# Patient Record
Sex: FEMALE | Race: WHITE | Employment: FULL TIME | URBAN - METROPOLITAN AREA
[De-identification: names, ages, dates, MRNs, and addresses within clinical notes are randomized per-mention and may not be internally consistent; named-entity substitution may affect disease eponyms.]

---

## 2017-02-27 PROBLEM — E66.9 OBESITY: Status: ACTIVE | Noted: 2017-02-27

## 2019-03-22 DIAGNOSIS — I10 ESSENTIAL HYPERTENSION: ICD-10-CM

## 2019-03-22 RX ORDER — NEBIVOLOL 5 MG/1
5 TABLET ORAL DAILY
Qty: 90 TABLET | Refills: 0 | Status: SHIPPED | OUTPATIENT
Start: 2019-03-22 | End: 2019-06-12 | Stop reason: ALTCHOICE

## 2019-05-15 ENCOUNTER — OFFICE VISIT (OUTPATIENT)
Dept: PRIMARY CARE CLINIC | Age: 44
End: 2019-05-15
Payer: COMMERCIAL

## 2019-05-15 ENCOUNTER — HOSPITAL ENCOUNTER (OUTPATIENT)
Age: 44
Setting detail: SPECIMEN
Discharge: HOME | End: 2019-05-15
Payer: COMMERCIAL

## 2019-05-15 VITALS
BODY MASS INDEX: 33.03 KG/M2 | OXYGEN SATURATION: 98 % | HEART RATE: 78 BPM | DIASTOLIC BLOOD PRESSURE: 92 MMHG | WEIGHT: 192.4 LBS | SYSTOLIC BLOOD PRESSURE: 130 MMHG

## 2019-05-15 DIAGNOSIS — R42 DIZZINESS: ICD-10-CM

## 2019-05-15 DIAGNOSIS — R29.898 ARM HEAVINESS: ICD-10-CM

## 2019-05-15 DIAGNOSIS — R07.89 OTHER CHEST PAIN: ICD-10-CM

## 2019-05-15 DIAGNOSIS — R07.89 OTHER CHEST PAIN: Primary | ICD-10-CM

## 2019-05-15 DIAGNOSIS — G51.4 FACIAL TWITCHING: ICD-10-CM

## 2019-05-15 DIAGNOSIS — G44.52 NEW DAILY PERSISTENT HEADACHE: ICD-10-CM

## 2019-05-15 DIAGNOSIS — E78.5 DYSLIPIDEMIA: ICD-10-CM

## 2019-05-15 LAB
ABSOLUTE EOS #: 0.04 K/UL (ref 0–0.44)
ABSOLUTE IMMATURE GRANULOCYTE: <0.03 K/UL (ref 0–0.3)
ABSOLUTE LYMPH #: 2.01 K/UL (ref 1.1–3.7)
ABSOLUTE MONO #: 0.45 K/UL (ref 0.1–1.2)
ANION GAP SERPL CALCULATED.3IONS-SCNC: 14 MMOL/L (ref 9–17)
BASOPHILS # BLD: 0 % (ref 0–2)
BASOPHILS ABSOLUTE: 0.03 K/UL (ref 0–0.2)
BUN BLDV-MCNC: 13 MG/DL (ref 6–20)
BUN/CREAT BLD: NORMAL (ref 9–20)
CALCIUM SERPL-MCNC: 9 MG/DL (ref 8.6–10.4)
CHLORIDE BLD-SCNC: 103 MMOL/L (ref 98–107)
CHOLESTEROL/HDL RATIO: 4.9
CHOLESTEROL: 225 MG/DL
CO2: 23 MMOL/L (ref 20–31)
CREAT SERPL-MCNC: 0.64 MG/DL (ref 0.5–0.9)
DIFFERENTIAL TYPE: ABNORMAL
EOSINOPHILS RELATIVE PERCENT: 1 % (ref 1–4)
GFR AFRICAN AMERICAN: >60 ML/MIN
GFR NON-AFRICAN AMERICAN: >60 ML/MIN
GFR SERPL CREATININE-BSD FRML MDRD: NORMAL ML/MIN/{1.73_M2}
GFR SERPL CREATININE-BSD FRML MDRD: NORMAL ML/MIN/{1.73_M2}
GLUCOSE FASTING: 91 MG/DL (ref 70–99)
HCT VFR BLD CALC: 46.2 % (ref 36.3–47.1)
HDLC SERPL-MCNC: 46 MG/DL
HEMOGLOBIN: 14.7 G/DL (ref 11.9–15.1)
IMMATURE GRANULOCYTES: 0 %
LDL CHOLESTEROL: 154 MG/DL (ref 0–130)
LYMPHOCYTES # BLD: 27 % (ref 24–43)
MCH RBC QN AUTO: 28.1 PG (ref 25.2–33.5)
MCHC RBC AUTO-ENTMCNC: 31.8 G/DL (ref 28.4–34.8)
MCV RBC AUTO: 88.2 FL (ref 82.6–102.9)
MONOCYTES # BLD: 6 % (ref 3–12)
NRBC AUTOMATED: 0 PER 100 WBC
PDW BLD-RTO: 12.8 % (ref 11.8–14.4)
PLATELET # BLD: 250 K/UL (ref 138–453)
PLATELET ESTIMATE: ABNORMAL
PMV BLD AUTO: 10.4 FL (ref 8.1–13.5)
POTASSIUM SERPL-SCNC: 5.1 MMOL/L (ref 3.7–5.3)
RBC # BLD: 5.24 M/UL (ref 3.95–5.11)
RBC # BLD: ABNORMAL 10*6/UL
SEG NEUTROPHILS: 66 % (ref 36–65)
SEGMENTED NEUTROPHILS ABSOLUTE COUNT: 4.95 K/UL (ref 1.5–8.1)
SODIUM BLD-SCNC: 140 MMOL/L (ref 135–144)
TRIGL SERPL-MCNC: 125 MG/DL
VLDLC SERPL CALC-MCNC: ABNORMAL MG/DL (ref 1–30)
WBC # BLD: 7.5 K/UL (ref 3.5–11.3)
WBC # BLD: ABNORMAL 10*3/UL

## 2019-05-15 PROCEDURE — 93000 ELECTROCARDIOGRAM COMPLETE: CPT | Performed by: PHYSICIAN ASSISTANT

## 2019-05-15 PROCEDURE — 99214 OFFICE O/P EST MOD 30 MIN: CPT | Performed by: PHYSICIAN ASSISTANT

## 2019-05-15 PROCEDURE — 96372 THER/PROPH/DIAG INJ SC/IM: CPT | Performed by: PHYSICIAN ASSISTANT

## 2019-05-15 RX ORDER — KETOROLAC TROMETHAMINE 30 MG/ML
60 INJECTION, SOLUTION INTRAMUSCULAR; INTRAVENOUS ONCE
Status: COMPLETED | OUTPATIENT
Start: 2019-05-15 | End: 2019-05-15

## 2019-05-15 RX ADMIN — KETOROLAC TROMETHAMINE 60 MG: 30 INJECTION, SOLUTION INTRAMUSCULAR; INTRAVENOUS at 11:06

## 2019-05-15 ASSESSMENT — ENCOUNTER SYMPTOMS
NAUSEA: 1
PHOTOPHOBIA: 0
VOMITING: 0
SHORTNESS OF BREATH: 0

## 2019-05-15 NOTE — PROGRESS NOTES
[facility] Primary Care  [address] [facility]  Phone: [phone]  Fax: [phone]    Olivia Urena is a 40 y.o. female who presents today for her medical conditions/complaintsas noted below. Chief Complaint   Patient presents with    Headache     x 2 weeks. constant, still there. pain fluctuates. otc tylenol, ibuprofen, aleve, excedrin, elena. nothing really works. elena eases the pain a little. pain goes from 7-8 to a 2-3. pain is mainly in temples.  Chest Pain     happened on Friday, 1 time. Was having dinner when it happened. Lasted 1 minute. Left side of chest. Did not feel like heartburn. Foot went numb the day before, left foot. Left arm felt extremely heavy on Sunday.  Hypertension     Has been running high. 160s/90s.  Spasms     face was twitching a few weeks ago. 50+ times a day under right eye. not happens in her eyelid. no pain, no vision change. HPI:     HPI  End of March started with face twitches, no pain. Then started 50 times a day with twitches. Now in right lower eye lid only. Has constant HA for 2 weeks now that nothing relieves. Elena ASA helps a little    Stressed with brother in ETOH detox--home now. Son found vaping one time at school and she is really upset about this. All above happened before this stress      Left foot become numb and red when walking once. After a game at dinner she had a pain in left chest lasting one minute with some SOB. That weekend (this past Friday) her left arm became heavy for 2 hours. BPs at home have been 130-50s/80-90s. Current Outpatient Medications   Medication Sig Dispense Refill    nebivolol (BYSTOLIC) 5 MG tablet Take 1 tablet by mouth daily 90 tablet 0    Multiple Vitamin (MULTI-DAY) TABS Take 1 tablet by mouth daily       No current facility-administered medications for this visit.       No Known Allergies    Subjective:      Review of Systems   Constitutional: Negative Specific Question:   Is Patient Fasting?/# of Hours     Answer:   10-12 hours    EKG 12 Lead     Order Specific Question:   Reason for Exam?     Answer:   Chest pain     Orders Placed This Encounter   Medications    ketorolac (TORADOL) injection 60 mg           Electronically signed by Padmini Lopez 5/15/2019 at 12:46 PM

## 2019-06-12 ENCOUNTER — OFFICE VISIT (OUTPATIENT)
Dept: PRIMARY CARE CLINIC | Age: 44
End: 2019-06-12
Payer: COMMERCIAL

## 2019-06-12 VITALS
DIASTOLIC BLOOD PRESSURE: 98 MMHG | OXYGEN SATURATION: 98 % | WEIGHT: 196.8 LBS | SYSTOLIC BLOOD PRESSURE: 144 MMHG | HEART RATE: 71 BPM | BODY MASS INDEX: 33.78 KG/M2

## 2019-06-12 DIAGNOSIS — G44.89 OTHER HEADACHE SYNDROME: ICD-10-CM

## 2019-06-12 DIAGNOSIS — I10 ESSENTIAL HYPERTENSION: Primary | ICD-10-CM

## 2019-06-12 PROCEDURE — 99214 OFFICE O/P EST MOD 30 MIN: CPT | Performed by: PHYSICIAN ASSISTANT

## 2019-06-12 RX ORDER — LISINOPRIL 10 MG/1
10 TABLET ORAL DAILY
Qty: 30 TABLET | Refills: 5 | Status: SHIPPED | OUTPATIENT
Start: 2019-06-12 | End: 2019-12-17 | Stop reason: SDUPTHER

## 2019-06-12 RX ORDER — CLINDAMYCIN HYDROCHLORIDE 300 MG/1
CAPSULE ORAL
Refills: 0 | COMMUNITY
Start: 2019-05-31 | End: 2019-07-09 | Stop reason: ALTCHOICE

## 2019-06-12 ASSESSMENT — ENCOUNTER SYMPTOMS: RESPIRATORY NEGATIVE: 1

## 2019-06-12 NOTE — PROGRESS NOTES
[address] [place] [address] PRIMARY CARE  [address] [place] [address]  Dept: [phone]    Kirstin Arevalo is a 40 y.o. female who presents today for her medical conditions/complaintsas noted below. Chief Complaint   Patient presents with    Results     MRI and BW from 5/15/19.  Forms     health screening form for work. HPI:     HPI  HAs are better. Did have root canal emergently with bad infection. No longer having facial twitching. On ibuprofen for tooth pain round the clock. Did take Amoxil and was switched to Clindamycin. Will see dentist  . Bite has been adjusted several times. Fioricet did not help. Also has a work wellness form.        LDL Cholesterol (mg/dL)   Date Value   05/15/2019 154 (H)     LDL Calculated (mg/dL)   Date Value   2018 123   2017 180 (A)       (goal LDL is <100)   BUN (mg/dL)   Date Value   05/15/2019 13     BP Readings from Last 3 Encounters:   19 (!) 144/98   05/15/19 (!) 130/92   18 124/76          (goal 120/80)    Past Medical History:   Diagnosis Date    Herpes labialis     Uterine leiomyoma       Past Surgical History:   Procedure Laterality Date     SECTION      HYSTERECTOMY  2018    With Removal of Submucous Leiomyomata    LAPAROSCOPY      Excis Endometriotic Tissue Uterosacral Ligaments       Family History   Problem Relation Age of Onset    High Blood Pressure Mother     Depression Mother     Other Mother         Cluster Headaches; Fibromyalgia    Cancer Maternal Grandmother         breast    Heart Disease Maternal Grandfather     Cancer Paternal Grandmother         breast    Stroke Paternal Grandmother     Arthritis Paternal Grandmother     Cancer Paternal Grandfather         lung    Other Father         Alcoholism    Other Sister         Ovarian Neoplasm       Social History     Tobacco Use    Smoking status: Never Smoker    Smokeless tobacco: Never Used Substance Use Topics    Alcohol use: Yes     Alcohol/week: 0.0 oz      Current Outpatient Medications   Medication Sig Dispense Refill    lisinopril (PRINIVIL;ZESTRIL) 10 MG tablet Take 1 tablet by mouth daily 30 tablet 5    clindamycin (CLEOCIN) 300 MG capsule TAKE 1 CAPSULE BY MOUTH EVERY EIGHT HOURS UNTIL GONE  0    Multiple Vitamin (MULTI-DAY) TABS Take 1 tablet by mouth daily       No current facility-administered medications for this visit. No Known Allergies    Health Maintenance   Topic Date Due    HIV screen  01/31/1990    Cervical cancer screen  01/31/1996    Potassium monitoring  05/15/2020    Creatinine monitoring  05/15/2020    Lipid screen  05/15/2024    DTaP/Tdap/Td vaccine (2 - Td) 11/19/2028    Flu vaccine  Completed    Pneumococcal 0-64 years Vaccine  Aged Out       Subjective:      Review of Systems   Constitutional: Positive for fatigue. Negative for diaphoresis. HENT: Positive for dental problem. Respiratory: Negative. Cardiovascular: Negative. Musculoskeletal: Negative for neck pain and neck stiffness. Neurological: Positive for headaches (tightness in temples). Negative for dizziness, syncope, light-headedness and numbness. Objective:     BP (!) 144/98   Pulse 71   Wt 196 lb 12.8 oz (89.3 kg)   SpO2 98%   BMI 33.78 kg/m²   Physical Exam   Constitutional: She appears well-developed and well-nourished. No distress. HENT:   Mouth/Throat: Oropharynx is clear and moist and mucous membranes are normal. Abnormal dentition (molar on right). No dental abscesses. Tonsils are 0 on the right. Tonsils are 0 on the left. Eyes: Pupils are equal, round, and reactive to light. Conjunctivae are normal.   Cardiovascular: Normal rate, regular rhythm and normal heart sounds.    Pulmonary/Chest: Effort normal and breath sounds normal.   Lymphadenopathy:        Head (right side): No submental, no submandibular, no tonsillar, no preauricular, no posterior auricular and no

## 2019-06-13 DIAGNOSIS — R29.898 ARM HEAVINESS: ICD-10-CM

## 2019-06-13 DIAGNOSIS — G44.52 NEW DAILY PERSISTENT HEADACHE: ICD-10-CM

## 2019-06-13 DIAGNOSIS — R42 DIZZINESS: ICD-10-CM

## 2019-06-13 DIAGNOSIS — G51.4 FACIAL TWITCHING: ICD-10-CM

## 2019-07-09 ENCOUNTER — OFFICE VISIT (OUTPATIENT)
Dept: PRIMARY CARE CLINIC | Age: 44
End: 2019-07-09
Payer: COMMERCIAL

## 2019-07-09 VITALS
DIASTOLIC BLOOD PRESSURE: 88 MMHG | BODY MASS INDEX: 33.47 KG/M2 | HEART RATE: 85 BPM | WEIGHT: 195 LBS | SYSTOLIC BLOOD PRESSURE: 118 MMHG | OXYGEN SATURATION: 98 %

## 2019-07-09 DIAGNOSIS — G44.209 TENSION HEADACHE: ICD-10-CM

## 2019-07-09 DIAGNOSIS — G43.009 MIGRAINE WITHOUT AURA AND WITHOUT STATUS MIGRAINOSUS, NOT INTRACTABLE: Primary | ICD-10-CM

## 2019-07-09 DIAGNOSIS — G44.52 NEW DAILY PERSISTENT HEADACHE: ICD-10-CM

## 2019-07-09 DIAGNOSIS — I10 ESSENTIAL HYPERTENSION: ICD-10-CM

## 2019-07-09 PROCEDURE — 99213 OFFICE O/P EST LOW 20 MIN: CPT | Performed by: PHYSICIAN ASSISTANT

## 2019-07-09 RX ORDER — FROVATRIPTAN SUCCINATE 2.5 MG/1
2.5 TABLET, FILM COATED ORAL PRN
Qty: 10 TABLET | Refills: 2 | Status: SHIPPED | OUTPATIENT
Start: 2019-07-09 | End: 2019-07-09 | Stop reason: ALTCHOICE

## 2019-07-09 RX ORDER — TOPIRAMATE 25 MG/1
TABLET ORAL
Qty: 148 TABLET | Refills: 0 | Status: SHIPPED | OUTPATIENT
Start: 2019-07-09 | End: 2020-12-14

## 2019-07-09 RX ORDER — NARATRIPTAN 2.5 MG/1
2.5 TABLET ORAL
Qty: 9 TABLET | Refills: 0 | Status: SHIPPED | OUTPATIENT
Start: 2019-07-09 | End: 2020-12-14 | Stop reason: ALTCHOICE

## 2019-07-09 ASSESSMENT — ENCOUNTER SYMPTOMS
SINUS PRESSURE: 0
RESPIRATORY NEGATIVE: 1
PHOTOPHOBIA: 0
EYE PAIN: 0
RHINORRHEA: 0

## 2019-07-09 NOTE — PROGRESS NOTES
Paternal Grandmother     Arthritis Paternal Grandmother     Cancer Paternal Grandfather         lung    Other Father         Alcoholism    Other Sister         Ovarian Neoplasm       Social History     Tobacco Use    Smoking status: Never Smoker    Smokeless tobacco: Never Used   Substance Use Topics    Alcohol use: Yes     Alcohol/week: 0.0 oz      Current Outpatient Medications   Medication Sig Dispense Refill    topiramate (TOPAMAX) 25 MG tablet Take 1 tablet qd x4 days, then 2 tablets bid x 4days, then 2 tablets qam and 1 tablet qhs x4days, then 2 tablets bid 148 tablet 0    frovatriptan succinate (FROVA) 2.5 MG TABS Take 1 tablet by mouth as needed for Migraine Take one tablet at onset of HA and one 2 hours later if HA is not resolved. 10 tablet 2    lisinopril (PRINIVIL;ZESTRIL) 10 MG tablet Take 1 tablet by mouth daily 30 tablet 5    Multiple Vitamin (MULTI-DAY) TABS Take 1 tablet by mouth daily       No current facility-administered medications for this visit. No Known Allergies    Health Maintenance   Topic Date Due    HIV screen  01/31/1990    Cervical cancer screen  01/31/1996    Flu vaccine (1) 09/01/2019    Potassium monitoring  05/15/2020    Creatinine monitoring  05/15/2020    Lipid screen  05/15/2024    DTaP/Tdap/Td vaccine (2 - Td) 11/19/2028    Pneumococcal 0-64 years Vaccine  Aged Out       Subjective:      Review of Systems   Constitutional: Negative for appetite change, chills, diaphoresis, fever and unexpected weight change. HENT: Negative for congestion, dental problem, ear pain, nosebleeds, postnasal drip, rhinorrhea, sinus pressure and tinnitus. Eyes: Negative for photophobia, pain and visual disturbance. Respiratory: Negative. Cardiovascular: Negative. Endocrine: Negative for polydipsia, polyphagia and polyuria. Musculoskeletal: Negative for neck pain and neck stiffness. Skin: Negative for rash.    Allergic/Immunologic: Negative for environmental

## 2019-08-09 ENCOUNTER — OFFICE VISIT (OUTPATIENT)
Dept: PRIMARY CARE CLINIC | Age: 44
End: 2019-08-09
Payer: COMMERCIAL

## 2019-08-09 VITALS
HEART RATE: 105 BPM | DIASTOLIC BLOOD PRESSURE: 88 MMHG | OXYGEN SATURATION: 98 % | SYSTOLIC BLOOD PRESSURE: 130 MMHG | WEIGHT: 191 LBS | BODY MASS INDEX: 32.79 KG/M2

## 2019-08-09 DIAGNOSIS — M26.622 ARTHRALGIA OF LEFT TEMPOROMANDIBULAR JOINT: ICD-10-CM

## 2019-08-09 DIAGNOSIS — M79.2 NEURALGIA: ICD-10-CM

## 2019-08-09 PROCEDURE — 99214 OFFICE O/P EST MOD 30 MIN: CPT | Performed by: PHYSICIAN ASSISTANT

## 2019-08-09 RX ORDER — IBUPROFEN 600 MG/1
1 TABLET ORAL
COMMUNITY
Start: 2019-07-28 | End: 2021-07-19 | Stop reason: ALTCHOICE

## 2019-08-09 RX ORDER — NORTRIPTYLINE HYDROCHLORIDE 25 MG/1
25 CAPSULE ORAL NIGHTLY
Qty: 30 CAPSULE | Refills: 0 | Status: SHIPPED | OUTPATIENT
Start: 2019-08-09 | End: 2019-08-16 | Stop reason: SDUPTHER

## 2019-08-09 RX ORDER — PREDNISONE 20 MG/1
20 TABLET ORAL 2 TIMES DAILY
Qty: 10 TABLET | Refills: 0 | Status: SHIPPED | OUTPATIENT
Start: 2019-08-09 | End: 2019-08-14

## 2019-08-09 ASSESSMENT — ENCOUNTER SYMPTOMS
ABDOMINAL PAIN: 0
SORE THROAT: 0
PHOTOPHOBIA: 0
CHEST TIGHTNESS: 0
COLOR CHANGE: 1
BACK PAIN: 0
SHORTNESS OF BREATH: 0

## 2019-08-09 NOTE — PROGRESS NOTES
Stroke Paternal Grandmother     Arthritis Paternal Grandmother     Cancer Paternal Grandfather         lung    Other Father         Alcoholism    Other Sister         Ovarian Neoplasm       Social History     Tobacco Use    Smoking status: Never Smoker    Smokeless tobacco: Never Used   Substance Use Topics    Alcohol use: Yes     Alcohol/week: 0.0 standard drinks      Current Outpatient Medications   Medication Sig Dispense Refill    ibuprofen (ADVIL;MOTRIN) 600 MG tablet Take 1 tablet by mouth every 6-8 hours as needed      predniSONE (DELTASONE) 20 MG tablet Take 1 tablet by mouth 2 times daily for 5 days 10 tablet 0    nortriptyline (PAMELOR) 25 MG capsule Take 1 capsule by mouth nightly 30 capsule 0    topiramate (TOPAMAX) 25 MG tablet Take 1 tablet qd x4 days, then 2 tablets bid x 4days, then 2 tablets qam and 1 tablet qhs x4days, then 2 tablets bid (Patient taking differently: Take 50 mg by mouth daily ) 148 tablet 0    lisinopril (PRINIVIL;ZESTRIL) 10 MG tablet Take 1 tablet by mouth daily 30 tablet 5    Multiple Vitamin (MULTI-DAY) TABS Take 1 tablet by mouth daily      naratriptan (AMERGE) 2.5 MG tablet Take 1 tablet by mouth once as needed for Migraine 2.5 mg at onset of headache, may repeat in 4 hours if needed 9 tablet 0     No current facility-administered medications for this visit. No Known Allergies    Health Maintenance   Topic Date Due    HIV screen  01/31/1990    Cervical cancer screen  01/31/1996    Flu vaccine (1) 09/01/2019    Potassium monitoring  05/15/2020    Creatinine monitoring  05/15/2020    Lipid screen  05/15/2024    DTaP/Tdap/Td vaccine (2 - Td) 11/19/2028    Pneumococcal 0-64 years Vaccine  Aged Out       Subjective:      Review of Systems   Constitutional: Negative for appetite change, chills and fever.  Diaphoresis: loss of appetite    HENT: Positive for congestion (left side feels stuffy face feels puffy ) and ear pain (Bilateral ear pain, worse in the left ear). Negative for ear discharge, hearing loss and sore throat. Tinnitus: can hear swooshing inleft ear intermittently. Eyes: Negative for photophobia and visual disturbance. Respiratory: Negative for chest tightness and shortness of breath. Cardiovascular: Negative for chest pain, palpitations and leg swelling. Gastrointestinal: Negative for abdominal pain. Endocrine: Negative for cold intolerance and heat intolerance. Genitourinary: Negative for difficulty urinating. Musculoskeletal: Negative for back pain, neck pain and neck stiffness. Skin: Positive for color change. Neurological: Positive for dizziness (over the weekend ) and headaches. Negative for syncope, light-headedness and numbness. Objective:     /88   Pulse 105   Wt 191 lb (86.6 kg)   SpO2 98%   BMI 32.79 kg/m²   Physical Exam   Constitutional: She is oriented to person, place, and time. She appears well-developed and well-nourished. HENT:   Head: Normocephalic. Right Ear: External ear normal.   Left Ear: External ear normal.   Nose: Nose normal.   Mouth/Throat: Oropharynx is clear and moist.   Neck: Normal range of motion. Neck supple. Cardiovascular: Normal rate and regular rhythm. Pulmonary/Chest: Effort normal and breath sounds normal.   Abdominal: Soft. Bowel sounds are normal.   Musculoskeletal: Normal range of motion. Neurological: She is alert and oriented to person, place, and time. Skin: Skin is warm and dry. Psychiatric:   Patient is very tearful. Assessment:       Diagnosis Orders   1. Neuralgia     2. Arthralgia of left temporomandibular joint          Plan:    Prednisone for TMJ inflammation  Heat and ice  Start Pamelor for neuralgia  F/U with dentist   See neurologist once dental issues are better  See me next Friday      Return in about 1 week (around 8/16/2019). No orders of the defined types were placed in this encounter.     Orders Placed This Encounter   Medications   

## 2019-08-16 ENCOUNTER — OFFICE VISIT (OUTPATIENT)
Dept: PRIMARY CARE CLINIC | Age: 44
End: 2019-08-16
Payer: COMMERCIAL

## 2019-08-16 VITALS
WEIGHT: 192 LBS | SYSTOLIC BLOOD PRESSURE: 126 MMHG | BODY MASS INDEX: 32.96 KG/M2 | HEART RATE: 88 BPM | DIASTOLIC BLOOD PRESSURE: 84 MMHG | OXYGEN SATURATION: 98 %

## 2019-08-16 DIAGNOSIS — M26.622 ARTHRALGIA OF LEFT TEMPOROMANDIBULAR JOINT: ICD-10-CM

## 2019-08-16 DIAGNOSIS — H69.83 EUSTACHIAN TUBE DYSFUNCTION, BILATERAL: ICD-10-CM

## 2019-08-16 DIAGNOSIS — M79.2 NEURALGIA: Primary | ICD-10-CM

## 2019-08-16 PROCEDURE — 99213 OFFICE O/P EST LOW 20 MIN: CPT | Performed by: PHYSICIAN ASSISTANT

## 2019-08-16 RX ORDER — MOMETASONE FUROATE 50 UG/1
2 SPRAY, METERED NASAL DAILY
Qty: 1 INHALER | Refills: 3 | Status: SHIPPED | OUTPATIENT
Start: 2019-08-16 | End: 2020-12-14 | Stop reason: ALTCHOICE

## 2019-08-16 RX ORDER — NORTRIPTYLINE HYDROCHLORIDE 25 MG/1
25 CAPSULE ORAL NIGHTLY
Qty: 30 CAPSULE | Refills: 1 | Status: SHIPPED | OUTPATIENT
Start: 2019-08-16 | End: 2019-10-01 | Stop reason: ALTCHOICE

## 2019-08-16 RX ORDER — NORTRIPTYLINE HYDROCHLORIDE 50 MG/1
50 CAPSULE ORAL NIGHTLY
Qty: 30 CAPSULE | Refills: 2 | Status: CANCELLED | OUTPATIENT
Start: 2019-08-16

## 2019-08-16 ASSESSMENT — ENCOUNTER SYMPTOMS: RESPIRATORY NEGATIVE: 1

## 2019-08-16 NOTE — PROGRESS NOTES
maxillary sinus tenderness and no frontal sinus tenderness. Left sinus exhibits no maxillary sinus tenderness and no frontal sinus tenderness. Mouth/Throat: Uvula is midline, oropharynx is clear and moist and mucous membranes are normal.   Lymphadenopathy:     She has no cervical adenopathy. Skin: No rash noted. Assessment:       Diagnosis Orders   1. Neuralgia     2. Arthralgia of left temporomandibular joint     3. Eustachian tube dysfunction, bilateral          Plan:    Continue the Pamelor  Continue seeing dentist  Start Nasonex       Return if symptoms worsen or fail to improve. No orders of the defined types were placed in this encounter. Orders Placed This Encounter   Medications    mometasone (NASONEX) 50 MCG/ACT nasal spray     Si sprays by Nasal route daily     Dispense:  1 Inhaler     Refill:  3    nortriptyline (PAMELOR) 25 MG capsule     Sig: Take 1 capsule by mouth nightly     Dispense:  30 capsule     Refill:  1       Patient given educationalmaterials - see patient instructions. Discussed use, benefit, and side effectsof prescribed medications. All patient questions answered. Pt voiced understanding. Reviewed health maintenance. Instructed to continue current medications, diet andexercise. Patient agreed with treatment plan. Follow up as directed.      Electronicallysigned by Don Osorio PA-C on 2019 at 8:59 AM

## 2019-09-24 ENCOUNTER — TELEPHONE (OUTPATIENT)
Dept: PRIMARY CARE CLINIC | Age: 44
End: 2019-09-24

## 2019-09-25 NOTE — TELEPHONE ENCOUNTER
September 25, 2019   Tang    to Anitha Wang PA-C            3:50 PM   Jayda Shen,   What a crazy mess this whole issue has been with this tooth and my face. Long story short, I had them pull it 2 weeks ago, despite what the xrays showed because I just do not feel good. After she pulled it, we found that the tooth was in fact abcessed and it perforated a hole in my sinus. The dentist was in disbelief! I also had a terrible sinus infection last week and was told I could not blow my nose due to the hole in my sinus. I have an appt. with you on Tues. but if there are any cancellations, please have them call me.  I was only on an Amoxiclav for 10 days and don't feel like it was long enough or strong enough to not only get rid of the abcess but the sinus infection as my upper teeth hurt and I have facial pain in my sinus

## 2019-10-01 ENCOUNTER — HOSPITAL ENCOUNTER (OUTPATIENT)
Age: 44
Setting detail: SPECIMEN
Discharge: HOME | End: 2019-10-01
Payer: COMMERCIAL

## 2019-10-01 ENCOUNTER — OFFICE VISIT (OUTPATIENT)
Dept: PRIMARY CARE CLINIC | Age: 44
End: 2019-10-01
Payer: COMMERCIAL

## 2019-10-01 VITALS
BODY MASS INDEX: 32.2 KG/M2 | OXYGEN SATURATION: 98 % | DIASTOLIC BLOOD PRESSURE: 80 MMHG | HEART RATE: 84 BPM | SYSTOLIC BLOOD PRESSURE: 126 MMHG | WEIGHT: 187.6 LBS

## 2019-10-01 DIAGNOSIS — R22.0 FACIAL SWELLING: ICD-10-CM

## 2019-10-01 DIAGNOSIS — Z23 NEED FOR VACCINATION: ICD-10-CM

## 2019-10-01 DIAGNOSIS — R51.9 CHRONIC FACIAL PAIN: Primary | ICD-10-CM

## 2019-10-01 DIAGNOSIS — J34.89 SINUS PAIN: ICD-10-CM

## 2019-10-01 DIAGNOSIS — G89.29 CHRONIC FACIAL PAIN: Primary | ICD-10-CM

## 2019-10-01 LAB
ABSOLUTE EOS #: <0.03 K/UL (ref 0–0.44)
ABSOLUTE IMMATURE GRANULOCYTE: <0.03 K/UL (ref 0–0.3)
ABSOLUTE LYMPH #: 2.01 K/UL (ref 1.1–3.7)
ABSOLUTE MONO #: 0.39 K/UL (ref 0.1–1.2)
BASOPHILS # BLD: 1 % (ref 0–2)
BASOPHILS ABSOLUTE: 0.03 K/UL (ref 0–0.2)
DIFFERENTIAL TYPE: ABNORMAL
EOSINOPHILS RELATIVE PERCENT: 0 % (ref 1–4)
HCT VFR BLD CALC: 41.8 % (ref 36.3–47.1)
HEMOGLOBIN: 13.4 G/DL (ref 11.9–15.1)
IMMATURE GRANULOCYTES: 0 %
LYMPHOCYTES # BLD: 31 % (ref 24–43)
MCH RBC QN AUTO: 27.5 PG (ref 25.2–33.5)
MCHC RBC AUTO-ENTMCNC: 32.1 G/DL (ref 28.4–34.8)
MCV RBC AUTO: 85.8 FL (ref 82.6–102.9)
MONOCYTES # BLD: 6 % (ref 3–12)
NRBC AUTOMATED: 0 PER 100 WBC
PDW BLD-RTO: 13.1 % (ref 11.8–14.4)
PLATELET # BLD: 260 K/UL (ref 138–453)
PLATELET ESTIMATE: ABNORMAL
PMV BLD AUTO: 10.6 FL (ref 8.1–13.5)
RBC # BLD: 4.87 M/UL (ref 3.95–5.11)
RBC # BLD: ABNORMAL 10*6/UL
SEG NEUTROPHILS: 62 % (ref 36–65)
SEGMENTED NEUTROPHILS ABSOLUTE COUNT: 3.94 K/UL (ref 1.5–8.1)
WBC # BLD: 6.4 K/UL (ref 3.5–11.3)
WBC # BLD: ABNORMAL 10*3/UL

## 2019-10-01 PROCEDURE — 90686 IIV4 VACC NO PRSV 0.5 ML IM: CPT | Performed by: PHYSICIAN ASSISTANT

## 2019-10-01 PROCEDURE — 90471 IMMUNIZATION ADMIN: CPT | Performed by: PHYSICIAN ASSISTANT

## 2019-10-01 PROCEDURE — 99213 OFFICE O/P EST LOW 20 MIN: CPT | Performed by: PHYSICIAN ASSISTANT

## 2019-10-01 RX ORDER — METHYLPREDNISOLONE 4 MG/1
TABLET ORAL
Qty: 1 KIT | Refills: 0 | Status: SHIPPED | OUTPATIENT
Start: 2019-10-01 | End: 2019-10-07

## 2019-10-01 ASSESSMENT — ENCOUNTER SYMPTOMS
FACIAL SWELLING: 1
SINUS PAIN: 1
RESPIRATORY NEGATIVE: 1
SINUS PRESSURE: 1

## 2019-10-09 ENCOUNTER — TELEPHONE (OUTPATIENT)
Dept: PRIMARY CARE CLINIC | Age: 44
End: 2019-10-09

## 2019-10-14 DIAGNOSIS — J34.89 SINUS PAIN: ICD-10-CM

## 2019-10-14 DIAGNOSIS — R22.0 FACIAL SWELLING: Primary | ICD-10-CM

## 2020-05-28 RX ORDER — HYDROCHLOROTHIAZIDE 25 MG/1
25 TABLET ORAL EVERY MORNING
Qty: 30 TABLET | Refills: 1 | Status: SHIPPED | OUTPATIENT
Start: 2020-05-28 | End: 2020-06-25 | Stop reason: SDUPTHER

## 2020-06-18 ENCOUNTER — HOSPITAL ENCOUNTER (OUTPATIENT)
Age: 45
Setting detail: OBSERVATION
Discharge: HOME | End: 2020-06-19
Attending: EMERGENCY MEDICINE | Admitting: FAMILY MEDICINE
Payer: COMMERCIAL

## 2020-06-18 ENCOUNTER — APPOINTMENT (OUTPATIENT)
Dept: GENERAL RADIOLOGY | Age: 45
End: 2020-06-18
Payer: COMMERCIAL

## 2020-06-18 PROBLEM — R07.9 CHEST PAIN: Status: ACTIVE | Noted: 2020-06-18

## 2020-06-18 LAB
ABSOLUTE EOS #: 0.1 K/UL (ref 0–0.4)
ABSOLUTE IMMATURE GRANULOCYTE: ABNORMAL K/UL (ref 0–0.3)
ABSOLUTE LYMPH #: 1.7 K/UL (ref 1–4.8)
ABSOLUTE MONO #: 0.4 K/UL (ref 0.1–1.3)
ANION GAP SERPL CALCULATED.3IONS-SCNC: 15 MMOL/L (ref 9–17)
BASOPHILS # BLD: 1 % (ref 0–2)
BASOPHILS ABSOLUTE: 0 K/UL (ref 0–0.2)
BNP INTERPRETATION: NORMAL
BUN BLDV-MCNC: 14 MG/DL (ref 6–20)
BUN/CREAT BLD: ABNORMAL (ref 9–20)
CALCIUM SERPL-MCNC: 9.5 MG/DL (ref 8.6–10.4)
CHLORIDE BLD-SCNC: 99 MMOL/L (ref 98–107)
CO2: 24 MMOL/L (ref 20–31)
CREAT SERPL-MCNC: 0.76 MG/DL (ref 0.5–0.9)
D-DIMER QUANTITATIVE: 0.48 MG/L FEU (ref 0–0.59)
DIFFERENTIAL TYPE: ABNORMAL
EOSINOPHILS RELATIVE PERCENT: 1 % (ref 0–4)
ESTIMATED AVERAGE GLUCOSE: 114 MG/DL
GFR AFRICAN AMERICAN: >60 ML/MIN
GFR NON-AFRICAN AMERICAN: >60 ML/MIN
GFR SERPL CREATININE-BSD FRML MDRD: ABNORMAL ML/MIN/{1.73_M2}
GFR SERPL CREATININE-BSD FRML MDRD: ABNORMAL ML/MIN/{1.73_M2}
GLUCOSE BLD-MCNC: 147 MG/DL (ref 70–99)
HBA1C MFR BLD: 5.6 % (ref 4–6)
HCG QUALITATIVE: NEGATIVE
HCT VFR BLD CALC: 42.1 % (ref 36–46)
HEMOGLOBIN: 14.5 G/DL (ref 12–16)
IMMATURE GRANULOCYTES: ABNORMAL %
LYMPHOCYTES # BLD: 24 % (ref 24–44)
MCH RBC QN AUTO: 29.5 PG (ref 26–34)
MCHC RBC AUTO-ENTMCNC: 34.4 G/DL (ref 31–37)
MCV RBC AUTO: 85.9 FL (ref 80–100)
MONOCYTES # BLD: 6 % (ref 1–7)
MYOGLOBIN: <21 NG/ML (ref 25–58)
MYOGLOBIN: <21 NG/ML (ref 25–58)
NRBC AUTOMATED: ABNORMAL PER 100 WBC
PDW BLD-RTO: 13.1 % (ref 11.5–14.9)
PLATELET # BLD: 237 K/UL (ref 150–450)
PLATELET ESTIMATE: ABNORMAL
PMV BLD AUTO: 7.6 FL (ref 6–12)
POTASSIUM SERPL-SCNC: 3.6 MMOL/L (ref 3.7–5.3)
PRO-BNP: 25 PG/ML
RBC # BLD: 4.9 M/UL (ref 4–5.2)
RBC # BLD: ABNORMAL 10*6/UL
SEG NEUTROPHILS: 68 % (ref 36–66)
SEGMENTED NEUTROPHILS ABSOLUTE COUNT: 4.8 K/UL (ref 1.3–9.1)
SODIUM BLD-SCNC: 138 MMOL/L (ref 135–144)
THYROXINE, FREE: 1.24 NG/DL (ref 0.93–1.7)
TROPONIN INTERP: ABNORMAL
TROPONIN INTERP: ABNORMAL
TROPONIN T: ABNORMAL NG/ML
TROPONIN T: ABNORMAL NG/ML
TROPONIN, HIGH SENSITIVITY: <6 NG/L (ref 0–14)
TROPONIN, HIGH SENSITIVITY: <6 NG/L (ref 0–14)
TSH SERPL DL<=0.05 MIU/L-ACNC: 3.14 MIU/L (ref 0.3–5)
WBC # BLD: 7 K/UL (ref 3.5–11)
WBC # BLD: ABNORMAL 10*3/UL

## 2020-06-18 PROCEDURE — 36415 COLL VENOUS BLD VENIPUNCTURE: CPT

## 2020-06-18 PROCEDURE — 83880 ASSAY OF NATRIURETIC PEPTIDE: CPT

## 2020-06-18 PROCEDURE — 84703 CHORIONIC GONADOTROPIN ASSAY: CPT

## 2020-06-18 PROCEDURE — 2580000003 HC RX 258: Performed by: FAMILY MEDICINE

## 2020-06-18 PROCEDURE — 84443 ASSAY THYROID STIM HORMONE: CPT

## 2020-06-18 PROCEDURE — G0378 HOSPITAL OBSERVATION PER HR: HCPCS

## 2020-06-18 PROCEDURE — 83036 HEMOGLOBIN GLYCOSYLATED A1C: CPT

## 2020-06-18 PROCEDURE — 84484 ASSAY OF TROPONIN QUANT: CPT

## 2020-06-18 PROCEDURE — 99285 EMERGENCY DEPT VISIT HI MDM: CPT

## 2020-06-18 PROCEDURE — 80048 BASIC METABOLIC PNL TOTAL CA: CPT

## 2020-06-18 PROCEDURE — 71045 X-RAY EXAM CHEST 1 VIEW: CPT

## 2020-06-18 PROCEDURE — 84439 ASSAY OF FREE THYROXINE: CPT

## 2020-06-18 PROCEDURE — 6370000000 HC RX 637 (ALT 250 FOR IP): Performed by: FAMILY MEDICINE

## 2020-06-18 PROCEDURE — 83874 ASSAY OF MYOGLOBIN: CPT

## 2020-06-18 PROCEDURE — 93005 ELECTROCARDIOGRAM TRACING: CPT | Performed by: EMERGENCY MEDICINE

## 2020-06-18 PROCEDURE — 85025 COMPLETE CBC W/AUTO DIFF WBC: CPT

## 2020-06-18 PROCEDURE — 85379 FIBRIN DEGRADATION QUANT: CPT

## 2020-06-18 PROCEDURE — 6370000000 HC RX 637 (ALT 250 FOR IP): Performed by: EMERGENCY MEDICINE

## 2020-06-18 RX ORDER — LISINOPRIL 10 MG/1
10 TABLET ORAL DAILY
Status: DISCONTINUED | OUTPATIENT
Start: 2020-06-18 | End: 2020-06-19 | Stop reason: HOSPADM

## 2020-06-18 RX ORDER — SODIUM CHLORIDE 0.9 % (FLUSH) 0.9 %
10 SYRINGE (ML) INJECTION EVERY 12 HOURS SCHEDULED
Status: DISCONTINUED | OUTPATIENT
Start: 2020-06-18 | End: 2020-06-19 | Stop reason: HOSPADM

## 2020-06-18 RX ORDER — HYDROCHLOROTHIAZIDE 25 MG/1
25 TABLET ORAL EVERY MORNING
Status: DISCONTINUED | OUTPATIENT
Start: 2020-06-18 | End: 2020-06-19 | Stop reason: HOSPADM

## 2020-06-18 RX ORDER — ACETAMINOPHEN 325 MG/1
650 TABLET ORAL EVERY 4 HOURS PRN
Status: DISCONTINUED | OUTPATIENT
Start: 2020-06-18 | End: 2020-06-19 | Stop reason: HOSPADM

## 2020-06-18 RX ORDER — NITROGLYCERIN 0.4 MG/1
0.4 TABLET SUBLINGUAL EVERY 5 MIN PRN
Status: COMPLETED | OUTPATIENT
Start: 2020-06-18 | End: 2020-06-18

## 2020-06-18 RX ORDER — SODIUM CHLORIDE 0.9 % (FLUSH) 0.9 %
10 SYRINGE (ML) INJECTION PRN
Status: DISCONTINUED | OUTPATIENT
Start: 2020-06-18 | End: 2020-06-19 | Stop reason: HOSPADM

## 2020-06-18 RX ORDER — TOPIRAMATE 25 MG/1
50 TABLET ORAL DAILY
Status: DISCONTINUED | OUTPATIENT
Start: 2020-06-18 | End: 2020-06-19 | Stop reason: HOSPADM

## 2020-06-18 RX ORDER — ACETAMINOPHEN 500 MG
1000 TABLET ORAL ONCE
Status: COMPLETED | OUTPATIENT
Start: 2020-06-18 | End: 2020-06-18

## 2020-06-18 RX ORDER — SUMATRIPTAN 100 MG/1
100 TABLET, FILM COATED ORAL
Status: DISPENSED | OUTPATIENT
Start: 2020-06-18 | End: 2020-06-18

## 2020-06-18 RX ADMIN — Medication 10 ML: at 20:32

## 2020-06-18 RX ADMIN — ACETAMINOPHEN 1000 MG: 500 TABLET, FILM COATED ORAL at 11:26

## 2020-06-18 RX ADMIN — HYDROCHLOROTHIAZIDE 25 MG: 25 TABLET ORAL at 13:46

## 2020-06-18 RX ADMIN — NITROGLYCERIN 0.4 MG: 0.4 TABLET, ORALLY DISINTEGRATING SUBLINGUAL at 10:10

## 2020-06-18 RX ADMIN — LISINOPRIL 10 MG: 10 TABLET ORAL at 13:46

## 2020-06-18 RX ADMIN — NITROGLYCERIN 0.4 MG: 0.4 TABLET, ORALLY DISINTEGRATING SUBLINGUAL at 10:03

## 2020-06-18 RX ADMIN — NITROGLYCERIN 0.4 MG: 0.4 TABLET, ORALLY DISINTEGRATING SUBLINGUAL at 09:53

## 2020-06-18 ASSESSMENT — ENCOUNTER SYMPTOMS
WHEEZING: 0
EYE DISCHARGE: 0
VOMITING: 0
EYE PAIN: 0
CONSTIPATION: 0
SHORTNESS OF BREATH: 0
SORE THROAT: 0
CHEST TIGHTNESS: 0
RHINORRHEA: 0
COUGH: 0
DIARRHEA: 0
ABDOMINAL PAIN: 0
BACK PAIN: 0
TROUBLE SWALLOWING: 0
EYE REDNESS: 0
BLOOD IN STOOL: 0
SINUS PRESSURE: 0
COLOR CHANGE: 0
NAUSEA: 0
FACIAL SWELLING: 0

## 2020-06-18 ASSESSMENT — PAIN DESCRIPTION - PROGRESSION: CLINICAL_PROGRESSION: NOT CHANGED

## 2020-06-18 ASSESSMENT — PAIN DESCRIPTION - DESCRIPTORS: DESCRIPTORS: TIGHTNESS

## 2020-06-18 ASSESSMENT — PAIN DESCRIPTION - ONSET: ONSET: SUDDEN

## 2020-06-18 ASSESSMENT — PAIN DESCRIPTION - ORIENTATION: ORIENTATION: MID

## 2020-06-18 ASSESSMENT — PAIN SCALES - GENERAL
PAINLEVEL_OUTOF10: 3
PAINLEVEL_OUTOF10: 3
PAINLEVEL_OUTOF10: 2
PAINLEVEL_OUTOF10: 1
PAINLEVEL_OUTOF10: 0

## 2020-06-18 ASSESSMENT — PAIN DESCRIPTION - PAIN TYPE: TYPE: ACUTE PAIN

## 2020-06-18 ASSESSMENT — PAIN DESCRIPTION - LOCATION: LOCATION: CHEST

## 2020-06-18 NOTE — CONSULTS
Admission medications    Medication Sig Start Date End Date Taking? Authorizing Provider   hydroCHLOROthiazide (HYDRODIURIL) 25 MG tablet Take 1 tablet by mouth every morning 5/28/20  Yes Mayte Busch PA-C   lisinopril (PRINIVIL;ZESTRIL) 10 MG tablet TAKE 1 TABLET BY MOUTH ONE TIME A DAY  12/17/19  Yes Mayte Busch PA-C   topiramate (TOPAMAX) 25 MG tablet Take 1 tablet qd x4 days, then 2 tablets bid x 4days, then 2 tablets qam and 1 tablet qhs x4days, then 2 tablets bid  Patient taking differently: Take 50 mg by mouth daily  7/9/19  Yes Mayte Busch PA-C   Multiple Vitamin (MULTI-DAY) TABS Take 1 tablet by mouth daily   Yes Mayte Busch PA-C   mometasone (NASONEX) 50 MCG/ACT nasal spray 2 sprays by Nasal route daily 8/16/19   Mayte Busch PA-C   ibuprofen (ADVIL;MOTRIN) 600 MG tablet Take 1 tablet by mouth every 6-8 hours as needed 7/28/19   Historical Provider, MD   naratriptan (AMERGE) 2.5 MG tablet Take 1 tablet by mouth once as needed for Migraine 2.5 mg at onset of headache, may repeat in 4 hours if needed 7/9/19 7/9/19  aMyte Busch PA-C      Current Facility-Administered Medications: sodium chloride flush 0.9 % injection 10 mL, 10 mL, Intravenous, 2 times per day  sodium chloride flush 0.9 % injection 10 mL, 10 mL, Intravenous, PRN  acetaminophen (TYLENOL) tablet 650 mg, 650 mg, Oral, Q4H PRN  enoxaparin (LOVENOX) injection 40 mg, 40 mg, Subcutaneous, Daily  hydroCHLOROthiazide (HYDRODIURIL) tablet 25 mg, 25 mg, Oral, QAM  lisinopril (PRINIVIL;ZESTRIL) tablet 10 mg, 10 mg, Oral, Daily  topiramate (TOPAMAX) tablet 50 mg, 50 mg, Oral, Daily  naratriptan (AMERGE) tablet 2.5 mg, 2.5 mg, Oral, Once PRN    Allergies:  Patient has no known allergies. Social History:   reports that she has never smoked. She has never used smokeless tobacco. She reports current alcohol use. She reports that she does not use drugs.      Family History: family history includes Arthritis

## 2020-06-18 NOTE — ED PROVIDER NOTES
flank pain, frequency, genital sores and hematuria. Musculoskeletal: Negative for arthralgias, back pain, gait problem, joint swelling, myalgias and neck pain. Skin: Negative for color change, pallor, rash and wound. Neurological: Negative for dizziness, tremors, seizures, syncope, speech difficulty, weakness, numbness and headaches. Psychiatric/Behavioral: Negative for confusion, decreased concentration, hallucinations, self-injury, sleep disturbance and suicidal ideas. PAST MEDICAL HISTORY     Past Medical History:   Diagnosis Date    Herpes labialis     Hypertension     Uterine leiomyoma        SURGICAL HISTORY       Past Surgical History:   Procedure Laterality Date     SECTION      HYSTERECTOMY  2018    With Removal of Submucous Leiomyomata    LAPAROSCOPY      Excis Endometriotic Tissue Uterosacral Ligaments       CURRENT MEDICATIONS       Previous Medications    HYDROCHLOROTHIAZIDE (HYDRODIURIL) 25 MG TABLET    Take 1 tablet by mouth every morning    IBUPROFEN (ADVIL;MOTRIN) 600 MG TABLET    Take 1 tablet by mouth every 6-8 hours as needed    LISINOPRIL (PRINIVIL;ZESTRIL) 10 MG TABLET    TAKE 1 TABLET BY MOUTH ONE TIME A DAY     MOMETASONE (NASONEX) 50 MCG/ACT NASAL SPRAY    2 sprays by Nasal route daily    MULTIPLE VITAMIN (MULTI-DAY) TABS    Take 1 tablet by mouth daily    NARATRIPTAN (AMERGE) 2.5 MG TABLET    Take 1 tablet by mouth once as needed for Migraine 2.5 mg at onset of headache, may repeat in 4 hours if needed    TOPIRAMATE (TOPAMAX) 25 MG TABLET    Take 1 tablet qd x4 days, then 2 tablets bid x 4days, then 2 tablets qam and 1 tablet qhs x4days, then 2 tablets bid       ALLERGIES     has No Known Allergies. FAMILY HISTORY     [unfilled]     SOCIAL HISTORY      reports that she has never smoked. She has never used smokeless tobacco. She reports current alcohol use. She reports that she does not use drugs.     PHYSICAL EXAM     INITIAL VITALS: /66   Pulse 82 Temp 98.3 °F (36.8 °C) (Oral)   Resp 18   Ht 5' 4\" (1.626 m)   Wt 198 lb (89.8 kg)   SpO2 96%   BMI 33.99 kg/m²      Physical Exam  Vitals signs and nursing note reviewed. Constitutional:       General: She is not in acute distress. Appearance: She is well-developed. She is not diaphoretic. HENT:      Head: Normocephalic and atraumatic. Eyes:      General: No scleral icterus. Right eye: No discharge. Left eye: No discharge. Conjunctiva/sclera: Conjunctivae normal.      Pupils: Pupils are equal, round, and reactive to light. Cardiovascular:      Rate and Rhythm: Normal rate and regular rhythm. Heart sounds: Normal heart sounds. No murmur. No friction rub. No gallop. Pulmonary:      Effort: Pulmonary effort is normal. No respiratory distress. Breath sounds: Normal breath sounds. No wheezing or rales. Chest:      Chest wall: No tenderness. Abdominal:      General: Bowel sounds are normal. There is no distension. Palpations: Abdomen is soft. There is no mass. Tenderness: There is no abdominal tenderness. There is no guarding or rebound. Musculoskeletal: Normal range of motion. General: No tenderness. Skin:     General: Skin is warm and dry. Coloration: Skin is not pale. Findings: No erythema or rash. Neurological:      Mental Status: She is alert and oriented to person, place, and time. Cranial Nerves: No cranial nerve deficit. Sensory: No sensory deficit. Motor: No abnormal muscle tone. Coordination: Coordination normal.      Deep Tendon Reflexes: Reflexes normal.   Psychiatric:         Behavior: Behavior normal.         Thought Content: Thought content normal.         Judgment: Judgment normal.         MEDICAL DECISION MAKING:     Patient symptoms are concerning for possible cardiac events. Patient has been having increased fatigue on and off for the last week or 2 along with this chest pain.   Will get a full work-up including a d-dimer and thyroid. DIAGNOSTIC RESULTS     EKG: All EKG's are interpreted by the Emergency Department Physician who either signs or Co-signs this chart in the absence of a cardiologist.    EKG Interpretation    Interpreted by emergency department physician    Rhythm: normal sinus   Rate: normal  Axis: normal  Ectopy: none  Conduction: normal  ST Segments: nonspecific changes  T Waves: non specific changes  Q Waves: none    Clinical Impression: EKG: normal sinus rhythm, nonspecific ST and T waves changes. Ángel Guardado        RADIOLOGY:All plain film, CT, MRI, and formal ultrasound images (except ED bedside ultrasound)are read by the radiologist and interpretations are directly viewed by the emergency physician. Xr Chest Portable    Result Date: 6/18/2020  EXAMINATION: ONE XRAY VIEW OF THE CHEST 6/18/2020 10:00 am COMPARISON: None. HISTORY: ORDERING SYSTEM PROVIDED HISTORY: Chest Pain TECHNOLOGIST PROVIDED HISTORY: Chest Pain Reason for Exam: sob Acuity: Unknown Type of Exam: Unknown FINDINGS: AP portable view of the chest time stamped at 959 hours demonstrates overlying cardiac monitoring electrodes. Heart size is borderline. No vascular congestion, focal consolidation, effusion, or pneumothorax is noted. Osseous structures and mediastinal contours are unremarkable. Borderline cardiac size. Lung fields are clear without acute pulmonary findings. LABS: All lab results were reviewed bymyself, and all abnormals are listed below.   Labs Reviewed   BASIC METABOLIC PANEL - Abnormal; Notable for the following components:       Result Value    Glucose 147 (*)     Potassium 3.6 (*)     All other components within normal limits   CBC WITH AUTO DIFFERENTIAL - Abnormal; Notable for the following components:    Seg Neutrophils 68 (*)     All other components within normal limits   TROP/MYOGLOBIN - Abnormal; Notable for the following components:    Myoglobin <21 (*)     All other

## 2020-06-18 NOTE — ED NOTES
Pt ambulates to restroom- gait steady-Pt c/o TITUS- relayed to Dr Jane Florence- awaiting orders     Joy Holley, CRISTA  06/18/20 [id number]

## 2020-06-19 ENCOUNTER — APPOINTMENT (OUTPATIENT)
Dept: NUCLEAR MEDICINE | Age: 45
End: 2020-06-19
Payer: COMMERCIAL

## 2020-06-19 VITALS
HEIGHT: 64 IN | SYSTOLIC BLOOD PRESSURE: 129 MMHG | TEMPERATURE: 98.2 F | DIASTOLIC BLOOD PRESSURE: 60 MMHG | OXYGEN SATURATION: 97 % | HEART RATE: 87 BPM | RESPIRATION RATE: 15 BRPM | WEIGHT: 198 LBS | BODY MASS INDEX: 33.8 KG/M2

## 2020-06-19 LAB
EKG ATRIAL RATE: 93 BPM
EKG P AXIS: 25 DEGREES
EKG P-R INTERVAL: 140 MS
EKG Q-T INTERVAL: 370 MS
EKG QRS DURATION: 74 MS
EKG QTC CALCULATION (BAZETT): 460 MS
EKG R AXIS: -5 DEGREES
EKG T AXIS: -3 DEGREES
EKG VENTRICULAR RATE: 93 BPM
LV EF: 51 %
LVEF MODALITY: NORMAL

## 2020-06-19 PROCEDURE — 93010 ELECTROCARDIOGRAM REPORT: CPT | Performed by: INTERNAL MEDICINE

## 2020-06-19 PROCEDURE — 78452 HT MUSCLE IMAGE SPECT MULT: CPT

## 2020-06-19 PROCEDURE — A9500 TC99M SESTAMIBI: HCPCS | Performed by: FAMILY MEDICINE

## 2020-06-19 PROCEDURE — G0378 HOSPITAL OBSERVATION PER HR: HCPCS

## 2020-06-19 PROCEDURE — 6370000000 HC RX 637 (ALT 250 FOR IP): Performed by: FAMILY MEDICINE

## 2020-06-19 PROCEDURE — 2580000003 HC RX 258: Performed by: FAMILY MEDICINE

## 2020-06-19 PROCEDURE — 3430000000 HC RX DIAGNOSTIC RADIOPHARMACEUTICAL: Performed by: FAMILY MEDICINE

## 2020-06-19 PROCEDURE — 93017 CV STRESS TEST TRACING ONLY: CPT

## 2020-06-19 RX ORDER — METOPROLOL TARTRATE 5 MG/5ML
5 INJECTION INTRAVENOUS EVERY 5 MIN PRN
Status: ACTIVE | OUTPATIENT
Start: 2020-06-19 | End: 2020-06-19

## 2020-06-19 RX ORDER — SODIUM CHLORIDE 9 MG/ML
500 INJECTION, SOLUTION INTRAVENOUS CONTINUOUS PRN
Status: ACTIVE | OUTPATIENT
Start: 2020-06-19 | End: 2020-06-19

## 2020-06-19 RX ORDER — SODIUM CHLORIDE 0.9 % (FLUSH) 0.9 %
10 SYRINGE (ML) INJECTION PRN
Status: DISCONTINUED | OUTPATIENT
Start: 2020-06-19 | End: 2020-06-19 | Stop reason: HOSPADM

## 2020-06-19 RX ORDER — ALBUTEROL SULFATE 90 UG/1
2 AEROSOL, METERED RESPIRATORY (INHALATION) PRN
Status: ACTIVE | OUTPATIENT
Start: 2020-06-19 | End: 2020-06-19

## 2020-06-19 RX ORDER — ATROPINE SULFATE 0.1 MG/ML
0.5 INJECTION INTRAVENOUS EVERY 5 MIN PRN
Status: ACTIVE | OUTPATIENT
Start: 2020-06-19 | End: 2020-06-19

## 2020-06-19 RX ORDER — SODIUM CHLORIDE 0.9 % (FLUSH) 0.9 %
10 SYRINGE (ML) INJECTION PRN
Status: ACTIVE | OUTPATIENT
Start: 2020-06-19 | End: 2020-06-19

## 2020-06-19 RX ORDER — NITROGLYCERIN 0.4 MG/1
0.4 TABLET SUBLINGUAL EVERY 5 MIN PRN
Status: ACTIVE | OUTPATIENT
Start: 2020-06-19 | End: 2020-06-19

## 2020-06-19 RX ADMIN — Medication 10 ML: at 09:35

## 2020-06-19 RX ADMIN — TETRAKIS(2-METHOXYISOBUTYLISOCYANIDE)COPPER(I) TETRAFLUOROBORATE 36.5 MILLICURIE: 1 INJECTION, POWDER, LYOPHILIZED, FOR SOLUTION INTRAVENOUS at 09:40

## 2020-06-19 RX ADMIN — Medication 10 ML: at 11:09

## 2020-06-19 RX ADMIN — LISINOPRIL 10 MG: 10 TABLET ORAL at 11:09

## 2020-06-19 RX ADMIN — Medication 10 ML: at 07:01

## 2020-06-19 RX ADMIN — HYDROCHLOROTHIAZIDE 25 MG: 25 TABLET ORAL at 11:09

## 2020-06-19 RX ADMIN — TETRAKIS(2-METHOXYISOBUTYLISOCYANIDE)COPPER(I) TETRAFLUOROBORATE 11.4 MILLICURIE: 1 INJECTION, POWDER, LYOPHILIZED, FOR SOLUTION INTRAVENOUS at 07:00

## 2020-06-19 RX ADMIN — Medication 10 ML: at 09:40

## 2020-06-19 NOTE — PROGRESS NOTES
STRESS TESTCOMPLETED, /79, , PATIENT DENIES PAIN BUT HAS SHORTNESS OF BREATH, WATER GIVEN AND PATIENT RESTING IN BED

## 2020-06-19 NOTE — PLAN OF CARE
Problem: Cardiac:  Goal: Ability to maintain an adequate cardiac output will improve  Description: Ability to maintain an adequate cardiac output will improve  6/19/2020 0344 by Dewayne Macdonald RN  Outcome: Ongoing  Note: Cardiac monitor remains in place. Patient remains in NSR. Problem: Cardiac:  Goal: Hemodynamic stability will improve  Description: Hemodynamic stability will improve  6/19/2020 0344 by Dewayne Macdonald RN  Outcome: Ongoing  Note: Vital signs stable.      Problem: Fluid Volume:  Goal: Will show no signs or symptoms of fluid imbalance  Description: Will show no signs or symptoms of fluid imbalance  Outcome: Ongoing

## 2020-06-21 NOTE — DISCHARGE SUMMARY
25 MG tablet  Take 1 tablet qd x4 days, then 2 tablets bid x 4days, then 2 tablets qam and 1 tablet qhs x4days, then 2 tablets bid                  Activity: activity as tolerated    Diet: regular diet    Time Spent on discharge is more than 15 minutes in the examination, evaluation, counseling and review of medications and discharge plan. Electronically signed by Tala Fung MD on 6/21/2020 at 1:03 PM     Thank you Dr. Latoya Cuadra PA-C for the opportunity to be involved in this patient's care.

## 2020-06-22 ENCOUNTER — TELEPHONE (OUTPATIENT)
Dept: PRIMARY CARE CLINIC | Age: 45
End: 2020-06-22

## 2020-06-23 ENCOUNTER — TELEPHONE (OUTPATIENT)
Dept: PRIMARY CARE CLINIC | Age: 45
End: 2020-06-23

## 2020-06-23 NOTE — TELEPHONE ENCOUNTER
[facility]olly [facility] Transitions Initial Follow Up Call    Outreach made within 2 business days of discharge: Yes    Patient: Abigail Mejia Patient : 1975   MRN: [id number]  Reason for Admission: There are no discharge diagnoses documented for the most recent discharge. Discharge Date: 20       Spoke with: Heidi Kilpatrick    Discharge department/facility: [address] [place] [address] Interactive Patient Contact:  Was patient able to fill all prescriptions: Yes  Was patient instructed to bring all medications to the follow-up visit: Yes  Is patient taking all medications as directed in the discharge summary?  Yes  Does patient understand their discharge instructions: Yes  Does patient have questions or concerns that need addressed prior to 7-14 day follow up office visit: no    Scheduled appointment with PCP within 7-14 days  APT SCHEDULED     Follow Up  Future Appointments   Date Time Provider Yanick Lozano   2020  3:45 PM DAVIDE Pizarro, [address]

## 2020-06-25 ENCOUNTER — OFFICE VISIT (OUTPATIENT)
Dept: PRIMARY CARE CLINIC | Age: 45
End: 2020-06-25
Payer: COMMERCIAL

## 2020-06-25 VITALS
OXYGEN SATURATION: 97 % | DIASTOLIC BLOOD PRESSURE: 74 MMHG | HEIGHT: 64 IN | BODY MASS INDEX: 34.35 KG/M2 | HEART RATE: 98 BPM | SYSTOLIC BLOOD PRESSURE: 126 MMHG | WEIGHT: 201.2 LBS | TEMPERATURE: 98.2 F

## 2020-06-25 PROCEDURE — 99495 TRANSJ CARE MGMT MOD F2F 14D: CPT | Performed by: PHYSICIAN ASSISTANT

## 2020-06-25 PROCEDURE — 1111F DSCHRG MED/CURRENT MED MERGE: CPT | Performed by: PHYSICIAN ASSISTANT

## 2020-06-25 RX ORDER — HYDROCHLOROTHIAZIDE 25 MG/1
25 TABLET ORAL EVERY MORNING
Qty: 30 TABLET | Refills: 5 | Status: SHIPPED | OUTPATIENT
Start: 2020-06-25 | End: 2020-10-30 | Stop reason: SDUPTHER

## 2020-06-25 RX ORDER — OMEPRAZOLE 20 MG/1
20 CAPSULE, DELAYED RELEASE ORAL
Qty: 30 CAPSULE | Refills: 5 | Status: SHIPPED | OUTPATIENT
Start: 2020-06-25 | End: 2021-02-22 | Stop reason: SDUPTHER

## 2020-06-25 ASSESSMENT — PATIENT HEALTH QUESTIONNAIRE - PHQ9
1. LITTLE INTEREST OR PLEASURE IN DOING THINGS: 0
2. FEELING DOWN, DEPRESSED OR HOPELESS: 0
SUM OF ALL RESPONSES TO PHQ9 QUESTIONS 1 & 2: 0
SUM OF ALL RESPONSES TO PHQ QUESTIONS 1-9: 0
SUM OF ALL RESPONSES TO PHQ QUESTIONS 1-9: 0

## 2020-06-25 NOTE — PROGRESS NOTES
Post-Discharge Transitional Care Management Services or Hospital Follow Up      [facility]   YOB: 1975    Date of Office Visit:  6/25/2020  Date of Hospital Admission: 6/18/20  Date of Hospital Discharge: 6/19/20  Risk of hospital readmission (high >=14%.  Medium >=10%) :No data recorded    Care management risk score Rising risk (score 2-5) and Complex Care (Scores >=6): 4     Non face to face  following discharge, date last encounter closed (first attempt may have been earlier): 6/23/2020  2:04 PM    Call initiated 2 business days of discharge: Yes    Patient Active Problem List   Diagnosis    Acne    Cold sore    Dermatofibroma    Dyslipidemia    Endometriosis    Fatigue    Hirsutism    IUD mechanical complication    Menorrhagia with irregular cycle    Menstrual migraine    Migraine headache    Mood swing    Neoplasm of uncertain behavior of skin of back    Ovarian cyst    Paronychia    Premenstrual dysphoric disorder    Skin tag    Uterine fibroid    Vaginitis    Vulvitis    Obesity    Essential hypertension    Tension headache    Neuralgia    Arthralgia of left temporomandibular joint    Chronic facial pain    Chest pain       No Known Allergies    Medications listed as ordered at the time of discharge from [facility] Medication Instructions MARTHA:    Printed on:06/25/20 0661   Medication Information                      hydroCHLOROthiazide (HYDRODIURIL) 25 MG tablet  Take 1 tablet by mouth every morning             ibuprofen (ADVIL;MOTRIN) 600 MG tablet  Take 1 tablet by mouth every 6-8 hours as needed             lisinopril (PRINIVIL;ZESTRIL) 10 MG tablet  TAKE 1 TABLET BY MOUTH ONE TIME A DAY              mometasone (NASONEX) 50 MCG/ACT nasal spray  2 sprays by Nasal route daily             Multiple Vitamin (MULTI-DAY) TABS  Take 1 tablet by mouth daily             naratriptan (AMERGE) 2.5 MG tablet  Take 1 tablet by mouth once as needed for Migraine 2.5 mg at onset of headache, may repeat in 4 hours if needed             omeprazole (PRILOSEC) 20 MG delayed release capsule  Take 1 capsule by mouth every morning (before breakfast)             topiramate (TOPAMAX) 25 MG tablet  Take 1 tablet qd x4 days, then 2 tablets bid x 4days, then 2 tablets qam and 1 tablet qhs x4days, then 2 tablets bid                   Medications marked \"taking\" at this time  Outpatient Medications Marked as Taking for the 6/25/20 encounter (Office Visit) with Tj Dawson PA-C   Medication Sig Dispense Refill    omeprazole (PRILOSEC) 20 MG delayed release capsule Take 1 capsule by mouth every morning (before breakfast) 30 capsule 5    hydroCHLOROthiazide (HYDRODIURIL) 25 MG tablet Take 1 tablet by mouth every morning 30 tablet 5    lisinopril (PRINIVIL;ZESTRIL) 10 MG tablet TAKE 1 TABLET BY MOUTH ONE TIME A DAY  30 tablet 5    mometasone (NASONEX) 50 MCG/ACT nasal spray 2 sprays by Nasal route daily 1 Inhaler 3    ibuprofen (ADVIL;MOTRIN) 600 MG tablet Take 1 tablet by mouth every 6-8 hours as needed      naratriptan (AMERGE) 2.5 MG tablet Take 1 tablet by mouth once as needed for Migraine 2.5 mg at onset of headache, may repeat in 4 hours if needed 9 tablet 0    Multiple Vitamin (MULTI-DAY) TABS Take 1 tablet by mouth daily          Medications patient taking as of now reconciled against medications ordered at time of hospital discharge: Yes    Chief Complaint   Patient presents with    Follow-Up from Hospital     patient went to [facility] ER due to chest pressure and tightness. Patient said she had a sharp pain on her left side as well. Patient said she had some back pain as well. Patient was DC from [facility] 6/19/2020. Pt had stress test done and said it came back fine. Patient has not had any chest pressure/tightness since being at the hospital but she is still having the sharp pain on her left side.         History of Present illness - Follow up of Hospital diagnosis(es): Work up with some tingling in head and face and arm. Next morning had some substernal chest discomfort. Horrible indigestion lately and some scapular pain. Was observed overnight and had a normal stress test.      Inpatient course: Discharge summary reviewed- see chart. Interval history/Current status: Never broke out in a rash. Had some overwhelming fatigue X2 as well. Belching a lot and lots of gas. A comprehensive review of systems was negative except for what was noted in the HPI. Vitals:    06/25/20 1553   BP: 126/74   Pulse: 98   Temp: 98.2 °F (36.8 °C)   SpO2: 97%   Weight: 201 lb 3.2 oz (91.3 kg)   Height: 5' 4\" (1.626 m)     Body mass index is 34.54 kg/m². Wt Readings from Last 3 Encounters:   06/25/20 201 lb 3.2 oz (91.3 kg)   06/18/20 198 lb (89.8 kg)   10/01/19 187 lb 9.6 oz (85.1 kg)     BP Readings from Last 3 Encounters:   06/25/20 126/74   06/19/20 129/60   10/01/19 126/80        Physical Exam:  General Appearance: alert and oriented to person, place and time, well-developed and well-nourished, in no acute distress  Skin: warm and dry, no rash or erythema and no rash or erythema  Pulmonary/Chest: clear to auscultation bilaterally- no wheezes, rales or rhonchi, normal air movement, no respiratory distress  Cardiovascular: normal rate, normal S1 and S2, no gallops, intact distal pulses and no carotid bruits  Abdomen: soft, non-tender, non-distended, normal bowel sounds, no masses or organomegaly and tenderness present- Left lower quad,  without rebound and guarding  Extremities: no edema    Assessment/Plan:  1. Belching    - US Gallbladder Ruq; Future  - omeprazole 20mg 1 po qd  -watch diet as discussed    2. Indigestion    - US Gallbladder Ruq; Future    3. Acute pain of left shoulder    - US Gallbladder Ruq; Future      Refilled HCTZ which is wroking well for her edema and kidney function checked in hospital and is fine.       Medical Decision Making: moderate complexity

## 2020-12-03 RX ORDER — HYDROCHLOROTHIAZIDE 25 MG/1
TABLET ORAL
Qty: 30 TABLET | Refills: 0 | Status: SHIPPED | OUTPATIENT
Start: 2020-12-03 | End: 2020-12-14 | Stop reason: SDUPTHER

## 2020-12-14 ENCOUNTER — OFFICE VISIT (OUTPATIENT)
Dept: PRIMARY CARE CLINIC | Age: 45
End: 2020-12-14
Payer: COMMERCIAL

## 2020-12-14 VITALS
HEART RATE: 97 BPM | OXYGEN SATURATION: 98 % | HEIGHT: 64 IN | TEMPERATURE: 96.6 F | DIASTOLIC BLOOD PRESSURE: 76 MMHG | BODY MASS INDEX: 35.51 KG/M2 | WEIGHT: 208 LBS | SYSTOLIC BLOOD PRESSURE: 124 MMHG

## 2020-12-14 PROBLEM — R92.2 DENSE BREAST TISSUE ON MAMMOGRAM: Status: ACTIVE | Noted: 2017-12-04

## 2020-12-14 PROBLEM — R92.30 DENSE BREAST TISSUE ON MAMMOGRAM: Status: ACTIVE | Noted: 2017-12-04

## 2020-12-14 PROBLEM — Z90.710 S/P TAH (TOTAL ABDOMINAL HYSTERECTOMY): Status: ACTIVE | Noted: 2018-03-06

## 2020-12-14 PROCEDURE — 90686 IIV4 VACC NO PRSV 0.5 ML IM: CPT | Performed by: PHYSICIAN ASSISTANT

## 2020-12-14 PROCEDURE — 90471 IMMUNIZATION ADMIN: CPT | Performed by: PHYSICIAN ASSISTANT

## 2020-12-14 PROCEDURE — 99396 PREV VISIT EST AGE 40-64: CPT | Performed by: PHYSICIAN ASSISTANT

## 2020-12-14 RX ORDER — LISINOPRIL 10 MG/1
10 TABLET ORAL DAILY
Qty: 30 TABLET | Refills: 1 | Status: SHIPPED | OUTPATIENT
Start: 2020-12-14 | End: 2021-02-22 | Stop reason: SDUPTHER

## 2020-12-14 RX ORDER — NARATRIPTAN 2.5 MG/1
2.5 TABLET ORAL
Qty: 9 TABLET | Refills: 2 | Status: SHIPPED | OUTPATIENT
Start: 2020-12-14 | End: 2021-03-23 | Stop reason: SDUPTHER

## 2020-12-14 RX ORDER — HYDROCHLOROTHIAZIDE 25 MG/1
TABLET ORAL
Qty: 30 TABLET | Refills: 2 | Status: SHIPPED | OUTPATIENT
Start: 2020-12-14 | End: 2021-04-12 | Stop reason: SDUPTHER

## 2020-12-14 ASSESSMENT — ENCOUNTER SYMPTOMS
DIARRHEA: 0
ABDOMINAL PAIN: 0
VOICE CHANGE: 0
BLOOD IN STOOL: 0
CONSTIPATION: 0
NAUSEA: 0
EYE PAIN: 0
SHORTNESS OF BREATH: 0
TROUBLE SWALLOWING: 0
VOMITING: 0
CHEST TIGHTNESS: 0
COUGH: 0
EYE ITCHING: 0
BACK PAIN: 0

## 2020-12-15 ENCOUNTER — HOSPITAL ENCOUNTER (OUTPATIENT)
Age: 45
Setting detail: SPECIMEN
Discharge: HOME | End: 2020-12-15
Payer: COMMERCIAL

## 2020-12-15 LAB
ANION GAP SERPL CALCULATED.3IONS-SCNC: 16 MMOL/L (ref 9–17)
BUN BLDV-MCNC: 15 MG/DL (ref 6–20)
BUN/CREAT BLD: ABNORMAL (ref 9–20)
CALCIUM SERPL-MCNC: 9.6 MG/DL (ref 8.6–10.4)
CHLORIDE BLD-SCNC: 102 MMOL/L (ref 98–107)
CHOLESTEROL/HDL RATIO: 5.2
CHOLESTEROL: 249 MG/DL
CO2: 23 MMOL/L (ref 20–31)
CREAT SERPL-MCNC: 0.69 MG/DL (ref 0.5–0.9)
GFR AFRICAN AMERICAN: >60 ML/MIN
GFR NON-AFRICAN AMERICAN: >60 ML/MIN
GFR SERPL CREATININE-BSD FRML MDRD: ABNORMAL ML/MIN/{1.73_M2}
GFR SERPL CREATININE-BSD FRML MDRD: ABNORMAL ML/MIN/{1.73_M2}
GLUCOSE FASTING: 100 MG/DL (ref 70–99)
HDLC SERPL-MCNC: 48 MG/DL
LDL CHOLESTEROL: 164 MG/DL (ref 0–130)
POTASSIUM SERPL-SCNC: 4.4 MMOL/L (ref 3.7–5.3)
SODIUM BLD-SCNC: 141 MMOL/L (ref 135–144)
TRIGL SERPL-MCNC: 185 MG/DL
VLDLC SERPL CALC-MCNC: ABNORMAL MG/DL (ref 1–30)

## 2020-12-31 DIAGNOSIS — R10.32 LEFT LOWER QUADRANT PAIN: ICD-10-CM

## 2021-01-05 DIAGNOSIS — Z12.31 VISIT FOR SCREENING MAMMOGRAM: ICD-10-CM

## 2021-02-01 ENCOUNTER — OFFICE VISIT (OUTPATIENT)
Dept: PRIMARY CARE CLINIC | Age: 46
End: 2021-02-01
Payer: COMMERCIAL

## 2021-02-01 ENCOUNTER — NURSE TRIAGE (OUTPATIENT)
Dept: OTHER | Facility: CLINIC | Age: 46
End: 2021-02-01

## 2021-02-01 VITALS
OXYGEN SATURATION: 98 % | DIASTOLIC BLOOD PRESSURE: 80 MMHG | TEMPERATURE: 97.2 F | WEIGHT: 208 LBS | HEART RATE: 94 BPM | BODY MASS INDEX: 35.51 KG/M2 | HEIGHT: 64 IN | SYSTOLIC BLOOD PRESSURE: 126 MMHG

## 2021-02-01 DIAGNOSIS — R20.2 PARESTHESIA OF UPPER EXTREMITY: Primary | ICD-10-CM

## 2021-02-01 PROCEDURE — 99213 OFFICE O/P EST LOW 20 MIN: CPT | Performed by: PHYSICIAN ASSISTANT

## 2021-02-01 RX ORDER — BUSPIRONE HYDROCHLORIDE 5 MG/1
5 TABLET ORAL 2 TIMES DAILY PRN
Qty: 60 TABLET | Refills: 0 | Status: SHIPPED | OUTPATIENT
Start: 2021-02-01 | End: 2021-04-12 | Stop reason: SDUPTHER

## 2021-02-01 RX ORDER — PREDNISONE 20 MG/1
20 TABLET ORAL 2 TIMES DAILY
Qty: 10 TABLET | Refills: 0 | Status: SHIPPED | OUTPATIENT
Start: 2021-02-01 | End: 2021-02-06

## 2021-02-01 SDOH — ECONOMIC STABILITY: INCOME INSECURITY: HOW HARD IS IT FOR YOU TO PAY FOR THE VERY BASICS LIKE FOOD, HOUSING, MEDICAL CARE, AND HEATING?: NOT HARD AT ALL

## 2021-02-01 SDOH — ECONOMIC STABILITY: FOOD INSECURITY: WITHIN THE PAST 12 MONTHS, YOU WORRIED THAT YOUR FOOD WOULD RUN OUT BEFORE YOU GOT MONEY TO BUY MORE.: NEVER TRUE

## 2021-02-01 SDOH — ECONOMIC STABILITY: FOOD INSECURITY: WITHIN THE PAST 12 MONTHS, THE FOOD YOU BOUGHT JUST DIDN'T LAST AND YOU DIDN'T HAVE MONEY TO GET MORE.: NEVER TRUE

## 2021-02-01 SDOH — ECONOMIC STABILITY: TRANSPORTATION INSECURITY
IN THE PAST 12 MONTHS, HAS THE LACK OF TRANSPORTATION KEPT YOU FROM MEDICAL APPOINTMENTS OR FROM GETTING MEDICATIONS?: NO

## 2021-02-01 ASSESSMENT — ENCOUNTER SYMPTOMS
VOMITING: 1
SHORTNESS OF BREATH: 1
BACK PAIN: 0
TROUBLE SWALLOWING: 0
CHOKING: 0

## 2021-02-01 NOTE — PROGRESS NOTES
[facility]  [address] [place] [address]  Phone: [phone]  Fax: [phone]    Sofía Jimenez is a 55 y.o. female who presents today for her medical conditions/complaintsas noted below. Chief Complaint   Patient presents with    Numbness     numbess in both arms x 1 week. Patient said her arms feel like pins and needeles. Patient said this happends when carrying heavy bags from work. HPI:     HPI  Arms: numbness worse off and on for last 7 days. Today right arm has been consistently numb all day. Seems to happen after carrying heavy bags 3-4 bags. Some numbness for months now. Hx of trigeminal neuralgia that latest 7 months     Stress is really high right now with her parents both ill and choosing to live on their own. Current Outpatient Medications   Medication Sig Dispense Refill    predniSONE (DELTASONE) 20 MG tablet Take 1 tablet by mouth 2 times daily for 5 days 10 tablet 0    busPIRone (BUSPAR) 5 MG tablet Take 1 tablet by mouth 2 times daily as needed (anxiety) 60 tablet 0    lisinopril (PRINIVIL;ZESTRIL) 10 MG tablet Take 1 tablet by mouth daily 30 tablet 1    naratriptan (AMERGE) 2.5 MG tablet Take 1 tablet by mouth once as needed for Migraine 2.5 mg at onset of headache, may repeat in 4 hours if needed 9 tablet 2    hydroCHLOROthiazide (HYDRODIURIL) 25 MG tablet TAKE 1 TABLET BY MOUTH IN THE MORNING 30 tablet 2    omeprazole (PRILOSEC) 20 MG delayed release capsule Take 1 capsule by mouth every morning (before breakfast) 30 capsule 5    ibuprofen (ADVIL;MOTRIN) 600 MG tablet Take 1 tablet by mouth every 6-8 hours as needed      Multiple Vitamin (MULTI-DAY) TABS Take 1 tablet by mouth daily       No current facility-administered medications for this visit. No Known Allergies    Subjective:      Review of Systems   Constitutional: Negative for chills, diaphoresis and fever. Brachioradialis reflexes are 1+ on the right side and 1+ on the left side. Patellar reflexes are 1+ on the right side and 1+ on the left side. Achilles reflexes are 1+ on the right side and 2+ on the left side. Psychiatric:         Mood and Affect: Mood is anxious. Assessment:       Diagnosis Orders   1. Paresthesia of upper extremity  XR CERVICAL SPINE (2-3 VIEWS)    Vitamin B12    Magnesium        Plan:    Cervical spine x-ray  BW ordered  Prednisone burst   Start Buspar PRN for anxiety  Start exercise for anxiety. Return in about 1 month (around 3/1/2021) for recheck, Anxiety--1/2 hour.     Orders Placed This Encounter   Procedures    XR CERVICAL SPINE (2-3 VIEWS)     Standing Status:   Future     Standing Expiration Date:   2/1/2022     Order Specific Question:   Reason for exam:     Answer:   arm paresthesias    Vitamin B12     Standing Status:   Future     Standing Expiration Date:   2/1/2022    Magnesium     Standing Status:   Future     Standing Expiration Date:   2/1/2022     Orders Placed This Encounter   Medications    predniSONE (DELTASONE) 20 MG tablet     Sig: Take 1 tablet by mouth 2 times daily for 5 days     Dispense:  10 tablet     Refill:  0    busPIRone (BUSPAR) 5 MG tablet     Sig: Take 1 tablet by mouth 2 times daily as needed (anxiety)     Dispense:  60 tablet     Refill:  0           Electronically signed by Marcelene Schilder, PA-Con 2/1/2021 at 4:30 PM

## 2021-02-01 NOTE — TELEPHONE ENCOUNTER
Patient called [place]  at pre-service center [facility])  with red flag complaint. Brief description of triage: numbness of  Upper  Right arm today bilateral numbness x 7 days  bp 133/90  History of HTN, Lipidemia,  Reports having numbness that has been continuous on  Right arm upon waking up  Radiates to thumb area not improving     Triage indicates for patient to go to office now   Pt is in agreement with disposition  And care advise   Care advice provided, patient verbalizes understanding; denies any other questions or concerns; instructed to call back for any new or worsening symptoms. Writer provided warm transfer to [place]   at [facility] for appointment scheduling. Attention Provider: Thank you for allowing me to participate in the care of your patient. The patient was connected to triage in response to information provided to the [facility]. Please do not respond through this encounter as the response is not directed to a shared pool. Reason for Disposition   Neurologic deficit of gradual onset, ANY of the following: * Weakness of the face, arm, or leg on one side of the body * Numbness of the face, arm, or leg on one side of the body * Loss of speech or garbled speech    Answer Assessment - Initial Assessment Questions  1. SYMPTOM: \"What is the main symptom you are concerned about? \" (e.g., weakness, numbness)      numbenss of upper thumb   2. ONSET: \"When did this start? \" (minutes, hours, days; while sleeping)      7  Days  Consistent   Each  Day off and on   3. LAST NORMAL: \"When was the last time you were normal (no symptoms)? \"      Several days ago   4. PATTERN \"Does this come and go, or has it been constant since it started? \"  \"Is it present now? \"      Consistent  After  Carrying a bag  5. CARDIAC SYMPTOMS: \"Have you had any of the following symptoms: chest pain, difficulty breathing, palpitations? \"     Reports heart rate of   103- states this is her base line denies chest pain or trouble breathing 6. NEUROLOGIC SYMPTOMS: \"Have you had any of the following symptoms: headache, dizziness, vision loss, double vision, changes in speech, unsteady on your feet? \"      Headache  intermitent x 2d ays out of this week   7. OTHER SYMPTOMS: \"Do you have any other symptoms?\"        8. PREGNANCY: \"Is there any chance you are pregnant? \" \"When was your last menstrual period? \"     Hysterectomy    Protocols used: NEUROLOGIC DEFICIT-ADULT-[place]

## 2021-02-02 ENCOUNTER — HOSPITAL ENCOUNTER (OUTPATIENT)
Age: 46
Setting detail: SPECIMEN
Discharge: HOME | End: 2021-02-02
Payer: COMMERCIAL

## 2021-02-02 DIAGNOSIS — R20.2 PARESTHESIA OF UPPER EXTREMITY: ICD-10-CM

## 2021-02-02 LAB
MAGNESIUM: 2.2 MG/DL (ref 1.6–2.6)
VITAMIN B-12: 681 PG/ML (ref 232–1245)

## 2021-02-08 ENCOUNTER — TELEPHONE (OUTPATIENT)
Dept: PRIMARY CARE CLINIC | Age: 46
End: 2021-02-08

## 2021-02-08 DIAGNOSIS — N83.202 BILATERAL OVARIAN CYSTS: Primary | ICD-10-CM

## 2021-02-08 DIAGNOSIS — N83.201 BILATERAL OVARIAN CYSTS: Primary | ICD-10-CM

## 2021-02-09 ENCOUNTER — HOSPITAL ENCOUNTER (OUTPATIENT)
Age: 46
Discharge: HOME | End: 2021-02-11
Payer: COMMERCIAL

## 2021-02-09 ENCOUNTER — HOSPITAL ENCOUNTER (OUTPATIENT)
Dept: GENERAL RADIOLOGY | Age: 46
Discharge: HOME | End: 2021-02-11
Payer: COMMERCIAL

## 2021-02-09 ENCOUNTER — HOSPITAL ENCOUNTER (OUTPATIENT)
Dept: ULTRASOUND IMAGING | Age: 46
Discharge: HOME | End: 2021-02-11
Payer: COMMERCIAL

## 2021-02-09 DIAGNOSIS — N83.202 BILATERAL OVARIAN CYSTS: ICD-10-CM

## 2021-02-09 DIAGNOSIS — R20.2 PARESTHESIA OF UPPER EXTREMITY: ICD-10-CM

## 2021-02-09 DIAGNOSIS — N83.201 BILATERAL OVARIAN CYSTS: ICD-10-CM

## 2021-02-09 PROCEDURE — 76830 TRANSVAGINAL US NON-OB: CPT

## 2021-02-09 PROCEDURE — 72040 X-RAY EXAM NECK SPINE 2-3 VW: CPT

## 2021-02-09 PROCEDURE — 76856 US EXAM PELVIC COMPLETE: CPT

## 2021-02-26 ENCOUNTER — OFFICE VISIT (OUTPATIENT)
Dept: PRIMARY CARE CLINIC | Age: 46
End: 2021-02-26
Payer: COMMERCIAL

## 2021-02-26 VITALS
BODY MASS INDEX: 36.12 KG/M2 | DIASTOLIC BLOOD PRESSURE: 82 MMHG | SYSTOLIC BLOOD PRESSURE: 134 MMHG | OXYGEN SATURATION: 97 % | HEART RATE: 87 BPM | WEIGHT: 210.4 LBS | TEMPERATURE: 97.4 F

## 2021-02-26 DIAGNOSIS — M79.2 NEURALGIA: Primary | ICD-10-CM

## 2021-02-26 PROCEDURE — 99213 OFFICE O/P EST LOW 20 MIN: CPT | Performed by: NURSE PRACTITIONER

## 2021-02-26 RX ORDER — PREDNISONE 20 MG/1
TABLET ORAL
Qty: 18 TABLET | Refills: 0 | Status: SHIPPED | OUTPATIENT
Start: 2021-02-26 | End: 2021-03-08

## 2021-02-26 ASSESSMENT — ENCOUNTER SYMPTOMS
DIARRHEA: 0
SHORTNESS OF BREATH: 0
COUGH: 0
BACK PAIN: 0
NAUSEA: 0
CONSTIPATION: 0

## 2021-02-26 ASSESSMENT — PATIENT HEALTH QUESTIONNAIRE - PHQ9
SUM OF ALL RESPONSES TO PHQ9 QUESTIONS 1 & 2: 0
SUM OF ALL RESPONSES TO PHQ QUESTIONS 1-9: 0
2. FEELING DOWN, DEPRESSED OR HOPELESS: 0

## 2021-02-26 NOTE — PROGRESS NOTES
[facility]  [address] [place] [id number]  Dept: [phone]    Arya Edwards is a 55 y.o. female Established patient, who presents today for her medical conditions/complaintsas noted below. Chief Complaint   Patient presents with    Numbness     Bilateral arms- getting worse since carrying heavier things (supplies for work)    Other     Has seen Jatinder Prow for this, xray was done, pain in thumbs as well       HPI:     HPI    Reviewed prior notes None  Reviewed previous Imaging x-ray 21    She is carrying a lot of extra stuff to and from school. On 21 she was carrying a  Large amount and right arm went numb and then switched to left arm and then left arm went numb.  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 Lasted all night and continues today. Arm   weakness, achy(heavy) in the bicep all the way to the thumb. Although I only   rested the right arm over the chair, the feeling is in both arms. More of an ache now in biceps and thumbs.     LDL Cholesterol (mg/dL)   Date Value   12/15/2020 164 (H)   05/15/2019 154 (H)     LDL Calculated (mg/dL)   Date Value   2018 123   2017 180 (A)       (goal LDL is <100)   BUN (mg/dL)   Date Value   12/15/2020 15     Hemoglobin A1C (%)   Date Value   2020 5.6     TSH (mIU/L)   Date Value   2020 3.14     BP Readings from Last 3 Encounters:   21 134/82   21 126/80   20 124/76          (goal 120/80)    Past Medical History:   Diagnosis Date    Herpes labialis     Hypertension     Uterine leiomyoma       Past Surgical History:   Procedure Laterality Date     SECTION      HYSTERECTOMY  2018    With Removal of Submucous Leiomyomata    LAPAROSCOPY      Excis Endometriotic Tissue Uterosacral Ligaments Family History   Problem Relation Age of Onset    High Blood Pressure Mother     Depression Mother     Other Mother         Cluster Headaches; Fibromyalgia    Cancer Maternal Grandmother         breast    Heart Disease Maternal Grandfather     Cancer Paternal Grandmother         breast    Stroke Paternal Grandmother     Arthritis Paternal Grandmother     Cancer Paternal Grandfather         lung    Other Father         Alcoholism    Other Sister         Ovarian Neoplasm       Social History     Tobacco Use    Smoking status: Never Smoker    Smokeless tobacco: Never Used   Substance Use Topics    Alcohol use: Yes     Alcohol/week: 0.0 standard drinks      Current Outpatient Medications   Medication Sig Dispense Refill    predniSONE (DELTASONE) 20 MG tablet 3 tabs x 3 days, then 2 tabs x 3 days, then 1 tab x 3 days 18 tablet 0    omeprazole (PRILOSEC) 20 MG delayed release capsule Take 1 capsule by mouth every morning (before breakfast) 30 capsule 5    lisinopril (PRINIVIL;ZESTRIL) 10 MG tablet Take 1 tablet by mouth daily 30 tablet 5    busPIRone (BUSPAR) 5 MG tablet Take 1 tablet by mouth 2 times daily as needed (anxiety) 60 tablet 0    hydroCHLOROthiazide (HYDRODIURIL) 25 MG tablet TAKE 1 TABLET BY MOUTH IN THE MORNING 30 tablet 2    ibuprofen (ADVIL;MOTRIN) 600 MG tablet Take 1 tablet by mouth every 6-8 hours as needed      Multiple Vitamin (MULTI-DAY) TABS Take 1 tablet by mouth daily      naratriptan (AMERGE) 2.5 MG tablet Take 1 tablet by mouth once as needed for Migraine 2.5 mg at onset of headache, may repeat in 4 hours if needed 9 tablet 2     No current facility-administered medications for this visit.       No Known Allergies    Health Maintenance   Topic Date Due    Hepatitis C screen  1975    HIV screen  01/31/1990    Potassium monitoring  12/15/2021    Creatinine monitoring  12/15/2021    Diabetes screen  12/15/2023    Lipid screen  12/15/2025  DTaP/Tdap/Td vaccine (2 - Td) 11/19/2028    Flu vaccine  Completed    Hepatitis A vaccine  Aged Out    Hepatitis B vaccine  Aged Out    Hib vaccine  Aged Out    Meningococcal (ACWY) vaccine  Aged Out    Pneumococcal 0-64 years Vaccine  Aged Out       Subjective:      Review of Systems   Constitutional: Negative for chills, fatigue and fever. Respiratory: Negative for cough and shortness of breath. Cardiovascular: Negative for chest pain, palpitations and leg swelling. Gastrointestinal: Negative for constipation, diarrhea and nausea. Musculoskeletal: Negative for arthralgias and back pain. Skin: Negative for rash and wound. Neurological: Positive for weakness and numbness. Psychiatric/Behavioral: Negative for sleep disturbance. The patient is not nervous/anxious. Objective:     /82   Pulse 87   Temp 97.4 °F (36.3 °C)   Wt 210 lb 6.4 oz (95.4 kg)   SpO2 97%   BMI 36.12 kg/m²   Physical Exam  Nursing note reviewed. Constitutional:       Appearance: She is obese. HENT:      Head: Normocephalic and atraumatic. Cardiovascular:      Rate and Rhythm: Normal rate and regular rhythm. Heart sounds: Normal heart sounds. Pulmonary:      Effort: Pulmonary effort is normal.      Breath sounds: Normal breath sounds. Abdominal:      General: Bowel sounds are normal.      Palpations: Abdomen is soft. Skin:     General: Skin is warm and dry. Capillary Refill: Capillary refill takes less than 2 seconds. Neurological:      Mental Status: She is alert and oriented to person, place, and time. Cranial Nerves: No cranial nerve deficit. Gait: Gait normal.      Comments: Numbness with pressure on left axilla and left lateral epicondyle   Minor tenderness with palpation of left shoulder  No numbness produced in right axilla or elbow   Psychiatric:         Mood and Affect: Mood normal.         Thought Content:  Thought content normal.         Assessment: Diagnosis Orders   1. Neuralgia  predniSONE (DELTASONE) 20 MG tablet    MRI CERVICAL SPINE WO CONTRAST        Plan:    Prednisone taper dose  MRI of cervical spine    Return if symptoms worsen or fail to improve. Orders Placed This Encounter   Procedures    MRI CERVICAL SPINE WO CONTRAST     Standing Status:   Future     Standing Expiration Date:   2/26/2022     Order Specific Question:   Reason for exam:     Answer:   numbness     Orders Placed This Encounter   Medications    predniSONE (DELTASONE) 20 MG tablet     Sig: 3 tabs x 3 days, then 2 tabs x 3 days, then 1 tab x 3 days     Dispense:  18 tablet     Refill:  0       Patient given educationalmaterials - see patient instructions. Discussed use, benefit, and side effectsof prescribed medications. All patient questions answered. Pt voiced understanding. Reviewed health maintenance. Instructed to continue current medications, diet andexercise. Patient agreed with treatment plan. Follow up as directed.      Electronicallysigned by ROSEMARY Salter CNP on 2/26/2021 at 12:21 PM

## 2021-03-11 ENCOUNTER — HOSPITAL ENCOUNTER (OUTPATIENT)
Dept: MRI IMAGING | Age: 46
Discharge: HOME | End: 2021-03-13
Payer: COMMERCIAL

## 2021-03-11 ENCOUNTER — PATIENT MESSAGE (OUTPATIENT)
Dept: PRIMARY CARE CLINIC | Age: 46
End: 2021-03-11

## 2021-03-11 DIAGNOSIS — M79.2 NEURALGIA: ICD-10-CM

## 2021-03-11 PROCEDURE — 72141 MRI NECK SPINE W/O DYE: CPT

## 2021-03-12 DIAGNOSIS — M79.2 NEURALGIA: Primary | ICD-10-CM

## 2021-03-12 RX ORDER — GABAPENTIN 100 MG/1
100 CAPSULE ORAL 2 TIMES DAILY
Qty: 60 CAPSULE | Refills: 1 | Status: SHIPPED | OUTPATIENT
Start: 2021-03-12 | End: 2021-10-07 | Stop reason: ALTCHOICE

## 2021-03-24 RX ORDER — NARATRIPTAN 2.5 MG/1
2.5 TABLET ORAL
Qty: 9 TABLET | Refills: 2 | Status: SHIPPED | OUTPATIENT
Start: 2021-03-24 | End: 2021-07-19 | Stop reason: SDUPTHER

## 2021-03-25 ENCOUNTER — OFFICE VISIT (OUTPATIENT)
Dept: NEUROSURGERY | Age: 46
End: 2021-03-25
Payer: COMMERCIAL

## 2021-03-25 VITALS
OXYGEN SATURATION: 98 % | DIASTOLIC BLOOD PRESSURE: 84 MMHG | SYSTOLIC BLOOD PRESSURE: 134 MMHG | BODY MASS INDEX: 36.02 KG/M2 | WEIGHT: 211 LBS | TEMPERATURE: 98.1 F | RESPIRATION RATE: 18 BRPM | HEIGHT: 64 IN | HEART RATE: 89 BPM

## 2021-03-25 DIAGNOSIS — M54.12 CERVICAL RADICULOPATHY: Primary | ICD-10-CM

## 2021-03-25 DIAGNOSIS — M47.12 CERVICAL SPONDYLOSIS WITH MYELOPATHY: ICD-10-CM

## 2021-03-25 PROCEDURE — 99203 OFFICE O/P NEW LOW 30 MIN: CPT | Performed by: NEUROLOGICAL SURGERY

## 2021-03-25 NOTE — PROGRESS NOTES
Department of Neurosurgery                                                 New patient clinic note      Reason for Consult:    Requesting Physician:    Neurosurgeon: Brittni Cesar DO      History Obtained From:  patient    CHIEF COMPLAINT:         Chief Complaint   Patient presents with    Consultation     Neuralgia . MRI  C- 3/11 and XR C- TO       HISTORY OF PRESENT ILLNESS:       The patient is a 55 y.o. female who presents with   . numbnes radiating down anterior shoulder, arm, forearm to the 1st digit. Onset September. PAST MEDICAL HISTORY :       Past Medical History:        Diagnosis Date    Herpes labialis     Hypertension     Uterine leiomyoma        Past Surgical History:        Procedure Laterality Date     SECTION      HYSTERECTOMY  2018    With Removal of Submucous Leiomyomata    LAPAROSCOPY      Excis Endometriotic Tissue Uterosacral Ligaments       Social History:   Social History     Socioeconomic History    Marital status:      Spouse name: Not on file    Number of children: Not on file    Years of education: Not on file    Highest education level: Not on file   Occupational History    Not on file   Social Needs    Financial resource strain: Not hard at all   [address] insecurity     Worry: Never true     Inability: Never true   [employer] needs     Medical: No     Non-medical: No   Tobacco Use    Smoking status: Never Smoker    Smokeless tobacco: Never Used   Substance and Sexual Activity    Alcohol use:  Yes     Alcohol/week: 0.0 standard drinks    Drug use: No    Sexual activity: Not on file   Lifestyle    Physical activity     Days per week: Not on file     Minutes per session: Not on file    Stress: Not on file   Relationships    Social connections     Talks on phone: Not on file     Gets together: Not on file     Attends Alevism service: Not on file     Active member of club or organization: Not on file     Attends meetings of clubs or organizations: Not on file     Relationship status: Not on file    Intimate partner violence     Fear of current or ex partner: Not on file     Emotionally abused: Not on file     Physically abused: Not on file     Forced sexual activity: Not on file   Other Topics Concern    Not on file   Social History Narrative    Not on file       Family History:       Problem Relation Age of Onset    High Blood Pressure Mother     Depression Mother     Other Mother         Cluster Headaches; Fibromyalgia    Cancer Maternal Grandmother         breast    Heart Disease Maternal Grandfather     Cancer Paternal Grandmother         breast    Stroke Paternal Grandmother     Arthritis Paternal Grandmother     Cancer Paternal Grandfather         lung    Other Father         Alcoholism    Other Sister         Ovarian Neoplasm       Allergies:  Patient has no known allergies. Home Medications:  Prior to Admission medications    Medication Sig Start Date End Date Taking? Authorizing Provider   gabapentin (NEURONTIN) 100 MG capsule Take 1 capsule by mouth 2 times daily for 180 days.  3/12/21 9/8/21 Yes ROSEMARY Burch CNP   omeprazole (PRILOSEC) 20 MG delayed release capsule Take 1 capsule by mouth every morning (before breakfast) 2/22/21  Yes Darlyn Dyer PA-C   lisinopril (PRINIVIL;ZESTRIL) 10 MG tablet Take 1 tablet by mouth daily 2/22/21  Yes Darlyn Dyer PA-C   hydroCHLOROthiazide (HYDRODIURIL) 25 MG tablet TAKE 1 TABLET BY MOUTH IN THE MORNING 12/14/20  Yes Darlyn Dyer PA-C   ibuprofen (ADVIL;MOTRIN) 600 MG tablet Take 1 tablet by mouth every 6-8 hours as needed 7/28/19  Yes Historical Provider, MD   Multiple Vitamin (MULTI-DAY) TABS Take 1 tablet by mouth daily   Yes Darlyn Dyer PA-C   naratriptan (AMERGE) 2.5 MG tablet Take 1 tablet by mouth once as needed for Migraine 2.5 mg at onset of headache, may repeat in 4 hours if needed 3/24/21 3/24/21  Darlyn Dyer DAVIDE       Current Medications:   No current facility-administered medications for this visit. REVIEW OF SYSTEMS:       CONSTITUTIONAL: negative for fatigue and malaise   EYES: negative for double vision and photophobia    HEENT: negative for tinnitus and sore throat   RESPIRATORY: negative for cough, shortness of breath   CARDIOVASCULAR: negative for chest pain, palpitations   GASTROINTESTINAL: negative for nausea, vomiting   GENITOURINARY: negative for incontinence   MUSCULOSKELETAL: negative for neck or back pain   NEUROLOGICAL: negative for seizures   PSYCHIATRIC: negative for agitated     Review of systems otherwise negative.     PHYSICAL EXAM:       /84   Pulse 89   Temp 98.1 °F (36.7 °C)   Resp 18   Ht 5' 4\" (1.626 m)   Wt 211 lb (95.7 kg)   SpO2 98%   BMI 36.22 kg/m²     Gen: NAD, comfortable  HEENT: moist mucus membranes  Cardio: RRR  Pulm: chest rise symmetrically  GI: abd soft  Ext: no edema  Skin: warm    Neuro:    AOX3  CN 2-12 grossly intact  Speech articulate  Motor 5/5  Sensation symmetrical   No herrmann or clonus  spurling neg  DTR 2+  LABS AND IMAGING:     CBC with Differential:    Lab Results   Component Value Date    WBC 7.0 06/18/2020    RBC 4.90 06/18/2020    HGB 14.5 06/18/2020    HCT 42.1 06/18/2020     06/18/2020    MCV 85.9 06/18/2020    MCH 29.5 06/18/2020    MCHC 34.4 06/18/2020    RDW 13.1 06/18/2020    LYMPHOPCT 24 06/18/2020    MONOPCT 6 06/18/2020    BASOPCT 1 06/18/2020    MONOSABS 0.40 06/18/2020    LYMPHSABS 1.70 06/18/2020    EOSABS 0.10 06/18/2020    BASOSABS 0.00 06/18/2020    DIFFTYPE NOT REPORTED 06/18/2020     BMP:    Lab Results   Component Value Date     12/15/2020    K 4.4 12/15/2020     12/15/2020    CO2 23 12/15/2020    BUN 15 12/15/2020    CREATININE 0.69 12/15/2020    CALCIUM 9.6 12/15/2020    GFRAA >60 12/15/2020    LABGLOM >60 12/15/2020    GLUCOSE 147 06/18/2020       Radiology Review:        ASSESSMENT AND PLAN:       Patient Active Problem List   Diagnosis    Acne    Cold sore    Dermatofibroma    Dyslipidemia    Endometriosis    Fatigue    Hirsutism    IUD mechanical complication    Menorrhagia with irregular cycle    Menstrual migraine    Migraine headache    Mood swing    Neoplasm of uncertain behavior of skin of back    Ovarian cyst    Paronychia    Premenstrual dysphoric disorder    Skin tag    Uterine fibroid    Vaginitis    Vulvitis    Obesity    Essential hypertension    Tension headache    Neuralgia    Arthralgia of left temporomandibular joint    Chronic facial pain    Chest pain    Dense breast tissue on mammogram    S/P NIRMALA (total abdominal hysterectomy)         A/P:  This is a 55 y.o. female with       I showed the patient the imagings and explained the diagnosis and the various treatment options      Adriel Orozco DO     3/25/2021  9:38 AM    Tierra Day DO  Staff Neurosurgeon    This note was created using voice recognition software. There may be inaccuracies of transcription  that are inadvertently overlooked prior to the signature. There is any questions about the transcription please contact me.

## 2021-04-12 RX ORDER — HYDROCHLOROTHIAZIDE 25 MG/1
TABLET ORAL
Qty: 30 TABLET | Refills: 2 | Status: SHIPPED | OUTPATIENT
Start: 2021-04-12 | End: 2021-06-18 | Stop reason: ALTCHOICE

## 2021-04-12 RX ORDER — BUSPIRONE HYDROCHLORIDE 5 MG/1
5 TABLET ORAL 2 TIMES DAILY PRN
Qty: 60 TABLET | Refills: 0 | Status: SHIPPED | OUTPATIENT
Start: 2021-04-12 | End: 2021-05-11 | Stop reason: DRUGHIGH

## 2021-05-11 ENCOUNTER — OFFICE VISIT (OUTPATIENT)
Dept: PRIMARY CARE CLINIC | Age: 46
End: 2021-05-11
Payer: COMMERCIAL

## 2021-05-11 VITALS
WEIGHT: 206 LBS | SYSTOLIC BLOOD PRESSURE: 132 MMHG | OXYGEN SATURATION: 98 % | HEIGHT: 64 IN | DIASTOLIC BLOOD PRESSURE: 82 MMHG | BODY MASS INDEX: 35.17 KG/M2 | HEART RATE: 90 BPM

## 2021-05-11 DIAGNOSIS — F41.9 ANXIETY: Primary | ICD-10-CM

## 2021-05-11 DIAGNOSIS — F43.23 ADJUSTMENT DISORDER WITH MIXED ANXIETY AND DEPRESSED MOOD: ICD-10-CM

## 2021-05-11 DIAGNOSIS — I10 ESSENTIAL HYPERTENSION: ICD-10-CM

## 2021-05-11 PROCEDURE — 99214 OFFICE O/P EST MOD 30 MIN: CPT | Performed by: PHYSICIAN ASSISTANT

## 2021-05-11 RX ORDER — BUSPIRONE HYDROCHLORIDE 5 MG/1
TABLET ORAL
Qty: 120 TABLET | Refills: 0 | Status: SHIPPED | OUTPATIENT
Start: 2021-05-11 | End: 2021-06-18 | Stop reason: SDUPTHER

## 2021-05-11 RX ORDER — LISINOPRIL 10 MG/1
10 TABLET ORAL DAILY
Qty: 30 TABLET | Refills: 5 | Status: SHIPPED | OUTPATIENT
Start: 2021-05-11 | End: 2021-06-18 | Stop reason: ALTCHOICE

## 2021-05-11 ASSESSMENT — ENCOUNTER SYMPTOMS
SHORTNESS OF BREATH: 0
EYE REDNESS: 0
COUGH: 0
NAUSEA: 0
RHINORRHEA: 0
DIARRHEA: 0
ABDOMINAL PAIN: 0
VOMITING: 0
WHEEZING: 0
EYE DISCHARGE: 0
SORE THROAT: 0

## 2021-05-11 NOTE — PROGRESS NOTES
[facility]  [address] [place] [id number]  Dept: [phone]    Mary Rodriguez is a 55 y.o. female who presents today for her medical conditions/complaints as noted below. Chief Complaint   Patient presents with    Other     Patient very emotional, patient stated both of her parents are going to pass soon. She said the Buspar is not helping. HPI:     HPI  Using Buspar 5mg bid most days now. Dad is recovering from Covid and in a nursing home in rehab now. Mom had stroke in 2016 and recently had MI and dad does not know. Sister is ill   Brother is depressed and not helpful. There are no allen, no POA, she is not on accounts. Nannette Monique is mom and she is in palliative care.    LDL Cholesterol (mg/dL)   Date Value   12/15/2020 164 (H)   05/15/2019 154 (H)     LDL Calculated (mg/dL)   Date Value   2018 123   2017 180 (A)       (goal LDL is <100)   BUN (mg/dL)   Date Value   12/15/2020 15     BP Readings from Last 3 Encounters:   21 132/82   21 134/84   21 134/82          (goal 120/80)    Past Medical History:   Diagnosis Date    Herpes labialis     Hypertension     Uterine leiomyoma       Past Surgical History:   Procedure Laterality Date     SECTION      HYSTERECTOMY  2018    With Removal of Submucous Leiomyomata    LAPAROSCOPY      Excis Endometriotic Tissue Uterosacral Ligaments       Family History   Problem Relation Age of Onset    High Blood Pressure Mother     Depression Mother     Other Mother         Cluster Headaches; Fibromyalgia    Diabetes Mother     Stroke Mother     Coronary Art Dis Mother     Cancer Maternal Grandmother         breast    Heart Disease Maternal Grandfather     Cancer Paternal Grandmother         breast    Stroke Paternal Grandmother     Arthritis Paternal Grandmother     Cancer Paternal Grandfather         lung    Other Father Alcoholism    Other Sister         Ovarian Neoplasm       Social History     Tobacco Use    Smoking status: Never Smoker    Smokeless tobacco: Never Used   Substance Use Topics    Alcohol use: Yes     Alcohol/week: 0.0 standard drinks      Current Outpatient Medications   Medication Sig Dispense Refill    busPIRone (BUSPAR) 5 MG tablet Take 1 or 2 tablets tid  tablet 0    lisinopril (PRINIVIL;ZESTRIL) 10 MG tablet Take 1 tablet by mouth daily 30 tablet 5    hydroCHLOROthiazide (HYDRODIURIL) 25 MG tablet TAKE 1 TABLET BY MOUTH IN THE MORNING 30 tablet 2    gabapentin (NEURONTIN) 100 MG capsule Take 1 capsule by mouth 2 times daily for 180 days. 60 capsule 1    omeprazole (PRILOSEC) 20 MG delayed release capsule Take 1 capsule by mouth every morning (before breakfast) 30 capsule 5    ibuprofen (ADVIL;MOTRIN) 600 MG tablet Take 1 tablet by mouth every 6-8 hours as needed      Multiple Vitamin (MULTI-DAY) TABS Take 1 tablet by mouth daily      naratriptan (AMERGE) 2.5 MG tablet Take 1 tablet by mouth once as needed for Migraine 2.5 mg at onset of headache, may repeat in 4 hours if needed 9 tablet 2     No current facility-administered medications for this visit. No Known Allergies    Health Maintenance   Topic Date Due    Hepatitis C screen  Never done    HIV screen  Never done    Potassium monitoring  12/15/2021    Creatinine monitoring  12/15/2021    Diabetes screen  12/15/2023    Lipid screen  12/15/2025    DTaP/Tdap/Td vaccine (2 - Td) 11/19/2028    Flu vaccine  Completed    COVID-19 Vaccine  Completed    Hepatitis A vaccine  Aged Out    Hepatitis B vaccine  Aged Out    Hib vaccine  Aged Out    Meningococcal (ACWY) vaccine  Aged Out    Pneumococcal 0-64 years Vaccine  Aged Out       Subjective:      Review of Systems   Constitutional: Negative for chills and fever. HENT: Negative for rhinorrhea and sore throat. Eyes: Negative for discharge and redness.    Respiratory: Negative for cough, shortness of breath and wheezing. Cardiovascular: Negative for chest pain and palpitations. Gastrointestinal: Negative for abdominal pain, diarrhea, nausea and vomiting. Genitourinary: Negative for dysuria and frequency. Musculoskeletal: Negative for arthralgias and myalgias. Neurological: Negative for dizziness, light-headedness and headaches. Psychiatric/Behavioral: Positive for dysphoric mood and sleep disturbance. The patient is nervous/anxious. Objective:     /82   Pulse 90   Ht 5' 4\" (1.626 m)   Wt 206 lb (93.4 kg)   SpO2 98%   BMI 35.36 kg/m²   Physical Exam  Vitals signs and nursing note reviewed. Constitutional:       General: She is not in acute distress. Comments: Lost 5 pounds     Cardiovascular:      Rate and Rhythm: Normal rate and regular rhythm. Heart sounds: Normal heart sounds. Pulmonary:      Effort: Pulmonary effort is normal.      Breath sounds: Normal breath sounds. Musculoskeletal:      Right lower leg: No edema. Left lower leg: No edema. Neurological:      Mental Status: She is alert and oriented to person, place, and time. Psychiatric:         Attention and Perception: Attention normal.         Mood and Affect: Mood is anxious and depressed. Speech: Speech normal.         Behavior: Behavior normal.         Thought Content: Thought content normal.         Cognition and Memory: Cognition normal.         Judgment: Judgment normal.         Assessment:       Diagnosis Orders   1. Anxiety     2. Essential hypertension  lisinopril (PRINIVIL;ZESTRIL) 10 MG tablet   3. Adjustment disorder with mixed anxiety and depressed mood          Plan:    Increase the Buspar to 10mg tid PRN  Consider therapy  REfilled Lisinopril  Talk to the social workers about her parents situation to get more help. Return if symptoms worsen or fail to improve. No orders of the defined types were placed in this encounter.     Orders Placed This Encounter   Medications    busPIRone (BUSPAR) 5 MG tablet     Sig: Take 1 or 2 tablets tid PRN     Dispense:  120 tablet     Refill:  0    lisinopril (PRINIVIL;ZESTRIL) 10 MG tablet     Sig: Take 1 tablet by mouth daily     Dispense:  30 tablet     Refill:  5       Patient given educational materials - see patient instructions. Discussed use, benefit, and side effects of prescribed medications. All patient questions answered. Pt voiced understanding. Reviewed health maintenance. Instructed to continue current medications, diet and exercise. Patient agreed with treatment plan. Follow up as directed.      Electronically signed by Preet Mckeon PA-C on 5/11/2021 at 4:03 PM

## 2021-06-18 ENCOUNTER — OFFICE VISIT (OUTPATIENT)
Dept: PRIMARY CARE CLINIC | Age: 46
End: 2021-06-18
Payer: COMMERCIAL

## 2021-06-18 VITALS
DIASTOLIC BLOOD PRESSURE: 86 MMHG | OXYGEN SATURATION: 98 % | SYSTOLIC BLOOD PRESSURE: 134 MMHG | HEIGHT: 64 IN | WEIGHT: 209 LBS | BODY MASS INDEX: 35.68 KG/M2 | HEART RATE: 84 BPM

## 2021-06-18 DIAGNOSIS — F43.23 ADJUSTMENT DISORDER WITH MIXED ANXIETY AND DEPRESSED MOOD: ICD-10-CM

## 2021-06-18 DIAGNOSIS — E66.9 OBESITY (BMI 35.0-39.9 WITHOUT COMORBIDITY): ICD-10-CM

## 2021-06-18 DIAGNOSIS — H92.02 LEFT EAR PAIN: ICD-10-CM

## 2021-06-18 DIAGNOSIS — I10 ESSENTIAL HYPERTENSION: Primary | ICD-10-CM

## 2021-06-18 PROCEDURE — 99214 OFFICE O/P EST MOD 30 MIN: CPT | Performed by: PHYSICIAN ASSISTANT

## 2021-06-18 RX ORDER — NALTREXONE HYDROCHLORIDE AND BUPROPION HYDROCHLORIDE 8; 90 MG/1; MG/1
2 TABLET, EXTENDED RELEASE ORAL 2 TIMES DAILY
Qty: 120 TABLET | Refills: 0 | Status: SHIPPED | OUTPATIENT
Start: 2021-06-18 | End: 2021-07-19

## 2021-06-18 RX ORDER — LISINOPRIL AND HYDROCHLOROTHIAZIDE 20; 12.5 MG/1; MG/1
1 TABLET ORAL DAILY
Qty: 90 TABLET | Refills: 1 | Status: SHIPPED | OUTPATIENT
Start: 2021-06-18 | End: 2021-12-07

## 2021-06-18 RX ORDER — NALTREXONE HYDROCHLORIDE AND BUPROPION HYDROCHLORIDE 8; 90 MG/1; MG/1
2 TABLET, EXTENDED RELEASE ORAL 2 TIMES DAILY
Qty: 70 TABLET | Refills: 0 | Status: SHIPPED | OUTPATIENT
Start: 2021-06-18 | End: 2021-07-19

## 2021-06-18 RX ORDER — BUSPIRONE HYDROCHLORIDE 5 MG/1
TABLET ORAL
Qty: 270 TABLET | Refills: 1 | Status: SHIPPED | OUTPATIENT
Start: 2021-06-18 | End: 2022-05-05 | Stop reason: SDUPTHER

## 2021-06-18 ASSESSMENT — ENCOUNTER SYMPTOMS
COUGH: 0
COLOR CHANGE: 0
CHEST TIGHTNESS: 0
SHORTNESS OF BREATH: 0
APNEA: 0
ABDOMINAL PAIN: 0

## 2021-06-18 NOTE — PROGRESS NOTES
[address] [place] [address] [facility]IMARY C[facility]  [address] [place] [id number]  Dept: [phone]    Rosio Randolph is a 55 y.o. female who presents today for her medical conditions/complaints as noted below. Chief Complaint   Patient presents with    Hypertension     Pt said she has no concerns with her BP, patient said her readings are around 130's/80's       HPI:     HPI  Mom is at home on hospice  Dad is out of skilled and home too  She is still doing all the helping with parents but the Buspar helps  Buspar bid 1 in am and 2 in pm ---asks about Wellbutrin because a co worker lost weight on it.     Left ear is uncomfortable and \"feels full\"    LDL Cholesterol (mg/dL)   Date Value   12/15/2020 164 (H)   05/15/2019 154 (H)     LDL Calculated (mg/dL)   Date Value   2018 123   2017 180 (A)       (goal LDL is <100)   BUN (mg/dL)   Date Value   12/15/2020 15     BP Readings from Last 3 Encounters:   21 134/86   21 132/82   21 134/84          (goal 120/80)    Past Medical History:   Diagnosis Date    Herpes labialis     Hypertension     Uterine leiomyoma       Past Surgical History:   Procedure Laterality Date     SECTION      HYSTERECTOMY  2018    With Removal of Submucous Leiomyomata    LAPAROSCOPY      Excis Endometriotic Tissue Uterosacral Ligaments       Family History   Problem Relation Age of Onset    Heart Disease Mother     High Blood Pressure Mother     Depression Mother     Other Mother         Cluster Headaches; Fibromyalgia    Diabetes Mother     Stroke Mother     Coronary Art Dis Mother     Other Father         Alcoholism    Other Sister         Ovarian Neoplasm    Cancer Maternal Grandmother         breast    Heart Disease Maternal Grandfather     Cancer Paternal Grandmother         breast    Stroke Paternal Grandmother     Arthritis Paternal Grandmother     Cancer Paternal Grandfather         lung       Social History     Tobacco Use    Smoking status: Never Smoker    Smokeless tobacco: Never Used   Substance Use Topics    Alcohol use: Yes     Alcohol/week: 0.0 standard drinks      Current Outpatient Medications   Medication Sig Dispense Refill    lisinopril-hydroCHLOROthiazide (PRINZIDE;ZESTORETIC) 20-12.5 MG per tablet Take 1 tablet by mouth daily 90 tablet 1    naltrexone-buPROPion (CONTRAVE) 8-90 MG per extended release tablet Take 2 tablets by mouth 2 times daily Week 1: 1Tablet am  Week 2: 1 Tablet am and 1 tablet pm  Week 3: 2 tablets am and 1 tablet pm  Week 4: 2 tablets am and 2 tablets pm 70 tablet 0    naltrexone-buPROPion (CONTRAVE) 8-90 MG per extended release tablet Take 2 tablets by mouth 2 times daily 120 tablet 0    busPIRone (BUSPAR) 5 MG tablet Take 1 or 2 tablets tid  tablet 1    neomycin-polymyxin-hydrocortisone (CORTISPORIN) 3.5-00335-7 otic solution Place 4 drops into the left ear 3 times daily for 10 days Instill into left Ear 1 Bottle 0    naratriptan (AMERGE) 2.5 MG tablet Take 1 tablet by mouth once as needed for Migraine 2.5 mg at onset of headache, may repeat in 4 hours if needed 9 tablet 2    gabapentin (NEURONTIN) 100 MG capsule Take 1 capsule by mouth 2 times daily for 180 days. 60 capsule 1    ibuprofen (ADVIL;MOTRIN) 600 MG tablet Take 1 tablet by mouth every 6-8 hours as needed      Multiple Vitamin (MULTI-DAY) TABS Take 1 tablet by mouth daily       No current facility-administered medications for this visit.      No Known Allergies    Health Maintenance   Topic Date Due    Hepatitis C screen  Never done    HIV screen  Never done    Potassium monitoring  12/15/2021    Creatinine monitoring  12/15/2021    Diabetes screen  12/15/2023    Lipid screen  12/15/2025    DTaP/Tdap/Td vaccine (2 - Td or Tdap) 11/19/2028    Flu vaccine  Completed    COVID-19 Vaccine  Completed    Hepatitis A vaccine  Aged Out    Hepatitis B vaccine  Aged Out    Hib vaccine  Aged Out  Meningococcal (ACWY) vaccine  Aged Out    Pneumococcal 0-64 years Vaccine  Aged Out       Subjective:      Review of Systems   Constitutional: Negative for fatigue. HENT: Positive for ear pain (feels plugged). Respiratory: Negative for apnea, cough, chest tightness and shortness of breath. Cardiovascular: Negative for chest pain, palpitations and leg swelling. Gastrointestinal: Negative for abdominal pain. Skin: Negative for color change. Neurological: Negative for dizziness, syncope, weakness, light-headedness and headaches. Psychiatric/Behavioral: Negative for sleep disturbance. The patient is not nervous/anxious. Objective:     /86   Pulse 84   Ht 5' 4\" (1.626 m)   Wt 209 lb (94.8 kg)   SpO2 98%   BMI 35.87 kg/m²   Physical Exam  Vitals reviewed. HENT:      Right Ear: Tympanic membrane, ear canal and external ear normal.      Left Ear: Tympanic membrane and external ear normal. Tenderness present. No middle ear effusion. There is no impacted cerumen. Tympanic membrane is not injected, scarred, perforated, erythematous, retracted or bulging. Neck:      Vascular: No carotid bruit. Cardiovascular:      Rate and Rhythm: Normal rate and regular rhythm. Heart sounds: Normal heart sounds. No murmur heard. No friction rub. No gallop. Pulmonary:      Effort: Pulmonary effort is normal.      Breath sounds: Normal breath sounds. Musculoskeletal:      Cervical back: No edema. Neurological:      Mental Status: She is alert and oriented to person, place, and time. Assessment:       Diagnosis Orders   1. Essential hypertension     2. Adjustment disorder with mixed anxiety and depressed mood     3. Left ear pain     4. Obesity (BMI 35.0-39.9 without comorbidity)          Plan:    Changed HTN med to Lisinopril/HCTZ 20/12.5 once daily  Work on diet and Exercise  Start Contrave  Continue Buspar   Drop for ear  Return in about 2 months (around 8/18/2021) for Anxiety. No orders of the defined types were placed in this encounter. Orders Placed This Encounter   Medications    lisinopril-hydroCHLOROthiazide (PRINZIDE;ZESTORETIC) 20-12.5 MG per tablet     Sig: Take 1 tablet by mouth daily     Dispense:  90 tablet     Refill:  1    naltrexone-buPROPion (CONTRAVE) 8-90 MG per extended release tablet     Sig: Take 2 tablets by mouth 2 times daily Week 1: 1Tablet am  Week 2: 1 Tablet am and 1 tablet pm  Week 3: 2 tablets am and 1 tablet pm  Week 4: 2 tablets am and 2 tablets pm     Dispense:  70 tablet     Refill:  0    naltrexone-buPROPion (CONTRAVE) 8-90 MG per extended release tablet     Sig: Take 2 tablets by mouth 2 times daily     Dispense:  120 tablet     Refill:  0    busPIRone (BUSPAR) 5 MG tablet     Sig: Take 1 or 2 tablets tid PRN     Dispense:  270 tablet     Refill:  1    neomycin-polymyxin-hydrocortisone (CORTISPORIN) 3.5-04314-7 otic solution     Sig: Place 4 drops into the left ear 3 times daily for 10 days Instill into left Ear     Dispense:  1 Bottle     Refill:  0       Patient given educational materials - see patient instructions. Discussed use, benefit, and side effects of prescribed medications. All patient questions answered. Pt voiced understanding. Reviewed health maintenance. Instructed to continue current medications, diet and exercise. Patient agreed with treatment plan. Follow up as directed.      Electronically signed by Taran Le PA-C on 6/20/2021 at 8:54 PM

## 2021-07-19 ENCOUNTER — OFFICE VISIT (OUTPATIENT)
Dept: PRIMARY CARE CLINIC | Age: 46
End: 2021-07-19
Payer: COMMERCIAL

## 2021-07-19 VITALS
OXYGEN SATURATION: 98 % | BODY MASS INDEX: 34.83 KG/M2 | WEIGHT: 204 LBS | HEART RATE: 84 BPM | SYSTOLIC BLOOD PRESSURE: 132 MMHG | DIASTOLIC BLOOD PRESSURE: 84 MMHG | HEIGHT: 64 IN

## 2021-07-19 DIAGNOSIS — R07.89 CHEST TIGHTNESS: ICD-10-CM

## 2021-07-19 DIAGNOSIS — E78.5 DYSLIPIDEMIA: ICD-10-CM

## 2021-07-19 DIAGNOSIS — G43.829 MENSTRUAL MIGRAINE WITHOUT STATUS MIGRAINOSUS, NOT INTRACTABLE: ICD-10-CM

## 2021-07-19 DIAGNOSIS — F43.23 ADJUSTMENT DISORDER WITH MIXED ANXIETY AND DEPRESSED MOOD: ICD-10-CM

## 2021-07-19 DIAGNOSIS — I10 ESSENTIAL HYPERTENSION: Primary | ICD-10-CM

## 2021-07-19 PROCEDURE — 99214 OFFICE O/P EST MOD 30 MIN: CPT | Performed by: PHYSICIAN ASSISTANT

## 2021-07-19 PROCEDURE — 93000 ELECTROCARDIOGRAM COMPLETE: CPT | Performed by: PHYSICIAN ASSISTANT

## 2021-07-19 RX ORDER — NALTREXONE HYDROCHLORIDE AND BUPROPION HYDROCHLORIDE 8; 90 MG/1; MG/1
2 TABLET, EXTENDED RELEASE ORAL 2 TIMES DAILY
Qty: 70 TABLET | Refills: 0 | Status: CANCELLED | OUTPATIENT
Start: 2021-07-19

## 2021-07-19 RX ORDER — OMEPRAZOLE 40 MG/1
40 CAPSULE, DELAYED RELEASE ORAL
Qty: 30 CAPSULE | Refills: 5 | Status: SHIPPED | OUTPATIENT
Start: 2021-07-19

## 2021-07-19 RX ORDER — NARATRIPTAN 2.5 MG/1
2.5 TABLET ORAL
Qty: 9 TABLET | Refills: 2 | Status: SHIPPED | OUTPATIENT
Start: 2021-07-19 | End: 2021-10-07 | Stop reason: SDUPTHER

## 2021-07-19 ASSESSMENT — ENCOUNTER SYMPTOMS
COUGH: 0
NAUSEA: 0
VOMITING: 1
COLOR CHANGE: 0
ABDOMINAL PAIN: 0
SHORTNESS OF BREATH: 0
CHEST TIGHTNESS: 1
CONSTIPATION: 1
APNEA: 0
BLOOD IN STOOL: 0
DIARRHEA: 0
ABDOMINAL DISTENTION: 0

## 2021-07-19 NOTE — PROGRESS NOTES
[facility] PRIMARY CARE  [address] [place] [id number]  Dept: [phone]    Cat Storey is a 55 y.o. female who presents today for her medical conditions/complaints as noted below. Chief Complaint   Patient presents with    Medication Check     Patient is taking Contrave, patient said she is under alot of stress and unsure if the hest tightness is from stress or medication,       HPI:     HPI  Last appt changed BP med and BPs in 130s /80s    Started Contrave and is having chest tightness---HR sometimes  is elevated since on it. Taking her Buspar 1 in am and 2 in later afternoon and this helps her anxiety a lot    Had to take a 5 days respite from her mom due to mean comments she made. Had been 59 days straight taking care of her.     LDL Cholesterol (mg/dL)   Date Value   12/15/2020 164 (H)   05/15/2019 154 (H)     LDL Calculated (mg/dL)   Date Value   2018 123   2017 180 (A)       (goal LDL is <100)   BUN (mg/dL)   Date Value   12/15/2020 15     BP Readings from Last 3 Encounters:   21 132/84   21 134/86   21 132/82          (goal 120/80)    Past Medical History:   Diagnosis Date    Herpes labialis     Hypertension     Uterine leiomyoma       Past Surgical History:   Procedure Laterality Date     SECTION      HYSTERECTOMY  2018    With Removal of Submucous Leiomyomata    LAPAROSCOPY      Excis Endometriotic Tissue Uterosacral Ligaments       Family History   Problem Relation Age of Onset    Heart Disease Mother     High Blood Pressure Mother     Depression Mother     Other Mother         Cluster Headaches; Fibromyalgia    Diabetes Mother     Stroke Mother     Coronary Art Dis Mother     Other Father         Alcoholism    Other Sister         Ovarian Neoplasm    Cancer Maternal Grandmother         breast    Heart Disease Maternal Grandfather     Cancer Paternal Grandmother         breast    Stroke Paternal Grandmother     Arthritis Paternal Grandmother     Cancer Paternal Grandfather         lung       Social History     Tobacco Use    Smoking status: Never Smoker    Smokeless tobacco: Never Used   Substance Use Topics    Alcohol use: Yes     Alcohol/week: 0.0 standard drinks      Current Outpatient Medications   Medication Sig Dispense Refill    naratriptan (AMERGE) 2.5 MG tablet Take 1 tablet by mouth once as needed for Migraine 2.5 mg at onset of headache, may repeat in 4 hours if needed 9 tablet 2    omeprazole (PRILOSEC) 40 MG delayed release capsule Take 1 capsule by mouth every morning (before breakfast) 30 capsule 5    lisinopril-hydroCHLOROthiazide (PRINZIDE;ZESTORETIC) 20-12.5 MG per tablet Take 1 tablet by mouth daily 90 tablet 1    busPIRone (BUSPAR) 5 MG tablet Take 1 or 2 tablets tid  tablet 1    gabapentin (NEURONTIN) 100 MG capsule Take 1 capsule by mouth 2 times daily for 180 days. 60 capsule 1    Multiple Vitamin (MULTI-DAY) TABS Take 1 tablet by mouth daily       No current facility-administered medications for this visit. No Known Allergies    Health Maintenance   Topic Date Due    Hepatitis C screen  Never done    HIV screen  Never done    Colon cancer screen colonoscopy  Never done    Flu vaccine (1) 09/01/2021    Potassium monitoring  12/15/2021    Creatinine monitoring  12/15/2021    Diabetes screen  12/15/2023    Lipid screen  12/15/2025    DTaP/Tdap/Td vaccine (2 - Td or Tdap) 11/19/2028    COVID-19 Vaccine  Completed    Hepatitis A vaccine  Aged Out    Hepatitis B vaccine  Aged Out    Hib vaccine  Aged Out    Meningococcal (ACWY) vaccine  Aged Out    Pneumococcal 0-64 years Vaccine  Aged Out       Subjective:      Review of Systems   Constitutional: Positive for fatigue. Negative for diaphoresis. Respiratory: Positive for chest tightness. Negative for apnea, cough and shortness of breath.     Cardiovascular: Negative for chest pain, palpitations and leg swelling. Gastrointestinal: Positive for constipation (\"always full of stool\") and vomiting (every am for years now--bile, no blood ). Negative for abdominal distention, abdominal pain, blood in stool (no melena), diarrhea and nausea. Belching   Musculoskeletal: Positive for arthralgias (left shoulder). Skin: Negative for color change. Neurological: Positive for dizziness. Negative for syncope, weakness, light-headedness and headaches. Psychiatric/Behavioral: Negative for sleep disturbance. The patient is not nervous/anxious. Objective:     /84   Pulse 84   Ht 5' 4\" (1.626 m)   Wt 204 lb (92.5 kg)   SpO2 98%   BMI 35.02 kg/m²   Physical Exam  Vitals and nursing note reviewed. Constitutional:       Appearance: Normal appearance. She is obese. She is not ill-appearing. Comments: Lost 5 pounds   Neck:      Vascular: No carotid bruit. Cardiovascular:      Rate and Rhythm: Normal rate and regular rhythm. Heart sounds: Normal heart sounds. No murmur heard. No friction rub. No gallop. Pulmonary:      Effort: Pulmonary effort is normal.      Breath sounds: Normal breath sounds. Abdominal:      Tenderness: There is abdominal tenderness (LLQ). Musculoskeletal:      Cervical back: No edema. Neurological:      Mental Status: She is alert and oriented to person, place, and time. Psychiatric:         Attention and Perception: Attention normal.         Mood and Affect: Mood normal.         Behavior: Behavior normal.         Thought Content: Thought content normal.         Cognition and Memory: Cognition normal.         Judgment: Judgment normal.         Assessment:       Diagnosis Orders   1. Essential hypertension  EKG 12 Lead    Lipid Panel    Comprehensive Metabolic Panel, Fasting   2. Adjustment disorder with mixed anxiety and depressed mood     3. Chest tightness  EKG 12 Lead    Lipid Panel    Comprehensive Metabolic Panel, Fasting   4.  Menstrual

## 2021-07-30 ENCOUNTER — HOSPITAL ENCOUNTER (OUTPATIENT)
Age: 46
Setting detail: SPECIMEN
Discharge: HOME | End: 2021-07-30
Payer: COMMERCIAL

## 2021-07-30 DIAGNOSIS — R07.89 CHEST TIGHTNESS: ICD-10-CM

## 2021-07-30 DIAGNOSIS — E78.5 DYSLIPIDEMIA: ICD-10-CM

## 2021-07-30 DIAGNOSIS — I10 ESSENTIAL HYPERTENSION: ICD-10-CM

## 2021-07-30 LAB
ALBUMIN SERPL-MCNC: 4 G/DL (ref 3.5–5.2)
ALBUMIN/GLOBULIN RATIO: 1.6 (ref 1–2.5)
ALP BLD-CCNC: 76 U/L (ref 35–104)
ALT SERPL-CCNC: 21 U/L (ref 5–33)
ANION GAP SERPL CALCULATED.3IONS-SCNC: 12 MMOL/L (ref 9–17)
AST SERPL-CCNC: 17 U/L
BILIRUB SERPL-MCNC: 0.28 MG/DL (ref 0.3–1.2)
BUN BLDV-MCNC: 14 MG/DL (ref 6–20)
BUN/CREAT BLD: ABNORMAL (ref 9–20)
CALCIUM SERPL-MCNC: 8.9 MG/DL (ref 8.6–10.4)
CHLORIDE BLD-SCNC: 103 MMOL/L (ref 98–107)
CHOLESTEROL/HDL RATIO: 5.7
CHOLESTEROL: 217 MG/DL
CO2: 23 MMOL/L (ref 20–31)
CREAT SERPL-MCNC: 0.82 MG/DL (ref 0.5–0.9)
GFR AFRICAN AMERICAN: >60 ML/MIN
GFR NON-AFRICAN AMERICAN: >60 ML/MIN
GFR SERPL CREATININE-BSD FRML MDRD: ABNORMAL ML/MIN/{1.73_M2}
GFR SERPL CREATININE-BSD FRML MDRD: ABNORMAL ML/MIN/{1.73_M2}
GLUCOSE FASTING: 106 MG/DL (ref 70–99)
HDLC SERPL-MCNC: 38 MG/DL
LDL CHOLESTEROL: 138 MG/DL (ref 0–130)
POTASSIUM SERPL-SCNC: 4.3 MMOL/L (ref 3.7–5.3)
SODIUM BLD-SCNC: 138 MMOL/L (ref 135–144)
TOTAL PROTEIN: 6.5 G/DL (ref 6.4–8.3)
TRIGL SERPL-MCNC: 205 MG/DL
VLDLC SERPL CALC-MCNC: ABNORMAL MG/DL (ref 1–30)

## 2021-08-02 NOTE — RESULT ENCOUNTER NOTE
MEGHAN Deshpande,     Your cholesterol is a bit high, but let's hold off on medication at this time and try diet (low fat, high fiber) and exercise. You have a lot going on right now so let's recheck it in 6 months, if you are comfortable with that?

## 2021-10-07 ENCOUNTER — OFFICE VISIT (OUTPATIENT)
Dept: PRIMARY CARE CLINIC | Age: 46
End: 2021-10-07
Payer: COMMERCIAL

## 2021-10-07 VITALS
HEART RATE: 73 BPM | DIASTOLIC BLOOD PRESSURE: 80 MMHG | BODY MASS INDEX: 35.51 KG/M2 | WEIGHT: 208 LBS | OXYGEN SATURATION: 97 % | HEIGHT: 64 IN | SYSTOLIC BLOOD PRESSURE: 132 MMHG

## 2021-10-07 DIAGNOSIS — Z00.00 HEALTH CARE MAINTENANCE: Primary | ICD-10-CM

## 2021-10-07 DIAGNOSIS — E66.9 OBESITY (BMI 35.0-39.9 WITHOUT COMORBIDITY): ICD-10-CM

## 2021-10-07 DIAGNOSIS — Z12.11 COLON CANCER SCREENING: ICD-10-CM

## 2021-10-07 DIAGNOSIS — I10 ESSENTIAL HYPERTENSION: ICD-10-CM

## 2021-10-07 DIAGNOSIS — G43.829 MENSTRUAL MIGRAINE WITHOUT STATUS MIGRAINOSUS, NOT INTRACTABLE: ICD-10-CM

## 2021-10-07 DIAGNOSIS — L91.8 CUTANEOUS SKIN TAGS: ICD-10-CM

## 2021-10-07 DIAGNOSIS — Z23 NEED FOR VACCINATION: ICD-10-CM

## 2021-10-07 DIAGNOSIS — L70.0 OPEN COMEDONE: ICD-10-CM

## 2021-10-07 DIAGNOSIS — F43.23 ADJUSTMENT DISORDER WITH MIXED ANXIETY AND DEPRESSED MOOD: ICD-10-CM

## 2021-10-07 PROCEDURE — 99396 PREV VISIT EST AGE 40-64: CPT | Performed by: PHYSICIAN ASSISTANT

## 2021-10-07 PROCEDURE — 90674 CCIIV4 VAC NO PRSV 0.5 ML IM: CPT | Performed by: PHYSICIAN ASSISTANT

## 2021-10-07 PROCEDURE — 90471 IMMUNIZATION ADMIN: CPT | Performed by: PHYSICIAN ASSISTANT

## 2021-10-07 RX ORDER — NALTREXONE HYDROCHLORIDE AND BUPROPION HYDROCHLORIDE 8; 90 MG/1; MG/1
2 TABLET, EXTENDED RELEASE ORAL 2 TIMES DAILY
Qty: 70 TABLET | Refills: 0 | Status: SHIPPED | OUTPATIENT
Start: 2021-10-07 | End: 2022-05-20

## 2021-10-07 RX ORDER — NARATRIPTAN 2.5 MG/1
2.5 TABLET ORAL
Qty: 9 TABLET | Refills: 2 | Status: SHIPPED | OUTPATIENT
Start: 2021-10-07 | End: 2022-09-06 | Stop reason: SDUPTHER

## 2021-10-07 RX ORDER — NALTREXONE HYDROCHLORIDE AND BUPROPION HYDROCHLORIDE 8; 90 MG/1; MG/1
2 TABLET, EXTENDED RELEASE ORAL 2 TIMES DAILY
Qty: 120 TABLET | Refills: 0 | Status: SHIPPED | OUTPATIENT
Start: 2021-10-07 | End: 2022-05-20

## 2021-10-07 ASSESSMENT — ENCOUNTER SYMPTOMS
EYE ITCHING: 0
TROUBLE SWALLOWING: 0
VOICE CHANGE: 0
EYE PAIN: 0
CHEST TIGHTNESS: 0
VOMITING: 0
SHORTNESS OF BREATH: 0
BACK PAIN: 0
CONSTIPATION: 0
NAUSEA: 0
BLOOD IN STOOL: 0
ABDOMINAL PAIN: 0
COUGH: 0
DIARRHEA: 0

## 2021-10-07 NOTE — PROGRESS NOTES
[facility] Primary Care  [address] [place] [address]  Phone: [phone]  Fax: [phone]    Victor Hugo Zurita is a 55 y.o. female who presents today for her medical conditions/complaintsas noted below. Victor Hugo Zurita is c/o of Annual Exam (patient has form she needs filled out for insuance.) and Mole (patient has a mold on her back she wants looked at, patient said it itches and bleeds.)      HPI:   Mom  in August and dad is at home alone now and she has to go over there every day. TAking Buspar as needed. Sleeping ok. Cholesterol labs from July are high with ratio of 5.7 and sugar is 106. Has not gained weight but exercise is lacking  Ready for her colon cancer screen      Past Medical History:   Diagnosis Date    Herpes labialis     Hypertension     Uterine leiomyoma       Past Surgical History:   Procedure Laterality Date     SECTION      HYSTERECTOMY  2018    With Removal of Submucous Leiomyomata    LAPAROSCOPY      Excis Endometriotic Tissue Uterosacral Ligaments     Family History   Problem Relation Age of Onset    Other Mother         Cluster Headaches; Fibromyalgia    Heart Disease Mother     High Blood Pressure Mother     Depression Mother     Diabetes Mother     Stroke Mother     Coronary Art Dis Mother     Other Father         Alcoholism, COPD    Other Sister         Ovarian Neoplasm    Cancer Maternal Grandmother         breast    Heart Disease Maternal Grandfather     Cancer Paternal Grandmother         breast    Stroke Paternal Grandmother     Arthritis Paternal Grandmother     Cancer Paternal Grandfather         lung     Social History     Tobacco Use    Smoking status: Never Smoker    Smokeless tobacco: Never Used   Substance Use Topics    Alcohol use:  Yes     Alcohol/week: 0.0 standard drinks      Current Outpatient Medications   Medication Sig Dispense Refill    naltrexone-buPROPion (CONTRAVE) 8-90 MG per extended release tablet Take 2 tablets by mouth 2 times daily Week 1: 1Tablet am  Week 2: 1 Tablet am and 1 tablet pm  Week 3: 2 tablets am and 1 tablet pm  Week 4: 2 tablets am and 2 tablets pm 70 tablet 0    naltrexone-buPROPion (CONTRAVE) 8-90 MG per extended release tablet Take 2 tablets by mouth 2 times daily 120 tablet 0    naratriptan (AMERGE) 2.5 MG tablet Take 1 tablet by mouth once as needed for Migraine 2.5 mg at onset of headache, may repeat in 4 hours if needed 9 tablet 2    omeprazole (PRILOSEC) 40 MG delayed release capsule Take 1 capsule by mouth every morning (before breakfast) 30 capsule 5    lisinopril-hydroCHLOROthiazide (PRINZIDE;ZESTORETIC) 20-12.5 MG per tablet Take 1 tablet by mouth daily 90 tablet 1    busPIRone (BUSPAR) 5 MG tablet Take 1 or 2 tablets tid  tablet 1    Multiple Vitamin (MULTI-DAY) TABS Take 1 tablet by mouth daily       No current facility-administered medications for this visit. No Known Allergies    Health Maintenance   Topic Date Due    Hepatitis C screen  Never done    HIV screen  Never done    Colon cancer screen colonoscopy  Never done    Potassium monitoring  07/30/2022    Creatinine monitoring  07/30/2022    Diabetes screen  07/30/2024    Lipid screen  07/30/2026    DTaP/Tdap/Td vaccine (2 - Td or Tdap) 11/19/2028    Flu vaccine  Completed    COVID-19 Vaccine  Completed    Hepatitis A vaccine  Aged Out    Hepatitis B vaccine  Aged Out    Hib vaccine  Aged Out    Meningococcal (ACWY) vaccine  Aged Out    Pneumococcal 0-64 years Vaccine  Aged Out       Subjective:      Review of Systems   Constitutional: Negative for activity change, appetite change, chills, fatigue, fever and unexpected weight change. HENT: Negative for dental problem, hearing loss, trouble swallowing and voice change. Eyes: Negative for pain, itching and visual disturbance. Respiratory: Negative for cough, chest tightness and shortness of breath.     Cardiovascular: Negative for chest pain, palpitations and leg swelling. Gastrointestinal: Negative for abdominal pain, blood in stool, constipation, diarrhea, nausea and vomiting. Endocrine: Negative for cold intolerance, heat intolerance, polydipsia, polyphagia and polyuria. Genitourinary: Negative for difficulty urinating, dysuria, flank pain, frequency, hematuria, menstrual problem, pelvic pain, urgency, vaginal bleeding, vaginal discharge and vaginal pain. Musculoskeletal: Negative for arthralgias, back pain and myalgias. Skin: Negative for rash. Neurological: Negative for dizziness, syncope, speech difficulty, light-headedness and headaches. Hematological: Does not bruise/bleed easily. Psychiatric/Behavioral: Negative for dysphoric mood and sleep disturbance. The patient is not nervous/anxious. Objective:     Vitals:    10/07/21 0953   BP: 132/80   Pulse: 73   SpO2: 97%   Weight: 208 lb (94.3 kg)   Height: 5' 4\" (1.626 m)     Physical Exam  Vitals reviewed. Constitutional:       General: She is not in acute distress. Appearance: She is well-developed. HENT:      Head: Normocephalic and atraumatic. Right Ear: External ear normal.      Left Ear: External ear normal.      Nose: Nose normal.      Mouth/Throat:      Pharynx: No oropharyngeal exudate. Eyes:      General: No scleral icterus. Right eye: No discharge. Left eye: No discharge. Conjunctiva/sclera: Conjunctivae normal.      Pupils: Pupils are equal, round, and reactive to light. Neck:      Thyroid: No thyromegaly. Cardiovascular:      Rate and Rhythm: Normal rate and regular rhythm. Heart sounds: Normal heart sounds. No murmur heard. No friction rub. No gallop. Pulmonary:      Effort: Pulmonary effort is normal.      Breath sounds: Normal breath sounds. No wheezing or rales. Abdominal:      General: Bowel sounds are normal. There is no distension. Palpations: Abdomen is soft. There is no mass. Tenderness: There is abdominal tenderness in the left lower quadrant. There is no guarding or rebound. Musculoskeletal:         General: No deformity. Normal range of motion. Cervical back: Normal range of motion. Lymphadenopathy:      Cervical: No cervical adenopathy. Skin:     General: Skin is warm. Capillary Refill: Capillary refill takes less than 2 seconds. Findings: No rash. Comments: Has two open comedone on back and several skin tags around neck and in axillae   Neurological:      Mental Status: She is alert and oriented to person, place, and time. Motor: No abnormal muscle tone. Coordination: Coordination normal.   Psychiatric:         Behavior: Behavior normal.         Thought Content: Thought content normal.         Judgment: Judgment normal.         Assessment:      Diagnosis Orders   1. Health care maintenance     2. Need for vaccination  INFLUENZA, MDCK QUADV, 2 YRS AND OLDER, IM, PF, PREFILL SYR OR SDV, 0.5ML (FLUCELVAX QUADV, PF)   3. Menstrual migraine without status migrainosus, not intractable  naratriptan (AMERGE) 2.5 MG tablet   4. Essential hypertension     5. Adjustment disorder with mixed anxiety and depressed mood     6. Colon cancer screening  Neela Hill MD, Colorectal Surgery, [place]   7. Obesity (BMI 35.0-39.9 without comorbidity)  naltrexone-buPROPion (CONTRAVE) 8-90 MG per extended release tablet    naltrexone-buPROPion (CONTRAVE) 8-90 MG per extended release tablet   8. Open comedone     9. Cutaneous skin tags           Plan:    Work on [employer] and [employer] again  Discussed colon cancer screening--referral given  REviewed BW  Flu vax  Discussed removal of comedones and skin tags    Return in about 2 months (around 12/7/2021) for Weight Management, HTN.     Orders Placed This Encounter   Procedures    INFLUENZA, MDCK QUADV, 2 YRS AND OLDER, IM, PF, PREFILL SYR OR SDV, 0.5ML (Jacob Rae, PF)    Neela Hill MD, Colorectal Surgery, [place]     Referral Priority:   Routine     Referral Type:   Eval and Treat     Referral Reason:   Specialty Services Required     Referred to Provider:   Hetal August MD     Requested Specialty:   Colon and Rectal Surgery     Number of Visits Requested:   1     Orders Placed This Encounter   Medications    naltrexone-buPROPion (CONTRAVE) 8-90 MG per extended release tablet     Sig: Take 2 tablets by mouth 2 times daily Week 1: 1Tablet am  Week 2: 1 Tablet am and 1 tablet pm  Week 3: 2 tablets am and 1 tablet pm  Week 4: 2 tablets am and 2 tablets pm     Dispense:  70 tablet     Refill:  0    naltrexone-buPROPion (CONTRAVE) 8-90 MG per extended release tablet     Sig: Take 2 tablets by mouth 2 times daily     Dispense:  120 tablet     Refill:  0    naratriptan (AMERGE) 2.5 MG tablet     Sig: Take 1 tablet by mouth once as needed for Migraine 2.5 mg at onset of headache, may repeat in 4 hours if needed     Dispense:  9 tablet     Refill:  2       Patient given educational materials - see patient instructions. Discussed use,benefit, and side effects of prescribed medications. All patient questions answered. Pt voiced understanding. Reviewed health maintenance. Instructed to continue currentmedications, healthy diet and regular, aerobic exercise.           Electronically signed by Julia Brewer PA-C on 10/7/2021 at 11:43 AM

## 2021-11-07 ENCOUNTER — APPOINTMENT (OUTPATIENT)
Dept: CT IMAGING | Age: 46
End: 2021-11-07
Payer: COMMERCIAL

## 2021-11-07 ENCOUNTER — HOSPITAL ENCOUNTER (EMERGENCY)
Age: 46
Discharge: HOME | End: 2021-11-07
Attending: EMERGENCY MEDICINE
Payer: COMMERCIAL

## 2021-11-07 VITALS
DIASTOLIC BLOOD PRESSURE: 66 MMHG | SYSTOLIC BLOOD PRESSURE: 116 MMHG | HEART RATE: 74 BPM | WEIGHT: 204 LBS | OXYGEN SATURATION: 98 % | TEMPERATURE: 98.4 F | RESPIRATION RATE: 16 BRPM | BODY MASS INDEX: 34.83 KG/M2 | HEIGHT: 64 IN

## 2021-11-07 DIAGNOSIS — R07.9 CHEST PAIN, UNSPECIFIED TYPE: ICD-10-CM

## 2021-11-07 DIAGNOSIS — M79.602 LEFT ARM PAIN: Primary | ICD-10-CM

## 2021-11-07 LAB
ABSOLUTE EOS #: 0.2 K/UL (ref 0–0.4)
ABSOLUTE IMMATURE GRANULOCYTE: NORMAL K/UL (ref 0–0.3)
ABSOLUTE LYMPH #: 1.5 K/UL (ref 1–4.8)
ABSOLUTE MONO #: 0.3 K/UL (ref 0.1–1.2)
ALBUMIN SERPL-MCNC: 4.4 G/DL (ref 3.5–5.2)
ALBUMIN/GLOBULIN RATIO: 1.9 (ref 1–2.5)
ALP BLD-CCNC: 81 U/L (ref 35–104)
ALT SERPL-CCNC: 15 U/L (ref 5–33)
ANION GAP SERPL CALCULATED.3IONS-SCNC: 12 MMOL/L (ref 9–17)
AST SERPL-CCNC: 15 U/L
BASOPHILS # BLD: 1 % (ref 0–2)
BASOPHILS ABSOLUTE: 0.1 K/UL (ref 0–0.2)
BILIRUB SERPL-MCNC: 0.33 MG/DL (ref 0.3–1.2)
BUN BLDV-MCNC: 14 MG/DL (ref 6–20)
BUN/CREAT BLD: ABNORMAL (ref 9–20)
CALCIUM SERPL-MCNC: 9 MG/DL (ref 8.6–10.4)
CHLORIDE BLD-SCNC: 100 MMOL/L (ref 98–107)
CO2: 24 MMOL/L (ref 20–31)
CREAT SERPL-MCNC: 0.7 MG/DL (ref 0.5–0.9)
D-DIMER QUANTITATIVE: 0.59 MG/L FEU
DIFFERENTIAL TYPE: NORMAL
EOSINOPHILS RELATIVE PERCENT: 3 % (ref 1–4)
GFR AFRICAN AMERICAN: >60 ML/MIN
GFR NON-AFRICAN AMERICAN: >60 ML/MIN
GFR SERPL CREATININE-BSD FRML MDRD: ABNORMAL ML/MIN/{1.73_M2}
GFR SERPL CREATININE-BSD FRML MDRD: ABNORMAL ML/MIN/{1.73_M2}
GLUCOSE BLD-MCNC: 116 MG/DL (ref 70–99)
HCT VFR BLD CALC: 40.3 % (ref 36–46)
HEMOGLOBIN: 13.8 G/DL (ref 12–16)
IMMATURE GRANULOCYTES: NORMAL %
LIPASE: 102 U/L (ref 13–60)
LYMPHOCYTES # BLD: 25 % (ref 24–44)
MCH RBC QN AUTO: 29 PG (ref 26–34)
MCHC RBC AUTO-ENTMCNC: 34.1 G/DL (ref 31–37)
MCV RBC AUTO: 85 FL (ref 80–100)
MONOCYTES # BLD: 5 % (ref 2–11)
NRBC AUTOMATED: NORMAL PER 100 WBC
PDW BLD-RTO: 13.7 % (ref 12.5–15.4)
PLATELET # BLD: 227 K/UL (ref 140–450)
PLATELET ESTIMATE: NORMAL
PMV BLD AUTO: 7.7 FL (ref 6–12)
POTASSIUM SERPL-SCNC: 3.9 MMOL/L (ref 3.7–5.3)
RBC # BLD: 4.75 M/UL (ref 4–5.2)
RBC # BLD: NORMAL 10*6/UL
SEG NEUTROPHILS: 66 % (ref 36–66)
SEGMENTED NEUTROPHILS ABSOLUTE COUNT: 4 K/UL (ref 1.8–7.7)
SODIUM BLD-SCNC: 136 MMOL/L (ref 135–144)
TOTAL PROTEIN: 6.7 G/DL (ref 6.4–8.3)
TROPONIN INTERP: NORMAL
TROPONIN INTERP: NORMAL
TROPONIN T: NORMAL NG/ML
TROPONIN T: NORMAL NG/ML
TROPONIN, HIGH SENSITIVITY: <6 NG/L (ref 0–14)
TROPONIN, HIGH SENSITIVITY: <6 NG/L (ref 0–14)
WBC # BLD: 6.1 K/UL (ref 3.5–11)
WBC # BLD: NORMAL 10*3/UL

## 2021-11-07 PROCEDURE — 80053 COMPREHEN METABOLIC PANEL: CPT

## 2021-11-07 PROCEDURE — 2580000003 HC RX 258: Performed by: EMERGENCY MEDICINE

## 2021-11-07 PROCEDURE — 6360000002 HC RX W HCPCS: Performed by: EMERGENCY MEDICINE

## 2021-11-07 PROCEDURE — 36415 COLL VENOUS BLD VENIPUNCTURE: CPT

## 2021-11-07 PROCEDURE — 85025 COMPLETE CBC W/AUTO DIFF WBC: CPT

## 2021-11-07 PROCEDURE — 83690 ASSAY OF LIPASE: CPT

## 2021-11-07 PROCEDURE — 85379 FIBRIN DEGRADATION QUANT: CPT

## 2021-11-07 PROCEDURE — 99284 EMERGENCY DEPT VISIT MOD MDM: CPT

## 2021-11-07 PROCEDURE — 6370000000 HC RX 637 (ALT 250 FOR IP): Performed by: PHYSICIAN ASSISTANT

## 2021-11-07 PROCEDURE — 71260 CT THORAX DX C+: CPT

## 2021-11-07 PROCEDURE — 6360000004 HC RX CONTRAST MEDICATION: Performed by: EMERGENCY MEDICINE

## 2021-11-07 PROCEDURE — 96374 THER/PROPH/DIAG INJ IV PUSH: CPT

## 2021-11-07 PROCEDURE — 84484 ASSAY OF TROPONIN QUANT: CPT

## 2021-11-07 PROCEDURE — 93005 ELECTROCARDIOGRAM TRACING: CPT | Performed by: PHYSICIAN ASSISTANT

## 2021-11-07 RX ORDER — MORPHINE SULFATE 4 MG/ML
4 INJECTION, SOLUTION INTRAMUSCULAR; INTRAVENOUS ONCE
Status: COMPLETED | OUTPATIENT
Start: 2021-11-07 | End: 2021-11-07

## 2021-11-07 RX ORDER — 0.9 % SODIUM CHLORIDE 0.9 %
80 INTRAVENOUS SOLUTION INTRAVENOUS ONCE
Status: COMPLETED | OUTPATIENT
Start: 2021-11-07 | End: 2021-11-07

## 2021-11-07 RX ORDER — ASPIRIN 81 MG/1
324 TABLET, CHEWABLE ORAL ONCE
Status: DISCONTINUED | OUTPATIENT
Start: 2021-11-07 | End: 2021-11-07

## 2021-11-07 RX ORDER — ASPIRIN 81 MG/1
162 TABLET, CHEWABLE ORAL ONCE
Status: COMPLETED | OUTPATIENT
Start: 2021-11-07 | End: 2021-11-07

## 2021-11-07 RX ORDER — SODIUM CHLORIDE 0.9 % (FLUSH) 0.9 %
10 SYRINGE (ML) INJECTION PRN
Status: DISCONTINUED | OUTPATIENT
Start: 2021-11-07 | End: 2021-11-07 | Stop reason: HOSPADM

## 2021-11-07 RX ADMIN — SODIUM CHLORIDE 80 ML: 9 INJECTION, SOLUTION INTRAVENOUS at 11:55

## 2021-11-07 RX ADMIN — MORPHINE SULFATE 4 MG: 4 INJECTION INTRAVENOUS at 11:39

## 2021-11-07 RX ADMIN — IOPAMIDOL 75 ML: 755 INJECTION, SOLUTION INTRAVENOUS at 11:56

## 2021-11-07 RX ADMIN — ASPIRIN 162 MG: 81 TABLET, CHEWABLE ORAL at 11:39

## 2021-11-07 RX ADMIN — SODIUM CHLORIDE, PRESERVATIVE FREE 10 ML: 5 INJECTION INTRAVENOUS at 11:56

## 2021-11-07 ASSESSMENT — PAIN DESCRIPTION - LOCATION
LOCATION: BACK;ARM;SHOULDER
LOCATION: CHEST

## 2021-11-07 ASSESSMENT — PAIN DESCRIPTION - PAIN TYPE: TYPE: ACUTE PAIN

## 2021-11-07 ASSESSMENT — PAIN DESCRIPTION - ORIENTATION: ORIENTATION: LEFT

## 2021-11-07 ASSESSMENT — PAIN SCALES - GENERAL
PAINLEVEL_OUTOF10: 5
PAINLEVEL_OUTOF10: 4

## 2021-11-07 ASSESSMENT — PAIN DESCRIPTION - DESCRIPTORS: DESCRIPTORS: ACHING

## 2021-11-07 NOTE — ED PROVIDER NOTES
Risk Score:   History         Highly Suspicious                    2                      Moderately Suspicious            1                      Slight Suspicious                     0  EKG            Significant ST Depression       2                      Nonspecific                              1                      Normal                                     0  Age              > or = 65                                  2                      > 45 to < 65                             1                     < or = 45                                  0  Risk Factors    > or = 3                         2                          1 or 2                            1                          0                                 0  Troponin          > or = 3 times normal limit       2                          > 1 and < 3 times limit             1                          Normal                                     0  Score               0 - 3      Low Risk                           4 - 6      Moderate risk                           7 - 10    High Risk    * Risk Factors include: Diabetes, Tobacco use, Hypertension, Hyperlipidemia, Family History of CAD and Obesity        Nursing Notes were reviewed. REVIEW OF SYSTEMS    (2-9 systems for level 4, 10 or more for level 5)     Constitutional: Denies recent fever, chills. Eyes: No visual changes. Neck: No neck pain. Respiratory: per HPI  Cardiac:  per HPI  GI:  Denies abdominal pain/nausea/vomiting/diarrhea. Musculoskeletal: Denies focal weakness. Neurologic: per HPI  Skin:  No rash. Except as noted above the remainder of the review of systems was reviewed and negative. PAST MEDICAL HISTORY   History reviewed.         Diagnosis Date    Herpes labialis     Hyperlipidemia     Hypertension     Uterine leiomyoma          SURGICAL HISTORY           Procedure Laterality Date     SECTION      HYSTERECTOMY  2018    With Removal of Submucous Leiomyomata    LAPAROSCOPY      Excis Endometriotic Tissue Uterosacral Ligaments       CURRENT MEDICATIONS       Previous Medications    BUSPIRONE (BUSPAR) 5 MG TABLET    Take 1 or 2 tablets tid PRN    LISINOPRIL-HYDROCHLOROTHIAZIDE (PRINZIDE;ZESTORETIC) 20-12.5 MG PER TABLET    Take 1 tablet by mouth daily    MULTIPLE VITAMIN (MULTI-DAY) TABS    Take 1 tablet by mouth daily    NALTREXONE-BUPROPION (CONTRAVE) 8-90 MG PER EXTENDED RELEASE TABLET    Take 2 tablets by mouth 2 times daily Week 1: 1Tablet am  Week 2: 1 Tablet am and 1 tablet pm  Week 3: 2 tablets am and 1 tablet pm  Week 4: 2 tablets am and 2 tablets pm    NALTREXONE-BUPROPION (CONTRAVE) 8-90 MG PER EXTENDED RELEASE TABLET    Take 2 tablets by mouth 2 times daily    NARATRIPTAN (AMERGE) 2.5 MG TABLET    Take 1 tablet by mouth once as needed for Migraine 2.5 mg at onset of headache, may repeat in 4 hours if needed    OMEPRAZOLE (PRILOSEC) 40 MG DELAYED RELEASE CAPSULE    Take 1 capsule by mouth every morning (before breakfast)       ALLERGIES     Patient has no known allergies. FAMILY HISTORY           Problem Relation Age of Onset    Other Mother         Cluster Headaches; Fibromyalgia    Heart Disease Mother     High Blood Pressure Mother     Depression Mother     Diabetes Mother     Stroke Mother     Coronary Art Dis Mother     Other Father         Alcoholism, COPD    Other Sister         Ovarian Neoplasm    Cancer Maternal Grandmother         breast    Heart Disease Maternal Grandfather     Cancer Paternal Grandmother         breast    Stroke Paternal Grandmother     Arthritis Paternal Grandmother     Cancer Paternal Grandfather         lung     Family Status   Relation Name Status    Mother  (Not Specified)    Father  (Not Specified)   [facility] Sister  (Not Specified)    MGM  (Not Specified)    MGF  (Not Specified)    PGM  (Not Specified)    PGF  (Not Specified)        SOCIAL HISTORY      reports that she has never smoked.  She has never used smokeless tobacco. She reports current alcohol use. She reports that she does not use drugs. Lives with others. PHYSICAL EXAM    (up to 7 for level 4, 8 or more for level 5)     ED Triage Vitals [11/07/21 1055]   BP Temp Temp Source Pulse Resp SpO2 Height Weight   139/85 98.4 °F (36.9 °C) Oral 81 14 96 % 5' 4\" (1.626 m) 204 lb (92.5 kg)       Constitutional:  Well developed   Eyes:  Pupils equal/round  HENT:  Atraumatic, external ears normal, nose normal, oropharynx moist. Neck- supple   Respiratory:  Clear to auscultation bilaterally with good air exchange, no W/R/R. No anterior posterior chestwall tenderness  Cardiovascular:  RRR with normal S1 and S2. Gastrointestinal/Abdomen:  Soft, NT. BS wnl. Musculoskeletal:  No edema, no tenderness, no deformities. No pain elicited with upper extremity movement. Bilateral lower extremities nontender no pitting edema or calf tenderness. Back:  No CVA tenderness. No midline thoracic or lumbar tenderness palpation. Normal to inspection. Integument:  No rash. Neurologic:  Alert, appropriate mentation/interaction, no focal deficits noted     DIAGNOSTIC RESULTS     EKG: All EKG's are interpreted by the Emergency Department Physician who either signs or Co-signs this chart in the absence of a cardiologist.    Not indicated, or per attending note      RADIOLOGY:   Non-plain film images such as CT, Ultrasound and MRI are read by the radiologist. Plain radiographic images are visualized and preliminarily interpreted by the emergency physician with the below findings:      Interpretation per the Radiologist below, if available at the time of this note:    CT CHEST PULMONARY EMBOLISM W CONTRAST   Final Result   No evidence of pulmonary embolism or acute pulmonary abnormality.                LABS:  Labs Reviewed   COMPREHENSIVE METABOLIC PANEL - Abnormal; Notable for the following components:       Result Value    Glucose 116 (*)     All other components within normal limits   LIPASE - Abnormal; Notable for the following components:    Lipase 102 (*)     All other components within normal limits   CBC WITH AUTO DIFFERENTIAL   TROPONIN   D-DIMER, QUANTITATIVE   TROPONIN   URINE RT REFLEX TO CULTURE       All other labs were within normal range or not returned as of this dictation. EMERGENCY DEPARTMENT COURSE and DIFFERENTIAL DIAGNOSIS/MDM:   Vitals:    Vitals:    11/07/21 1055 11/07/21 1207   BP: 139/85 121/71   Pulse: 81 73   Resp: 14 18   Temp: 98.4 °F (36.9 °C)    TempSrc: Oral    SpO2: 96% 98%   Weight: 92.5 kg (204 lb)    Height: 5' 4\" (1.626 m)      Orders Placed This Encounter   Medications    DISCONTD: aspirin chewable tablet 324 mg    morphine injection 4 mg    aspirin chewable tablet 162 mg    sodium chloride flush 0.9 % injection 10 mL    0.9 % sodium chloride bolus    iopamidol (ISOVUE-370) 76 % injection 75 mL       1108  Cardiac work-up initiated, lungs clear to auscultation, patient in no respiratory pain distress. Abdomen benign. All vital signs stable. 94 31 11  This patient was seen by the attending physician and they agreed with the assessment and plan. We reviewed the disposition diagnosis with the patient and or their family/guardian. We have answered their questions and given discharge instructions. They voiced understanding of these instructions and did not have any further questions or complaints. The patient presents with chest pain that is not suggestive of in nature of pulmonary embolus, pneumothorax, aortic dissection, cardiac ischemia, aortic dissection, or other serious etiology. Given the extremely low risk of these diagnoses further testing and evaluation are not indicated at this time. The patient has been instructed to follow up with their primary care physician and to return immediately to an ED if the symptoms change or worsen in any way.   Patient verbalized understanding. CONSULTS:  None    PROCEDURES:  None    FINAL IMPRESSION      1. Left arm pain    2.  Chest pain, unspecified type          DISPOSITION/PLAN   DISPOSITION Decision To Discharge 11/07/2021 01:48:37 PM      PATIENT REFERRED TO:  Sandra Pearson PA-C  [address] [place] [address]  [phone]    Schedule an appointment as soon as possible for a visit in 3 days        DISCHARGE MEDICATIONS:  New Prescriptions    No medications on file       (Please note that portions of this note were completed with a voice recognition program.  Efforts were made to edit the dictations but occasionally words are mis-transcribed.)    DAVIDE Nuñez PA-C  11/07/21 [id number]

## 2021-11-07 NOTE — ED NOTES
AVS reviewed with the patient. Discussed follow-up care, taking medications as prescribed, and when to call 911. VSS. All questions answered. IV Removed per order. Ambulatory out of the department with a steady, unassisted gait.         Abi Dutton RN  11/07/21 5106

## 2021-11-07 NOTE — ED PROVIDER NOTES
Emergency Department            COMPLAINT       Chief Complaint   Patient presents with    Arm Pain     left arm radiates to left shoulder blade, sudden onset      PHYSICAL EXAM      ED Triage Vitals [11/07/21 1055]   BP Temp Temp Source Pulse Resp SpO2 Height Weight   139/85 98.4 °F (36.9 °C) Oral 81 14 96 % 5' 4\" (1.626 m) 204 lb (92.5 kg)         Constitutional: Alert, oriented x3, nontoxic, answering questions appropriately, acting properly for age, in no acute distress   HEENT: Extraocular muscles intact  Neck: Trachea midline   Cardiovascular: Regular rhythm and rate no S3, S4, or murmurs   Respiratory: Clear to auscultation bilaterally no wheezes, rhonchi, rales, no respiratory distress no tachypnea no retractions no hypoxia  Gastrointestinal: Soft, nontender, nondistended, positive bowel sounds. No rebound, rigidity, or guarding. No abdominal bruit no pulsatile mass  Musculoskeletal: No extremity pain or swelling no calf tenderness or asymmetry  Neurologic: Moving all 4 extremities without difficulty there are no gross focal neurologic deficits   Skin: Warm and dry       Physical Exam  DIAGNOSTIC RESULTS     EKG: All EKG's are interpreted by the Emergency Department Physician who either signs or Co-signs this chart in the absence of a cardiologist.    1058 sinus rhythm rate 80  QRS 72  no acute ST or T wave changes.     Not indicated unless otherwise documented above or in the midlevel documentation    LABS:  Results for orders placed or performed during the hospital encounter of 11/07/21   CBC Auto Differential   Result Value Ref Range    WBC 6.1 3.5 - 11.0 k/uL    RBC 4.75 4.0 - 5.2 m/uL    Hemoglobin 13.8 12.0 - 16.0 g/dL    Hematocrit 40.3 36 - 46 %    MCV 85.0 80 - 100 fL    MCH 29.0 26 - 34 pg    MCHC 34.1 31 - 37 g/dL    RDW 13.7 12.5 - 15.4 %    Platelets 071 897 - 476 k/uL    MPV 7.7 6.0 - 12.0 fL    NRBC Automated NOT REPORTED per 100 WBC    Differential Type NOT REPORTED Seg Neutrophils 66 36 - 66 %    Lymphocytes 25 24 - 44 %    Monocytes 5 2 - 11 %    Eosinophils % 3 1 - 4 %    Basophils 1 0 - 2 %    Immature Granulocytes NOT REPORTED 0 %    Segs Absolute 4.00 1.8 - 7.7 k/uL    Absolute Lymph # 1.50 1.0 - 4.8 k/uL    Absolute Mono # 0.30 0.1 - 1.2 k/uL    Absolute Eos # 0.20 0.0 - 0.4 k/uL    Basophils Absolute 0.10 0.0 - 0.2 k/uL    Absolute Immature Granulocyte NOT REPORTED 0.00 - 0.30 k/uL    WBC Morphology NOT REPORTED     RBC Morphology NOT REPORTED     Platelet Estimate NOT REPORTED    Comprehensive Metabolic Panel   Result Value Ref Range    Glucose 116 (H) 70 - 99 mg/dL    BUN 14 6 - 20 mg/dL    CREATININE 0.70 0.50 - 0.90 mg/dL    Bun/Cre Ratio NOT REPORTED 9 - 20    Calcium 9.0 8.6 - 10.4 mg/dL    Sodium 136 135 - 144 mmol/L    Potassium 3.9 3.7 - 5.3 mmol/L    Chloride 100 98 - 107 mmol/L    CO2 24 20 - 31 mmol/L    Anion Gap 12 9 - 17 mmol/L    Alkaline Phosphatase 81 35 - 104 U/L    ALT 15 5 - 33 U/L    AST 15 <32 U/L    Total Bilirubin 0.33 0.3 - 1.2 mg/dL    Total Protein 6.7 6.4 - 8.3 g/dL    Albumin 4.4 3.5 - 5.2 g/dL    Albumin/Globulin Ratio 1.9 1.0 - 2.5    GFR Non-African American >60 >60 mL/min    GFR African American >60 >60 mL/min    GFR Comment          GFR Staging NOT REPORTED    Lipase   Result Value Ref Range    Lipase 102 (H) 13 - 60 U/L   Troponin   Result Value Ref Range    Troponin, High Sensitivity <6 0 - 14 ng/L    Troponin T NOT REPORTED <0.03 ng/mL    Troponin Interp NOT REPORTED    D-Dimer, Quantitative   Result Value Ref Range    D-Dimer, Quant 0.59 mg/L FEU   Troponin   Result Value Ref Range    Troponin, High Sensitivity <6 0 - 14 ng/L    Troponin T NOT REPORTED <0.03 ng/mL    Troponin Interp NOT REPORTED        Not indicated unless otherwise documented above or in the midlevel documentation    RADIOLOGY:   I reviewedthe radiologist interpretations:  CT CHEST PULMONARY EMBOLISM W CONTRAST   Final Result   No evidence of pulmonary embolism or acute pulmonary abnormality. Not indicated unless otherwise documented above or in the midlevel documentation    EMERGENCY DEPARTMENT COURSE:       PERTINENT ATTENDING PHYSICIAN COMMENTS:    Left arm pain started this morning at 7 AM just after she woke up. Pain radiated through to her back. Continues to have pain 4/10. History of hypertension and hyperlipidemia as well as family history of heart disease. Took 2 baby aspirin just prior to arrival.  No shortness of breath. No other symptoms. IV and labs. EKG. Chest imaging. 12:55 PM resting comfortably no acute distress. Lipase slightly elevated otherwise unremarkable. Dimer was high prompting a CT of the chest which was negative for pulmonary embolism normal first troponin. Normal EKG no ST elevations. Low suspicion for cardiac etiology. Will repeat troponin    1:45 PM resting comfortably feeling better. CT negative for pulmonary embolism or other acute intrathoracic process. Low suspicion that this is cardiac. Will discharge to follow-up with family physician and return if any worsening symptoms or any other concerns. Faculty Attestation    I performed a history and physical examination of the patient and discussed management with the mid level provideer. I reviewed the mid level provider's note and agree with the documented findings and plan of care. Any areas of disagreement are noted on the chart. I was personally present for the key portions of any procedures. I have documented in the chart those procedures where I was not present during the key portions. I have reviewed the emergency nurses triage note. I agree with the chief complaint, past medical history, past surgical history, allergies, medications, social and family history as documented unless otherwise noted below.  Documentation of the HPI, Physical Exam and Medical Decision Making performed by medical students or scribes is based on my personal performance of the HPI, PE and MDM. For Physician Assistant/ Nurse Practitioner cases/documentation I have personally evaluated this patient and have completed at least one if not all key elements of the E/M (history, physical exam, and MDM). Additional findings are as noted.        Jennifer Ivan DO  11/07/21 9560

## 2021-11-08 LAB
EKG ATRIAL RATE: 80 BPM
EKG P AXIS: 40 DEGREES
EKG P-R INTERVAL: 138 MS
EKG Q-T INTERVAL: 372 MS
EKG QRS DURATION: 72 MS
EKG QTC CALCULATION (BAZETT): 429 MS
EKG T AXIS: 13 DEGREES
EKG VENTRICULAR RATE: 80 BPM

## 2021-12-07 RX ORDER — LISINOPRIL AND HYDROCHLOROTHIAZIDE 20; 12.5 MG/1; MG/1
TABLET ORAL
Qty: 90 TABLET | Refills: 3 | Status: SHIPPED | OUTPATIENT
Start: 2021-12-07 | End: 2022-09-06 | Stop reason: SINTOL

## 2022-01-25 ENCOUNTER — NURSE ONLY (OUTPATIENT)
Dept: PRIMARY CARE CLINIC | Age: 47
End: 2022-01-25

## 2022-01-25 ENCOUNTER — HOSPITAL ENCOUNTER (OUTPATIENT)
Age: 47
Setting detail: SPECIMEN
Discharge: HOME | End: 2022-01-25

## 2022-01-25 DIAGNOSIS — R05.9 COUGH: Primary | ICD-10-CM

## 2022-01-25 DIAGNOSIS — R53.83 FATIGUE, UNSPECIFIED TYPE: ICD-10-CM

## 2022-01-26 DIAGNOSIS — R53.83 FATIGUE, UNSPECIFIED TYPE: ICD-10-CM

## 2022-01-26 DIAGNOSIS — R05.9 COUGH: ICD-10-CM

## 2022-01-26 LAB
SARS-COV-2: ABNORMAL
SARS-COV-2: DETECTED
SOURCE: ABNORMAL

## 2022-01-28 RX ORDER — BENZONATATE 100 MG/1
100-200 CAPSULE ORAL 3 TIMES DAILY PRN
Qty: 60 CAPSULE | Refills: 0 | Status: SHIPPED | OUTPATIENT
Start: 2022-01-28 | End: 2022-02-04

## 2022-05-05 RX ORDER — BUSPIRONE HYDROCHLORIDE 5 MG/1
TABLET ORAL
Qty: 270 TABLET | Refills: 1 | Status: SHIPPED | OUTPATIENT
Start: 2022-05-05 | End: 2022-09-06

## 2022-05-19 ENCOUNTER — HOSPITAL ENCOUNTER (OUTPATIENT)
Age: 47
Setting detail: SPECIMEN
Discharge: HOME | End: 2022-05-19

## 2022-05-19 ENCOUNTER — OFFICE VISIT (OUTPATIENT)
Dept: PRIMARY CARE CLINIC | Age: 47
End: 2022-05-19
Payer: COMMERCIAL

## 2022-05-19 VITALS
OXYGEN SATURATION: 96 % | SYSTOLIC BLOOD PRESSURE: 132 MMHG | WEIGHT: 205.2 LBS | TEMPERATURE: 98 F | HEART RATE: 102 BPM | BODY MASS INDEX: 35.22 KG/M2 | DIASTOLIC BLOOD PRESSURE: 80 MMHG

## 2022-05-19 DIAGNOSIS — B00.1 COLD SORE: ICD-10-CM

## 2022-05-19 DIAGNOSIS — R05.9 COUGH: Primary | ICD-10-CM

## 2022-05-19 DIAGNOSIS — R50.9 FEVER, UNSPECIFIED FEVER CAUSE: ICD-10-CM

## 2022-05-19 PROCEDURE — 99213 OFFICE O/P EST LOW 20 MIN: CPT | Performed by: PHYSICIAN ASSISTANT

## 2022-05-19 ASSESSMENT — ENCOUNTER SYMPTOMS
RHINORRHEA: 1
WHEEZING: 1
COUGH: 1

## 2022-05-19 NOTE — PROGRESS NOTES
[facility]  [address] [place] [id number]  Phone: [phone]  Fax: [phone]    Vishnu Arguelles is a 52 y.o. female who presents today for her medical conditions/complaintsas noted below. Chief Complaint   Patient presents with    Cough     Pt went to [facility] x5 days, took Z-pack,& prednisone. Pt said she was tested for flu and covid and both were negative. Pt says cough is mostly dry.  Other     lip has cold sore. Pt cant taste or smell x3 days. Pt took home covid tests all negative.  Fever     low grade fever. tested this mornin.7. Pt said she took ibuprofen this morning. HPI:     HPI  Not feeling better after the Zpak and prednisone. Dry cough mostly and no taste and smell now    Current Outpatient Medications   Medication Sig Dispense Refill    valACYclovir (VALTREX) 1 g tablet Take 2 tablets by mouth 2 times daily for 1 day For oral stomatitis 30 tablet 0    guaiFENesin-codeine (CHERATUSSIN AC) 100-10 MG/5ML syrup Take 5 mLs by mouth every 4 hours as needed for Cough for up to 3 days. 240 mL 0    albuterol sulfate HFA (VENTOLIN HFA) 108 (90 Base) MCG/ACT inhaler Inhale 2 puffs into the lungs 4 times daily as needed for Wheezing 18 g 0    busPIRone (BUSPAR) 5 MG tablet Take 1 or 2 tablets tid  tablet 1    lisinopril-hydroCHLOROthiazide (PRINZIDE;ZESTORETIC) 20-12.5 MG per tablet TAKE 1 TABLET DAILY 90 tablet 3    Multiple Vitamin (MULTI-DAY) TABS Take 1 tablet by mouth daily      naratriptan (AMERGE) 2.5 MG tablet Take 1 tablet by mouth once as needed for Migraine 2.5 mg at onset of headache, may repeat in 4 hours if needed 9 tablet 2    omeprazole (PRILOSEC) 40 MG delayed release capsule Take 1 capsule by mouth every morning (before breakfast) (Patient not taking: Reported on 2022) 30 capsule 5     No current facility-administered medications for this visit.      No Known Allergies    Subjective:      Review of Systems Constitutional: Positive for fever (low grade). HENT: Positive for congestion, mouth sores (cold sore) and rhinorrhea. Can't taste or smell   Respiratory: Positive for cough and wheezing. Objective:     /80   Pulse 102   Temp 98 °F (36.7 °C)   Wt 205 lb 3.2 oz (93.1 kg)   SpO2 96%   BMI 35.22 kg/m²   Physical Exam  Vitals and nursing note reviewed. Constitutional:       Appearance: She is ill-appearing. HENT:      Right Ear: Tympanic membrane, ear canal and external ear normal.      Left Ear: Tympanic membrane, ear canal and external ear normal.      Nose: Congestion present. Mouth/Throat:      Lips: Lesions (cold sore on upper lip) present. Mouth: Mucous membranes are moist.      Pharynx: Posterior oropharyngeal erythema present. No oropharyngeal exudate. Tonsils: No tonsillar exudate. Eyes:      General:         Right eye: No discharge. Left eye: No discharge. Conjunctiva/sclera: Conjunctivae normal.      Pupils: Pupils are equal, round, and reactive to light. Cardiovascular:      Rate and Rhythm: Regular rhythm. Tachycardia present. Heart sounds: Normal heart sounds. Pulmonary:      Effort: Pulmonary effort is normal.      Breath sounds: Wheezing present. No rhonchi or rales. Lymphadenopathy:      Cervical: No cervical adenopathy. Skin:     Findings: No rash. Neurological:      Mental Status: She is alert. Assessment:       Diagnosis Orders   1. Cough  guaiFENesin-codeine (CHERATUSSIN AC) 100-10 MG/5ML syrup    albuterol sulfate HFA (VENTOLIN HFA) 108 (90 Base) MCG/ACT inhaler   2. Cold sore  valACYclovir (VALTREX) 1 g tablet   3. Fever, unspecified fever cause  COVID-19        Plan:    Off work this week  Covid test  Cherstussin for nighttime  Valtrex sent in    No follow-ups on file.     Orders Placed This Encounter   Procedures    COVID-19     Standing Status:   Future     Standing Expiration Date:   5/19/2023     Scheduling Instructions:      1) Due to current limited availability of the COVID-19 test, tests will be prioritized based on responses to questions above. Testing may be delayed due to volume. 2) Print and instruct patient to adhere to CDC home isolation program. (Link Above)              3) Set up or refer patient for a monitoring program.              4) Have patient sign up for and leverage MyChart (if not previously done). Order Specific Question:   Is this test for diagnosis or screening? Answer:   Diagnosis of ill patient     Order Specific Question:   Symptomatic for COVID-19 as defined by CDC? Answer:   Yes     Order Specific Question:   Date of Symptom Onset     Answer:   5/12/2022     Order Specific Question:   Hospitalized for COVID-19? Answer:   No     Order Specific Question:   Admitted to ICU for COVID-19? Answer:   No     Order Specific Question:   Employed in healthcare setting? Answer:   No     Order Specific Question:   Resident in a congregate (group) care setting? Answer:   No     Order Specific Question:   Pregnant? Answer:   No     Order Specific Question:   Previously tested for COVID-19? Answer:   Yes     Orders Placed This Encounter   Medications    valACYclovir (VALTREX) 1 g tablet     Sig: Take 2 tablets by mouth 2 times daily for 1 day For oral stomatitis     Dispense:  30 tablet     Refill:  0    guaiFENesin-codeine (CHERATUSSIN AC) 100-10 MG/5ML syrup     Sig: Take 5 mLs by mouth every 4 hours as needed for Cough for up to 3 days.      Dispense:  240 mL     Refill:  0     Reduce doses taken as pain becomes manageable    albuterol sulfate HFA (VENTOLIN HFA) 108 (90 Base) MCG/ACT inhaler     Sig: Inhale 2 puffs into the lungs 4 times daily as needed for Wheezing     Dispense:  18 g     Refill:  0           Electronically signed by Padmini Frost 5/20/2022 at 12:56 PM

## 2022-05-20 LAB
SARS-COV-2: NORMAL
SARS-COV-2: NOT DETECTED
SOURCE: NORMAL

## 2022-05-20 RX ORDER — GUAIFENESIN AND CODEINE PHOSPHATE 100; 10 MG/5ML; MG/5ML
5 SOLUTION ORAL EVERY 4 HOURS PRN
Qty: 240 ML | Refills: 0 | Status: SHIPPED | OUTPATIENT
Start: 2022-05-20 | End: 2022-05-23

## 2022-05-20 RX ORDER — ALBUTEROL SULFATE 90 UG/1
2 AEROSOL, METERED RESPIRATORY (INHALATION) 4 TIMES DAILY PRN
Qty: 18 G | Refills: 0 | Status: SHIPPED | OUTPATIENT
Start: 2022-05-20 | End: 2022-09-06

## 2022-05-20 RX ORDER — VALACYCLOVIR HYDROCHLORIDE 1 G/1
2000 TABLET, FILM COATED ORAL 2 TIMES DAILY
Qty: 30 TABLET | Refills: 0 | Status: SHIPPED | OUTPATIENT
Start: 2022-05-20 | End: 2022-05-21

## 2022-06-06 ENCOUNTER — OFFICE VISIT (OUTPATIENT)
Dept: PRIMARY CARE CLINIC | Age: 47
End: 2022-06-06
Payer: COMMERCIAL

## 2022-06-06 ENCOUNTER — HOSPITAL ENCOUNTER (OUTPATIENT)
Dept: VASCULAR LAB | Age: 47
Discharge: HOME | End: 2022-06-06
Payer: COMMERCIAL

## 2022-06-06 ENCOUNTER — HOSPITAL ENCOUNTER (OUTPATIENT)
Age: 47
Discharge: HOME | End: 2022-06-06
Payer: COMMERCIAL

## 2022-06-06 VITALS
DIASTOLIC BLOOD PRESSURE: 78 MMHG | SYSTOLIC BLOOD PRESSURE: 130 MMHG | HEART RATE: 95 BPM | OXYGEN SATURATION: 97 % | BODY MASS INDEX: 34.45 KG/M2 | WEIGHT: 201.8 LBS | HEIGHT: 64 IN

## 2022-06-06 DIAGNOSIS — E78.5 DYSLIPIDEMIA: ICD-10-CM

## 2022-06-06 DIAGNOSIS — R74.8 ELEVATED LIPASE: ICD-10-CM

## 2022-06-06 DIAGNOSIS — R06.02 SHORTNESS OF BREATH: ICD-10-CM

## 2022-06-06 DIAGNOSIS — R05.9 COUGH: ICD-10-CM

## 2022-06-06 DIAGNOSIS — M79.605 PAIN OF LEFT LOWER EXTREMITY: ICD-10-CM

## 2022-06-06 DIAGNOSIS — I10 ESSENTIAL HYPERTENSION: ICD-10-CM

## 2022-06-06 DIAGNOSIS — R05.9 COUGH: Primary | ICD-10-CM

## 2022-06-06 LAB
ABSOLUTE EOS #: 0 K/UL (ref 0–0.4)
ABSOLUTE LYMPH #: 2.1 K/UL (ref 1–4.8)
ABSOLUTE MONO #: 0.4 K/UL (ref 0.1–1.3)
ALBUMIN SERPL-MCNC: 4.2 G/DL (ref 3.5–5.2)
ALP BLD-CCNC: 69 U/L (ref 35–104)
ALT SERPL-CCNC: 20 U/L (ref 5–33)
ANION GAP SERPL CALCULATED.3IONS-SCNC: 12 MMOL/L (ref 9–17)
AST SERPL-CCNC: 19 U/L
BASOPHILS # BLD: 0 % (ref 0–2)
BASOPHILS ABSOLUTE: 0 K/UL (ref 0–0.2)
BILIRUB SERPL-MCNC: 0.34 MG/DL (ref 0.3–1.2)
BUN BLDV-MCNC: 17 MG/DL (ref 6–20)
CALCIUM SERPL-MCNC: 9.1 MG/DL (ref 8.6–10.4)
CHLORIDE BLD-SCNC: 102 MMOL/L (ref 98–107)
CHOLESTEROL/HDL RATIO: 6.4
CHOLESTEROL: 267 MG/DL
CO2: 26 MMOL/L (ref 20–31)
CREAT SERPL-MCNC: 0.83 MG/DL (ref 0.5–0.9)
D-DIMER QUANTITATIVE: 0.54 MG/L FEU (ref 0–0.59)
EOSINOPHILS RELATIVE PERCENT: 0 % (ref 0–4)
GFR AFRICAN AMERICAN: >60 ML/MIN
GFR NON-AFRICAN AMERICAN: >60 ML/MIN
GFR SERPL CREATININE-BSD FRML MDRD: NORMAL ML/MIN/{1.73_M2}
GLUCOSE FASTING: 94 MG/DL (ref 70–99)
HCT VFR BLD CALC: 39.4 % (ref 36–46)
HDLC SERPL-MCNC: 42 MG/DL
HEMOGLOBIN: 13.3 G/DL (ref 12–16)
LDL CHOLESTEROL: 192 MG/DL (ref 0–130)
LIPASE: 40 U/L (ref 13–60)
LYMPHOCYTES # BLD: 30 % (ref 24–44)
MAGNESIUM: 2 MG/DL (ref 1.6–2.6)
MCH RBC QN AUTO: 28.8 PG (ref 26–34)
MCHC RBC AUTO-ENTMCNC: 33.8 G/DL (ref 31–37)
MCV RBC AUTO: 85 FL (ref 80–100)
MONOCYTES # BLD: 5 % (ref 1–7)
PDW BLD-RTO: 13.6 % (ref 11.5–14.9)
PLATELET # BLD: 255 K/UL (ref 150–450)
PMV BLD AUTO: 7.8 FL (ref 6–12)
POTASSIUM SERPL-SCNC: 3.9 MMOL/L (ref 3.7–5.3)
RBC # BLD: 4.63 M/UL (ref 4–5.2)
SEG NEUTROPHILS: 65 % (ref 36–66)
SEGMENTED NEUTROPHILS ABSOLUTE COUNT: 4.6 K/UL (ref 1.3–9.1)
SODIUM BLD-SCNC: 140 MMOL/L (ref 135–144)
TOTAL PROTEIN: 6.7 G/DL (ref 6.4–8.3)
TRIGL SERPL-MCNC: 164 MG/DL
WBC # BLD: 7.1 K/UL (ref 3.5–11)

## 2022-06-06 PROCEDURE — 99214 OFFICE O/P EST MOD 30 MIN: CPT | Performed by: PHYSICIAN ASSISTANT

## 2022-06-06 PROCEDURE — 36415 COLL VENOUS BLD VENIPUNCTURE: CPT

## 2022-06-06 PROCEDURE — 80061 LIPID PANEL: CPT

## 2022-06-06 PROCEDURE — 85025 COMPLETE CBC W/AUTO DIFF WBC: CPT

## 2022-06-06 PROCEDURE — 80053 COMPREHEN METABOLIC PANEL: CPT

## 2022-06-06 PROCEDURE — 83690 ASSAY OF LIPASE: CPT

## 2022-06-06 PROCEDURE — 83735 ASSAY OF MAGNESIUM: CPT

## 2022-06-06 PROCEDURE — 85379 FIBRIN DEGRADATION QUANT: CPT

## 2022-06-06 PROCEDURE — 93971 EXTREMITY STUDY: CPT

## 2022-06-06 ASSESSMENT — PATIENT HEALTH QUESTIONNAIRE - PHQ9
SUM OF ALL RESPONSES TO PHQ QUESTIONS 1-9: 0
8. MOVING OR SPEAKING SO SLOWLY THAT OTHER PEOPLE COULD HAVE NOTICED. OR THE OPPOSITE, BEING SO FIGETY OR RESTLESS THAT YOU HAVE BEEN MOVING AROUND A LOT MORE THAN USUAL: 0
3. TROUBLE FALLING OR STAYING ASLEEP: 0
5. POOR APPETITE OR OVEREATING: 0
SUM OF ALL RESPONSES TO PHQ QUESTIONS 1-9: 0
4. FEELING TIRED OR HAVING LITTLE ENERGY: 0
7. TROUBLE CONCENTRATING ON THINGS, SUCH AS READING THE NEWSPAPER OR WATCHING TELEVISION: 0
SUM OF ALL RESPONSES TO PHQ QUESTIONS 1-9: 0
6. FEELING BAD ABOUT YOURSELF - OR THAT YOU ARE A FAILURE OR HAVE LET YOURSELF OR YOUR FAMILY DOWN: 0
2. FEELING DOWN, DEPRESSED OR HOPELESS: 0
SUM OF ALL RESPONSES TO PHQ QUESTIONS 1-9: 0
10. IF YOU CHECKED OFF ANY PROBLEMS, HOW DIFFICULT HAVE THESE PROBLEMS MADE IT FOR YOU TO DO YOUR WORK, TAKE CARE OF THINGS AT HOME, OR GET ALONG WITH OTHER PEOPLE: 0
1. LITTLE INTEREST OR PLEASURE IN DOING THINGS: 0
9. THOUGHTS THAT YOU WOULD BE BETTER OFF DEAD, OR OF HURTING YOURSELF: 0
SUM OF ALL RESPONSES TO PHQ9 QUESTIONS 1 & 2: 0

## 2022-06-06 ASSESSMENT — ENCOUNTER SYMPTOMS
RHINORRHEA: 0
SHORTNESS OF BREATH: 1
COUGH: 1

## 2022-06-06 NOTE — RESULT ENCOUNTER NOTE
Your recent test results  (labs) show the lipase back to normal level. Your cholesterol is high so we need to start a medication for this along with following the low fat, high fiber diet. Would you like to start it now or do you want to wait and discuss it when I see you for your PFT result? It would be Crestor or Lipitor.     The other labs are normal.

## 2022-06-06 NOTE — PROGRESS NOTES
[facility]  [address] [place] [address]  Phone: [phone]  Fax: [phone]    Carmen Kim is a 52 y.o. female who presents today for her medical conditions/complaintsas noted below. Chief Complaint   Patient presents with    Cough    Leg Pain     Left calf going into thigh    Shortness of Breath         HPI:     HPI  Still has cough from May  Is very worried about a clot  Has given up sugar and lost 4 pounds in last month  Went to [facility] and given some prednisone which helped. CXR was normal.      Current Outpatient Medications   Medication Sig Dispense Refill    albuterol sulfate HFA (VENTOLIN HFA) 108 (90 Base) MCG/ACT inhaler Inhale 2 puffs into the lungs 4 times daily as needed for Wheezing 18 g 0    busPIRone (BUSPAR) 5 MG tablet Take 1 or 2 tablets tid  tablet 1    lisinopril-hydroCHLOROthiazide (PRINZIDE;ZESTORETIC) 20-12.5 MG per tablet TAKE 1 TABLET DAILY 90 tablet 3    omeprazole (PRILOSEC) 40 MG delayed release capsule Take 1 capsule by mouth every morning (before breakfast) 30 capsule 5    Multiple Vitamin (MULTI-DAY) TABS Take 1 tablet by mouth daily      atorvastatin (LIPITOR) 20 MG tablet Take 1 tablet by mouth daily 90 tablet 1    naratriptan (AMERGE) 2.5 MG tablet Take 1 tablet by mouth once as needed for Migraine 2.5 mg at onset of headache, may repeat in 4 hours if needed 9 tablet 2     No current facility-administered medications for this visit. No Known Allergies    Subjective:      Review of Systems   Constitutional: Negative for chills, diaphoresis and fever. HENT: Positive for postnasal drip. Negative for congestion and rhinorrhea. Respiratory: Positive for cough (dry) and shortness of breath. Musculoskeletal: Positive for myalgias. Neurological: Positive for headaches.        Objective:     /78   Pulse 95   Ht 5' 4\" (1.626 m)   Wt 201 lb 12.8 oz (91.5 kg)   SpO2 97%   BMI 34.64 kg/m² Physical Exam  Vitals and nursing note reviewed. Constitutional:       Appearance: Normal appearance. Cardiovascular:      Rate and Rhythm: Normal rate and regular rhythm. Heart sounds: Normal heart sounds. Pulmonary:      Breath sounds: Normal breath sounds. No wheezing, rhonchi or rales. Comments: Coughing a lot  Neurological:      Mental Status: She is alert. Psychiatric:         Mood and Affect: Mood is anxious. Affect is tearful. Assessment:       Diagnosis Orders   1. Cough  CBC with Auto Differential   2. Shortness of breath  D-Dimer, Quantitative    Full PFT Study With Bronchodilator   3. Pain of left lower extremity  D-Dimer, Quantitative    VL Extremity Venous Left    Magnesium   4. Essential hypertension     5. Dyslipidemia  Lipid Panel    Comprehensive Metabolic Panel, Fasting   6. Elevated lipase  Lipase        Plan:    Venous doppler on left leg   BW ordered  If normal, have PFT  Has cough med      Return in about 2 weeks (around 6/20/2022), or if symptoms worsen or fail to improve, for with me. Orders Placed This Encounter   Procedures    VL Extremity Venous Left     Standing Status:   Future     Standing Expiration Date:   6/6/2023     Order Specific Question:   Upper or Lower Extremity?      Answer:   Lower Extremity     Order Specific Question:   Reason for exam:     Answer:   rule out DVT    D-Dimer, Quantitative     Standing Status:   Future     Number of Occurrences:   1     Standing Expiration Date:   6/6/2023    CBC with Auto Differential     Standing Status:   Future     Number of Occurrences:   1     Standing Expiration Date:   6/6/2023    Lipid Panel     Standing Status:   Future     Number of Occurrences:   1     Standing Expiration Date:   6/6/2023     Order Specific Question:   Is Patient Fasting?/# of Hours     Answer:   10-12 hours    Comprehensive Metabolic Panel, Fasting     Standing Status:   Future     Number of Occurrences:   1     Standing Expiration Date:   6/6/2023    Lipase     Standing Status:   Future     Number of Occurrences:   1     Standing Expiration Date:   6/6/2023    Magnesium     Standing Status:   Future     Number of Occurrences:   1     Standing Expiration Date:   6/6/2023    Full PFT Study With Bronchodilator     Standing Status:   Future     Number of Occurrences:   1     Standing Expiration Date:   10/4/2022     Scheduling Instructions: This is to be done at the hospital     No orders of the defined types were placed in this encounter.           Electronically signed by Padmini Salgado 7/5/2022 at 1:01 PM

## 2022-06-07 ENCOUNTER — APPOINTMENT (OUTPATIENT)
Dept: CT IMAGING | Age: 47
End: 2022-06-07
Payer: COMMERCIAL

## 2022-06-07 ENCOUNTER — HOSPITAL ENCOUNTER (EMERGENCY)
Age: 47
Discharge: HOME | End: 2022-06-07
Attending: STUDENT IN AN ORGANIZED HEALTH CARE EDUCATION/TRAINING PROGRAM
Payer: COMMERCIAL

## 2022-06-07 ENCOUNTER — APPOINTMENT (OUTPATIENT)
Dept: GENERAL RADIOLOGY | Age: 47
End: 2022-06-07
Payer: COMMERCIAL

## 2022-06-07 VITALS
TEMPERATURE: 98.4 F | HEART RATE: 84 BPM | OXYGEN SATURATION: 97 % | DIASTOLIC BLOOD PRESSURE: 73 MMHG | SYSTOLIC BLOOD PRESSURE: 142 MMHG | RESPIRATION RATE: 13 BRPM

## 2022-06-07 DIAGNOSIS — R07.9 CHEST PAIN, UNSPECIFIED TYPE: ICD-10-CM

## 2022-06-07 DIAGNOSIS — R06.02 SHORTNESS OF BREATH: Primary | ICD-10-CM

## 2022-06-07 DIAGNOSIS — E78.2 MIXED HYPERLIPIDEMIA: Primary | ICD-10-CM

## 2022-06-07 LAB
ABSOLUTE EOS #: 0 K/UL (ref 0–0.4)
ABSOLUTE LYMPH #: 2.3 K/UL (ref 1–4.8)
ABSOLUTE MONO #: 0.4 K/UL (ref 0.1–1.3)
ANION GAP SERPL CALCULATED.3IONS-SCNC: 13 MMOL/L (ref 9–17)
BASOPHILS # BLD: 0 % (ref 0–2)
BASOPHILS ABSOLUTE: 0 K/UL (ref 0–0.2)
BUN BLDV-MCNC: 15 MG/DL (ref 6–20)
CALCIUM SERPL-MCNC: 9.3 MG/DL (ref 8.6–10.4)
CHLORIDE BLD-SCNC: 99 MMOL/L (ref 98–107)
CO2: 25 MMOL/L (ref 20–31)
CREAT SERPL-MCNC: 0.9 MG/DL (ref 0.5–0.9)
D-DIMER QUANTITATIVE: 0.52 MG/L FEU (ref 0–0.59)
EOSINOPHILS RELATIVE PERCENT: 1 % (ref 0–4)
GFR AFRICAN AMERICAN: >60 ML/MIN
GFR NON-AFRICAN AMERICAN: >60 ML/MIN
GFR SERPL CREATININE-BSD FRML MDRD: ABNORMAL ML/MIN/{1.73_M2}
GLUCOSE BLD-MCNC: 115 MG/DL (ref 70–99)
HCT VFR BLD CALC: 38.4 % (ref 36–46)
HEMOGLOBIN: 13.8 G/DL (ref 12–16)
LYMPHOCYTES # BLD: 31 % (ref 24–44)
MAGNESIUM: 2 MG/DL (ref 1.6–2.6)
MCH RBC QN AUTO: 30.2 PG (ref 26–34)
MCHC RBC AUTO-ENTMCNC: 35.9 G/DL (ref 31–37)
MCV RBC AUTO: 84.2 FL (ref 80–100)
MONOCYTES # BLD: 6 % (ref 1–7)
PDW BLD-RTO: 13.3 % (ref 11.5–14.9)
PLATELET # BLD: 252 K/UL (ref 150–450)
PMV BLD AUTO: 7.9 FL (ref 6–12)
POTASSIUM SERPL-SCNC: 3.4 MMOL/L (ref 3.7–5.3)
RBC # BLD: 4.56 M/UL (ref 4–5.2)
REASON FOR REJECTION: NORMAL
SEG NEUTROPHILS: 62 % (ref 36–66)
SEGMENTED NEUTROPHILS ABSOLUTE COUNT: 4.5 K/UL (ref 1.3–9.1)
SODIUM BLD-SCNC: 137 MMOL/L (ref 135–144)
TROPONIN, HIGH SENSITIVITY: <6 NG/L (ref 0–14)
TROPONIN, HIGH SENSITIVITY: <6 NG/L (ref 0–14)
WBC # BLD: 7.2 K/UL (ref 3.5–11)
ZZ NTE CLEAN UP: ORDERED TEST: NORMAL
ZZ NTE WITH NAME CLEAN UP: SPECIMEN SOURCE: NORMAL

## 2022-06-07 PROCEDURE — 71260 CT THORAX DX C+: CPT

## 2022-06-07 PROCEDURE — 80048 BASIC METABOLIC PNL TOTAL CA: CPT

## 2022-06-07 PROCEDURE — 71046 X-RAY EXAM CHEST 2 VIEWS: CPT

## 2022-06-07 PROCEDURE — 6360000004 HC RX CONTRAST MEDICATION: Performed by: STUDENT IN AN ORGANIZED HEALTH CARE EDUCATION/TRAINING PROGRAM

## 2022-06-07 PROCEDURE — 83735 ASSAY OF MAGNESIUM: CPT

## 2022-06-07 PROCEDURE — 84484 ASSAY OF TROPONIN QUANT: CPT

## 2022-06-07 PROCEDURE — 93005 ELECTROCARDIOGRAM TRACING: CPT | Performed by: STUDENT IN AN ORGANIZED HEALTH CARE EDUCATION/TRAINING PROGRAM

## 2022-06-07 PROCEDURE — 99285 EMERGENCY DEPT VISIT HI MDM: CPT

## 2022-06-07 PROCEDURE — 6370000000 HC RX 637 (ALT 250 FOR IP): Performed by: STUDENT IN AN ORGANIZED HEALTH CARE EDUCATION/TRAINING PROGRAM

## 2022-06-07 PROCEDURE — 85025 COMPLETE CBC W/AUTO DIFF WBC: CPT

## 2022-06-07 PROCEDURE — 85379 FIBRIN DEGRADATION QUANT: CPT

## 2022-06-07 PROCEDURE — 2580000003 HC RX 258: Performed by: STUDENT IN AN ORGANIZED HEALTH CARE EDUCATION/TRAINING PROGRAM

## 2022-06-07 PROCEDURE — 36415 COLL VENOUS BLD VENIPUNCTURE: CPT

## 2022-06-07 RX ORDER — SODIUM CHLORIDE 0.9 % (FLUSH) 0.9 %
10 SYRINGE (ML) INJECTION PRN
Status: DISCONTINUED | OUTPATIENT
Start: 2022-06-07 | End: 2022-06-08 | Stop reason: HOSPADM

## 2022-06-07 RX ORDER — ASPIRIN 81 MG/1
223 TABLET, CHEWABLE ORAL ONCE
Status: COMPLETED | OUTPATIENT
Start: 2022-06-07 | End: 2022-06-07

## 2022-06-07 RX ORDER — 0.9 % SODIUM CHLORIDE 0.9 %
80 INTRAVENOUS SOLUTION INTRAVENOUS ONCE
Status: COMPLETED | OUTPATIENT
Start: 2022-06-07 | End: 2022-06-07

## 2022-06-07 RX ORDER — PREDNISONE 20 MG/1
20 TABLET ORAL 2 TIMES DAILY
Qty: 10 TABLET | Refills: 0 | Status: SHIPPED | OUTPATIENT
Start: 2022-06-07 | End: 2022-06-12

## 2022-06-07 RX ORDER — ATORVASTATIN CALCIUM 20 MG/1
20 TABLET, FILM COATED ORAL DAILY
Qty: 90 TABLET | Refills: 1 | Status: SHIPPED | OUTPATIENT
Start: 2022-06-07

## 2022-06-07 RX ADMIN — IOPAMIDOL 75 ML: 755 INJECTION, SOLUTION INTRAVENOUS at 19:56

## 2022-06-07 RX ADMIN — ASPIRIN 243 MG: 81 TABLET, CHEWABLE ORAL at 18:26

## 2022-06-07 RX ADMIN — SODIUM CHLORIDE 80 ML: 9 INJECTION, SOLUTION INTRAVENOUS at 19:57

## 2022-06-07 RX ADMIN — SODIUM CHLORIDE, PRESERVATIVE FREE 10 ML: 5 INJECTION INTRAVENOUS at 19:56

## 2022-06-07 NOTE — ED TRIAGE NOTES
Mode of arrival (squad #, walk in, police, etc) : walk in        Chief complaint(s): Fatigue, SOB        Arrival Note (brief scenario, treatment PTA, etc). : Pt states she has been fighting bronchitis since may. Today pt states she developed after shopping and states she feels weird. C= \"Have you ever felt that you should Cut down on your drinking? \"  No  A= \"Have people Annoyed you by criticizing your drinking? \"  No  G= \"Have you ever felt bad or Guilty about your drinking? \"  No  E= \"Have you ever had a drink as an Eye-opener first thing in the morning to steady your nerves or to help a hangover? \"  No      Deferred []      Reason for deferring: N/A    *If yes to two or more: probable alcohol abuse. *

## 2022-06-07 NOTE — ED PROVIDER NOTES
[address]  Emergency Department Encounter  Emergency Medicine Resident     Pt Name: Conor Hartley  LMQ:[id number]  [place] 1975  Date of evaluation: 22  PCP:  Oksar Azevedo PA-C    CHIEF COMPLAINT       Chief Complaint   Patient presents with    Fatigue    Cough    Dizziness     HISTORY OF PRESENT ILLNESS  (Location/Symptom, Timing/Onset, Context/Setting, Quality, Duration, ModifyingFactors, Severity.)      Conor Hartley is a 52 y.o. female with PMH of hypertension, hyperlipidemia, uterine leiomyoma who presents for evaluation of chest pain, shortness of breath. Patient reports episode of bronchitis 3 weeks ago and has been dealing with shortness of breath and dry cough since that time, did not complete Z-Robert/prednisone burst.  Patient is following with her PCP and has outpatient pulmonary function test scheduled regarding this. Today patient was at the grocery store when she had an episode of chest pain and tingling in her left arm. No fever, headache, changes in vision, abdominal pain, nausea, vomiting, diarrhea, urinary symptoms. Patient has not had similar episodes previously. Cardiac risk factors include hypertension/hyperlipidemia. No history of blood clot or cancer. No recent travel or surgery. No hormone use    PAST MEDICAL / SURGICAL / SOCIAL /FAMILY HISTORY      has a past medical history of Herpes labialis, Hyperlipidemia, Hypertension, and Uterine leiomyoma. No other pertinent PMH on review with patient/guardian. has a past surgical history that includes  section; Hysterectomy (2018); and laparoscopy. No other pertinent PSH on review with patient/guardian.   Social History     Socioeconomic History    Marital status:      Spouse name: Not on file    Number of children: Not on file    Years of education: Not on file    Highest education level: Not on file   Occupational History    Not on file   Tobacco Use    Smoking status: Never Smoker    Smokeless tobacco: Never Used   Substance and Sexual Activity    Alcohol use: Yes     Alcohol/week: 0.0 standard drinks    Drug use: No    Sexual activity: Not on file   Other Topics Concern    Not on file   Social History Narrative    Not on file     Social Determinants of Health     Financial Resource Strain:     Difficulty of Paying Living Expenses: Not on file   Food Insecurity:     Worried About Running Out of Food in the Last Year: Not on file    Esau of Food in the Last Year: Not on file   Transportation Needs:     Lack of Transportation (Medical): Not on file    Lack of Transportation (Non-Medical): Not on file   Physical Activity:     Days of Exercise per Week: Not on file    Minutes of Exercise per Session: Not on file   Stress:     Feeling of Stress : Not on file   Social Connections:     Frequency of Communication with Friends and Family: Not on file    Frequency of Social Gatherings with Friends and Family: Not on file    Attends Confucianist Services: Not on file    Active Member of [address] [place] [address] [id number] or Organizations: Not on file    Attends Club or Organization Meetings: Not on file    Marital Status: Not on file   Intimate Partner Violence:     Fear of Current or Ex-Partner: Not on file    Emotionally Abused: Not on file    Physically Abused: Not on file    Sexually Abused: Not on file   Housing Stability:     Unable to Pay for Housing in the Last Year: Not on file    Number of [place] in the Last Year: Not on file    Unstable Housing in the Last Year: Not on file       I counseled the patient against using tobacco products.     Family History   Problem Relation Age of Onset    Other Mother         Cluster Headaches; Fibromyalgia    Heart Disease Mother     High Blood Pressure Mother     Depression Mother     Diabetes Mother     Stroke Mother     Coronary Art Dis Mother     Other Father         Alcoholism, COPD    Other Sister         Ovarian Neoplasm    Cancer Maternal Grandmother         breast    Heart Disease Maternal Grandfather     Cancer Paternal Grandmother         breast    Stroke Paternal Grandmother     Arthritis Paternal Grandmother     Cancer Paternal Grandfather         lung     No other pertinent FamHx on review with patient/guardian. Allergies:  Patient has no known allergies. Home Medications:  Prior to Admission medications    Medication Sig Start Date End Date Taking? Authorizing Provider   predniSONE (DELTASONE) 20 MG tablet Take 1 tablet by mouth 2 times daily for 5 days 6/7/22 6/12/22  Jd Fontana PA-C   atorvastatin (LIPITOR) 20 MG tablet Take 1 tablet by mouth daily 6/7/22   Jd Fontana PA-C   albuterol sulfate HFA (VENTOLIN HFA) 108 (90 Base) MCG/ACT inhaler Inhale 2 puffs into the lungs 4 times daily as needed for Wheezing 5/20/22   Jd Fontana PA-C   busPIRone (BUSPAR) 5 MG tablet Take 1 or 2 tablets tid PRN 5/5/22   Jd Fontana PA-C   lisinopril-hydroCHLOROthiazide (PRINZIDE;ZESTORETIC) 20-12.5 MG per tablet TAKE 1 TABLET DAILY 12/7/21   Jd Fontana PA-C   naratriptan (AMERGE) 2.5 MG tablet Take 1 tablet by mouth once as needed for Migraine 2.5 mg at onset of headache, may repeat in 4 hours if needed 10/7/21 10/7/21  Jd Fontana PA-C   omeprazole (PRILOSEC) 40 MG delayed release capsule Take 1 capsule by mouth every morning (before breakfast) 7/19/21   Jd Fontana PA-C   Multiple Vitamin (MULTI-DAY) TABS Take 1 tablet by mouth daily    Jd Fontana PA-C       REVIEW OF SYSTEMS    (2-9 systems for level 4, 10 ormore for level 5)      Review of Systems   Constitutional: Negative for fever. Eyes: Negative for visual disturbance. Respiratory: Positive for cough and shortness of breath. Cardiovascular: Positive for chest pain. Negative for palpitations and leg swelling. Gastrointestinal: Negative for abdominal pain, nausea and vomiting.    Skin: Negative for rash.   Allergic/Immunologic: Negative for immunocompromised state. Neurological: Negative for headaches. Hematological: Does not bruise/bleed easily. PHYSICAL EXAM   (up to 7 for level 4, 8 or more for level 5)      INITIAL VITALS:   BP (!) 142/73   Pulse 84   Temp 98.4 °F (36.9 °C)   Resp 13   SpO2 97%     Physical Exam  Constitutional:       General: She is not in acute distress. Appearance: Normal appearance. She is not ill-appearing, toxic-appearing or diaphoretic. HENT:      Head: Normocephalic and atraumatic. Right Ear: External ear normal.      Left Ear: External ear normal.   Eyes:      General:         Right eye: No discharge. Left eye: No discharge. Cardiovascular:      Rate and Rhythm: Normal rate and regular rhythm. Pulses: Normal pulses. Heart sounds: No murmur heard. Pulmonary:      Effort: Pulmonary effort is normal. No respiratory distress. Breath sounds: Normal breath sounds. No wheezing, rhonchi or rales. Abdominal:      Palpations: Abdomen is soft. Tenderness: There is no abdominal tenderness. Musculoskeletal:      Right lower leg: No edema. Left lower leg: No edema. Skin:     Capillary Refill: Capillary refill takes less than 2 seconds. Neurological:      General: No focal deficit present. Mental Status: She is alert. Comments: 5/5 strength BUE/BLE.   Sensation intact       DIFFERENTIAL  DIAGNOSIS     PLAN (LABS / IMAGING / EKG):  Orders Placed This Encounter   Procedures    XR CHEST (2 VW)    CT CHEST PULMONARY EMBOLISM W CONTRAST    CBC with Auto Differential    Basic Metabolic Panel    Magnesium    D-Dimer, Quantitative    Troponin    SPECIMEN REJECTION    Troponin    EKG 12 Lead       MEDICATIONS ORDERED:  Orders Placed This Encounter   Medications    aspirin chewable tablet 243 mg    0.9 % sodium chloride bolus    DISCONTD: sodium chloride flush 0.9 % injection 10 mL    iopamidol (ISOVUE-370) 76 % injection 75 mL       DIAGNOSTIC RESULTS / EMERGENCY DEPARTMENT COURSE / MDM     LABS:  Results for orders placed or performed during the hospital encounter of 06/07/22   CBC with Auto Differential   Result Value Ref Range    WBC 7.2 3.5 - 11.0 k/uL    RBC 4.56 4.0 - 5.2 m/uL    Hemoglobin 13.8 12.0 - 16.0 g/dL    Hematocrit 38.4 36 - 46 %    MCV 84.2 80 - 100 fL    MCH 30.2 26 - 34 pg    MCHC 35.9 31 - 37 g/dL    RDW 13.3 11.5 - 14.9 %    Platelets 949 733 - 579 k/uL    MPV 7.9 6.0 - 12.0 fL    Seg Neutrophils 62 36 - 66 %    Lymphocytes 31 24 - 44 %    Monocytes 6 1 - 7 %    Eosinophils % 1 0 - 4 %    Basophils 0 0 - 2 %    Segs Absolute 4.50 1.3 - 9.1 k/uL    Absolute Lymph # 2.30 1.0 - 4.8 k/uL    Absolute Mono # 0.40 0.1 - 1.3 k/uL    Absolute Eos # 0.00 0.0 - 0.4 k/uL    Basophils Absolute 0.00 0.0 - 0.2 k/uL   Basic Metabolic Panel   Result Value Ref Range    Glucose 115 (H) 70 - 99 mg/dL    BUN 15 6 - 20 mg/dL    CREATININE 0.90 0.50 - 0.90 mg/dL    Calcium 9.3 8.6 - 10.4 mg/dL    Sodium 137 135 - 144 mmol/L    Potassium 3.4 (L) 3.7 - 5.3 mmol/L    Chloride 99 98 - 107 mmol/L    CO2 25 20 - 31 mmol/L    Anion Gap 13 9 - 17 mmol/L    GFR Non-African American >60 >60 mL/min    GFR African American >60 >60 mL/min    GFR Comment         Magnesium   Result Value Ref Range    Magnesium 2.0 1.6 - 2.6 mg/dL   D-Dimer, Quantitative   Result Value Ref Range    D-Dimer, Quant 0.52 0.00 - 0.59 mg/L FEU   Troponin   Result Value Ref Range    Troponin, High Sensitivity <6 0 - 14 ng/L   SPECIMEN REJECTION   Result Value Ref Range    Specimen Source . BLOOD     Ordered Test TROPI     Reason for Rejection Unable to perform testing: Specimen hemolyzed.     Troponin   Result Value Ref Range    Troponin, High Sensitivity <6 0 - 14 ng/L   EKG 12 Lead   Result Value Ref Range    Ventricular Rate 88 BPM    Atrial Rate 88 BPM    P-R Interval 144 ms    QRS Duration 70 ms    Q-T Interval 344 ms    QTc Calculation (Bazett) 416 ms    P Axis 48 degrees    R Axis -9 degrees    T Axis -8 degrees       IMPRESSION/MDM/ED COURSE:  52 y.o. female presented with cough, shortness of breath x3 weeks with episode of chest pain/left arm tingling prior to arrival.  Patient borderline tachycardic at 103 but Vital signs otherwise unremarkable. On exam patient resting company no acute distress, nontoxic-appearing. Heart RRR, lungs clear. Abdomen soft nontender. No focal neurologic deficits. Will obtain full cardiac work-up including D-dimer due to tachycardia. I did not order a pregnancy test because patient has history of hysterectomy. ED Course as of 06/08/22 1536   Tue Jun 07, 2022   6396 Troponin:    Troponin, High Sensitivity <6 [AF]   1852 CBC with Auto Differential:    WBC 7.2   RBC 4.56   Hemoglobin Quant 13.8   Hematocrit 38.4   MCV 84.2   MCH 30.2   MCHC 35.9   RDW 13.3   Platelet Count 990   MPV 7.9   Seg Neutrophils 62   Lymphocytes 31   Monocytes 6   Eosinophils % 1   Basophils 0   Segs Absolute 4.50   Absolute Lymph # 2.30   Absolute Mono # 0.40   Absolute Eos # 0.00   Basophils Absolute 0.00 [AF]   1903 D-Dimer, Quantitative:    D-Dimer, Quant 0.52  Will obtain CT PE [AF]   1852 Magnesium:    Magnesium 2.0 [AF]   2112 IMPRESSION:  1. No acute pulmonary embolism. 2. No acute pulmonary disease. 3. Small hiatal hernia. 4. Hepatic steatosis. [AF]   4832 Troponin:    Troponin, High Sensitivity <6 [AF]   2308 CT PE negative except for incidental finding of hepatic steatosis and small hiatal hernia which was discussed with patient. No PE. Will patient follow-up with PCP for pulmonary function test and continued work-up. Low risk via heart score. I discussed signs and symptoms that would require reevaluation in the ED. The patient expressed understanding and agreement with plan. All questions answered. [AF]      ED Course User Index  [AF] Barrie Boyd DO     Heart score for ACS = 3  1.  History - 0  Slightly suspicious: 0  Moderately suspicious: 1  Highly suspicious: 2  2. EKG - 1  Normal: 0  Non-specific repolarization disturbance:1  Significant ST depression: 2  3. Age - 1  <45: 0  45-64: 1  >65: 2  4. Risk Factors - 1  None known: 0  1-2: 1  >3: 2  5. Initial Troponin - 0  Normal limit: 0  1-3 x normal limit: 1  >3 x normal limit: 2      RADIOLOGY:  CT CHEST PULMONARY EMBOLISM W CONTRAST   Final Result   1. No acute pulmonary embolism. 2. No acute pulmonary disease. 3. Small hiatal hernia. 4. Hepatic steatosis. XR CHEST (2 VW)   Final Result   No acute pulmonary process. EKG  Normal sinus rhythm. Normal axis. No ST elevation or depression. Nonspecific T wave changes unchanged from previous EKG. Impression: Unchanged EKG    All EKG's are interpreted by the Emergency Department Physician who either signs or Co-signs this chart in the absence of a cardiologist.    PROCEDURES:  None    CONSULTS:  None    FINAL IMPRESSION      1. Shortness of breath    2.  Chest pain, unspecified type          DISPOSITION / PLAN     DISPOSITION Decision To Discharge 06/07/2022 11:09:19 PM      PATIENT REFERREDTO:  Darron Duverney, PA-C  [address]  [phone]    Schedule an appointment as soon as possible for a visit         DISCHARGE MEDICATIONS:  Discharge Medication List as of 6/7/2022 11:10 PM        Marcella Gomez DO  PGY 2  Resident Physician Emergency Medicine  06/08/22 3:36 PM    (Please note that portions of this note were completed with a voice recognition program.Efforts were made to edit the dictations but occasionally words are mis-transcribed.)     Elfego Lynn DO  Resident  06/08/22 [id number] [employer] [place], DO  Resident  06/08/22 1536

## 2022-06-07 NOTE — ED PROVIDER NOTES
EMERGENCY DEPARTMENT ENCOUNTER   ATTENDING ATTESTATION     Pt Name: Merary Kang  MRN: [id number]  [place] 1975  Date of evaluation: 6/7/22       Merary Kang is a 52 y.o. female who presents with Fatigue, Cough, and Dizziness      MDM:     52year-old presents with coughing and shortness of breath. CBC and electrolytes normal. D dimer mildly elevated. Obtain CT PE.     CT normal. Discharge COPD treatments. Vitals:   Vitals:    06/07/22 1728 06/07/22 1856   BP: (!) 123/59 (!) 142/73   Pulse: (!) 103 84   Resp: 16 13   Temp: 98.4 °F (36.9 °C)    SpO2: 98% 97%         I personally saw and examined the patient. I have reviewed and agree with the resident's findings, including all diagnostic interpretations and treatment plan as written. I was present for the key portions of any procedures performed and the inclusive time noted for any critical care statement. The care is provided during an unprecedented national emergency due to the novel coronavirus, COVID 19.   Gayle Rosales MD  Attending Emergency Physician          Gayle Rosales MD  06/07/22 7246

## 2022-06-08 LAB
EKG ATRIAL RATE: 88 BPM
EKG P AXIS: 48 DEGREES
EKG P-R INTERVAL: 144 MS
EKG Q-T INTERVAL: 344 MS
EKG QRS DURATION: 70 MS
EKG QTC CALCULATION (BAZETT): 416 MS
EKG R AXIS: -9 DEGREES
EKG T AXIS: -8 DEGREES
EKG VENTRICULAR RATE: 88 BPM

## 2022-06-08 PROCEDURE — 93010 ELECTROCARDIOGRAM REPORT: CPT | Performed by: INTERNAL MEDICINE

## 2022-06-08 ASSESSMENT — ENCOUNTER SYMPTOMS
COUGH: 1
NAUSEA: 0
VOMITING: 0
ABDOMINAL PAIN: 0
SHORTNESS OF BREATH: 1

## 2022-06-14 ENCOUNTER — HOSPITAL ENCOUNTER (OUTPATIENT)
Dept: PULMONOLOGY | Age: 47
Discharge: HOME | End: 2022-06-14
Payer: COMMERCIAL

## 2022-06-14 ENCOUNTER — HOSPITAL ENCOUNTER (OUTPATIENT)
Dept: WOMENS IMAGING | Age: 47
Discharge: HOME | End: 2022-06-16
Payer: COMMERCIAL

## 2022-06-14 DIAGNOSIS — R06.02 SHORTNESS OF BREATH: ICD-10-CM

## 2022-06-14 DIAGNOSIS — Z12.31 VISIT FOR SCREENING MAMMOGRAM: ICD-10-CM

## 2022-06-14 LAB
DLCO %PRED: NORMAL
DLCO PRED: NORMAL
DLCO/VA %PRED: NORMAL
DLCO/VA PRED: NORMAL
DLCO/VA: NORMAL
DLCO: NORMAL
EXPIRATORY TIME: NORMAL
FEF 25-75% %PRED-PRE: NORMAL
FEF 25-75% PRED: NORMAL
FEF 25-75%-PRE: NORMAL
FEV1 %PRED-PRE: NORMAL
FEV1 PRED: NORMAL
FEV1/FVC %PRED-PRE: NORMAL
FEV1/FVC PRED: NORMAL
FEV1/FVC: NORMAL
FEV1: NORMAL
FVC %PRED-PRE: NORMAL
FVC PRED: NORMAL
FVC: NORMAL
GAW %PRED: NORMAL
GAW PRED: NORMAL
GAW: NORMAL
IC %PRED: NORMAL
IC PRED: NORMAL
IC: NORMAL
MVV %PRED-PRE: NORMAL
MVV PRED: NORMAL
MVV-PRE: NORMAL
PEF %PRED-PRE: NORMAL
PEF PRED: NORMAL
PEF-PRE: NORMAL
RAW %PRED: NORMAL
RAW PRED: NORMAL
RAW: NORMAL
RV %PRED: NORMAL
RV PRED: NORMAL
RV: NORMAL
SVC %PRED: NORMAL
SVC PRED: NORMAL
SVC: NORMAL
TLC %PRED: NORMAL
TLC PRED: NORMAL
TLC: NORMAL
VA %PRED: NORMAL
VA PRED: NORMAL
VA: NORMAL
VTG %PRED: NORMAL
VTG PRED: NORMAL
VTG: NORMAL

## 2022-06-14 PROCEDURE — 94726 PLETHYSMOGRAPHY LUNG VOLUMES: CPT

## 2022-06-14 PROCEDURE — 94664 DEMO&/EVAL PT USE INHALER: CPT

## 2022-06-14 PROCEDURE — 94729 DIFFUSING CAPACITY: CPT

## 2022-06-14 PROCEDURE — 94375 RESPIRATORY FLOW VOLUME LOOP: CPT

## 2022-06-14 PROCEDURE — 77063 BREAST TOMOSYNTHESIS BI: CPT

## 2022-06-14 PROCEDURE — 94060 EVALUATION OF WHEEZING: CPT

## 2022-06-14 NOTE — PROCEDURES
PFT Interpretation:    Normal Lung Mechanics, Volumes,and Diffusion Capacity is noted. Evidence of air trapping / hyperinflation is not noted. No Significant improvement is noted after bronchodilators.     Gustvao Jiménez MD

## 2022-06-15 NOTE — RESULT ENCOUNTER NOTE
Your recent test result (pulmonary function test) is normal. Please make sure you schedule a f/u appt after all your tests are done to discuss next steps.

## 2022-07-05 ENCOUNTER — OFFICE VISIT (OUTPATIENT)
Dept: PRIMARY CARE CLINIC | Age: 47
End: 2022-07-05
Payer: COMMERCIAL

## 2022-07-05 VITALS
BODY MASS INDEX: 34.11 KG/M2 | SYSTOLIC BLOOD PRESSURE: 140 MMHG | HEIGHT: 64 IN | DIASTOLIC BLOOD PRESSURE: 82 MMHG | HEART RATE: 101 BPM | OXYGEN SATURATION: 97 % | WEIGHT: 199.8 LBS

## 2022-07-05 DIAGNOSIS — F41.9 ANXIETY: ICD-10-CM

## 2022-07-05 DIAGNOSIS — E78.5 DYSLIPIDEMIA: ICD-10-CM

## 2022-07-05 DIAGNOSIS — R51.9 LEFT TEMPORAL HEADACHE: ICD-10-CM

## 2022-07-05 DIAGNOSIS — R06.02 SHORTNESS OF BREATH: ICD-10-CM

## 2022-07-05 DIAGNOSIS — M79.10 MYALGIA: ICD-10-CM

## 2022-07-05 DIAGNOSIS — R10.9 FLANK PAIN: ICD-10-CM

## 2022-07-05 DIAGNOSIS — R51.9 LEFT TEMPORAL HEADACHE: Primary | ICD-10-CM

## 2022-07-05 DIAGNOSIS — I10 ESSENTIAL HYPERTENSION: ICD-10-CM

## 2022-07-05 LAB
ABSOLUTE EOS #: 0.04 K/UL (ref 0–0.44)
ABSOLUTE IMMATURE GRANULOCYTE: <0.03 K/UL (ref 0–0.3)
ABSOLUTE LYMPH #: 2 K/UL (ref 1.1–3.7)
ABSOLUTE MONO #: 0.38 K/UL (ref 0.1–1.2)
ANION GAP SERPL CALCULATED.3IONS-SCNC: 10 MMOL/L (ref 9–17)
BASOPHILS # BLD: 1 % (ref 0–2)
BASOPHILS ABSOLUTE: 0.04 K/UL (ref 0–0.2)
BILIRUBIN, POC: NORMAL
BLOOD URINE, POC: NORMAL
BUN BLDV-MCNC: 15 MG/DL (ref 6–20)
CALCIUM SERPL-MCNC: 9.2 MG/DL (ref 8.6–10.4)
CHLORIDE BLD-SCNC: 104 MMOL/L (ref 98–107)
CLARITY, POC: NORMAL
CO2: 26 MMOL/L (ref 20–31)
COLOR, POC: NORMAL
CREAT SERPL-MCNC: 0.73 MG/DL (ref 0.5–0.9)
EOSINOPHILS RELATIVE PERCENT: 1 % (ref 1–4)
GFR AFRICAN AMERICAN: >60 ML/MIN
GFR NON-AFRICAN AMERICAN: >60 ML/MIN
GFR SERPL CREATININE-BSD FRML MDRD: ABNORMAL ML/MIN/{1.73_M2}
GLUCOSE BLD-MCNC: 103 MG/DL (ref 70–99)
GLUCOSE URINE, POC: NORMAL
HCT VFR BLD CALC: 41.5 % (ref 36.3–47.1)
HEMOGLOBIN: 14.2 G/DL (ref 11.9–15.1)
IMMATURE GRANULOCYTES: 0 %
KETONES, POC: NORMAL
LEUKOCYTE EST, POC: NORMAL
LYMPHOCYTES # BLD: 25 % (ref 24–43)
MCH RBC QN AUTO: 29.2 PG (ref 25.2–33.5)
MCHC RBC AUTO-ENTMCNC: 34.2 G/DL (ref 28.4–34.8)
MCV RBC AUTO: 85.4 FL (ref 82.6–102.9)
MONOCYTES # BLD: 5 % (ref 3–12)
NITRITE, POC: NORMAL
NRBC AUTOMATED: 0 PER 100 WBC
PDW BLD-RTO: 12.5 % (ref 11.8–14.4)
PH, POC: 5.5
PLATELET # BLD: 234 K/UL (ref 138–453)
PMV BLD AUTO: 10.3 FL (ref 8.1–13.5)
POTASSIUM SERPL-SCNC: 4 MMOL/L (ref 3.7–5.3)
PROTEIN, POC: 30
RBC # BLD: 4.86 M/UL (ref 3.95–5.11)
SEDIMENTATION RATE, ERYTHROCYTE: 19 MM/HR (ref 0–20)
SEG NEUTROPHILS: 68 % (ref 36–65)
SEGMENTED NEUTROPHILS ABSOLUTE COUNT: 5.47 K/UL (ref 1.5–8.1)
SODIUM BLD-SCNC: 140 MMOL/L (ref 135–144)
SPECIFIC GRAVITY, POC: 1.03
TOTAL CK: 89 U/L (ref 26–192)
UROBILINOGEN, POC: 0.2
WBC # BLD: 8 K/UL (ref 3.5–11.3)

## 2022-07-05 PROCEDURE — 99214 OFFICE O/P EST MOD 30 MIN: CPT | Performed by: PHYSICIAN ASSISTANT

## 2022-07-05 PROCEDURE — 81002 URINALYSIS NONAUTO W/O SCOPE: CPT | Performed by: PHYSICIAN ASSISTANT

## 2022-07-05 RX ORDER — BUPROPION HYDROCHLORIDE 150 MG/1
150 TABLET ORAL EVERY MORNING
Qty: 30 TABLET | Refills: 0 | Status: SHIPPED | OUTPATIENT
Start: 2022-07-05 | End: 2022-09-06

## 2022-07-05 SDOH — ECONOMIC STABILITY: FOOD INSECURITY: WITHIN THE PAST 12 MONTHS, YOU WORRIED THAT YOUR FOOD WOULD RUN OUT BEFORE YOU GOT MONEY TO BUY MORE.: NEVER TRUE

## 2022-07-05 SDOH — ECONOMIC STABILITY: FOOD INSECURITY: WITHIN THE PAST 12 MONTHS, THE FOOD YOU BOUGHT JUST DIDN'T LAST AND YOU DIDN'T HAVE MONEY TO GET MORE.: NEVER TRUE

## 2022-07-05 ASSESSMENT — ENCOUNTER SYMPTOMS
COUGH: 0
SHORTNESS OF BREATH: 1

## 2022-07-05 ASSESSMENT — SOCIAL DETERMINANTS OF HEALTH (SDOH): HOW HARD IS IT FOR YOU TO PAY FOR THE VERY BASICS LIKE FOOD, HOUSING, MEDICAL CARE, AND HEATING?: NOT HARD AT ALL

## 2022-07-05 NOTE — PROGRESS NOTES
[facility] PRIMARY [facility]ARE  [address] [place] [id number]  Dept: [phone]    Corinne Rodriguez is a 52 y.o. female who presents today for her medical conditions/complaints as noted below. Chief Complaint   Patient presents with    Discuss Labs     discuss PFT and lab work. . Patient said she started the Lipitor.  Loss of Vision     loss of vision in left eye a week ago after waking up from a nap. Patient said she lost vision for about 20 minutes. Patient said since then she has neck pain and HA.  Shortness of Breath     Patient went [facility] on 6/7/22 due to SOB, patient said all test came back fine but she is still having SOB       HPI:     HPI     PFT-normal  Labs: elevated cholesterol. She started the generic Lipitor but is having some left arm pain and left flank pain. Had one episode of loss of vision in left eye for 20 minutes now she is having neck pain and HAs. She saw pulsation in her left temple as well. Took Motrin, Tylenol, Maxalt, and Exedrine and nothing helps the HA. CT showed fatty liver and hiatal hernia    Constantly napping and when woke up she was very blurry in left eye. Next day had pressure in eye and talked to her eye doctor. Tried drops and a few days had pulsation in temple and now having HA and now neck pain. 2 days before all this started had a neck adjustment---really felt the adjustment on the right.     LDL Cholesterol (mg/dL)   Date Value   06/06/2022 192 (H)   07/30/2021 138 (H)   12/15/2020 164 (H)     LDL Calculated (mg/dL)   Date Value   11/03/2018 123   02/09/2017 180 (A)       (goal LDL is <100)   AST (U/L)   Date Value   06/06/2022 19     ALT (U/L)   Date Value   06/06/2022 20     BUN (mg/dL)   Date Value   06/07/2022 15     BP Readings from Last 3 Encounters:   07/05/22 (!) 140/82   06/07/22 (!) 142/73   06/06/22 130/78          (goal 120/80)    Past Medical History:   Diagnosis Date    Herpes Diagnosis Orders   1. Left temporal headache  Sedimentation Rate    External Referral To Ophthalmology   2. Shortness of breath  ECHO Complete 2D W Doppler W Color   3. Dyslipidemia     4. Essential hypertension     5. Myalgia  CK    Basic Metabolic Panel   6. Flank pain  CBC with Auto Differential    POCT Urinalysis no Micro   7. Anxiety  buPROPion (WELLBUTRIN XL) 150 MG extended release tablet        Plan:    Recheck BP  Reviewed ER records  REviewed labs and PFT  Urine today  BW ordered  Echo ordered  See ophthalmology  Start Wellbutrin  Continue Buspar      Return in about 1 month (around 8/5/2022) for Anxiety, Imaging results, Lab Results--1/2 hour.     Orders Placed This Encounter   Procedures    Sedimentation Rate     Standing Status:   Future     Number of Occurrences:   1     Standing Expiration Date:   7/5/2023    CK     Standing Status:   Future     Number of Occurrences:   1     Standing Expiration Date:   7/5/2023    CBC with Auto Differential     Standing Status:   Future     Number of Occurrences:   1     Standing Expiration Date:   7/5/2023    Basic Metabolic Panel     Standing Status:   Future     Number of Occurrences:   1     Standing Expiration Date:   7/5/2023    External Referral To Ophthalmology     Referral Priority:   Routine     Referral Type:   Eval and Treat     Referral Reason:   Specialty Services Required     Referred to Provider:   Carmen Mike, OD     Requested Specialty:   Ophthalmology     Number of Visits Requested:   1    POCT Urinalysis no Micro    ECHO Complete 2D W Doppler W Color     Standing Status:   Future     Standing Expiration Date:   7/5/2023     Order Specific Question:   Reason for exam:     Answer:   See above     Orders Placed This Encounter   Medications    buPROPion (WELLBUTRIN XL) 150 MG extended release tablet     Sig: Take 1 tablet by mouth every morning     Dispense:  30 tablet     Refill:  0       Patient given educational materials - see patient instructions. Discussed use, benefit, and side effects of prescribed medications. All patient questions answered. Pt voiced understanding. Reviewed health maintenance. Instructed to continue current medications, diet and exercise. Patient agreed with treatment plan. Follow up as directed.      Electronically signed by Bogdan Monk PA-C on 7/5/2022 at 3:06 PM

## 2022-07-08 ENCOUNTER — APPOINTMENT (OUTPATIENT)
Dept: CT IMAGING | Age: 47
End: 2022-07-08
Payer: COMMERCIAL

## 2022-07-08 ENCOUNTER — HOSPITAL ENCOUNTER (EMERGENCY)
Age: 47
Discharge: HOME | End: 2022-07-08
Attending: EMERGENCY MEDICINE
Payer: COMMERCIAL

## 2022-07-08 ENCOUNTER — TELEPHONE (OUTPATIENT)
Dept: PRIMARY CARE CLINIC | Age: 47
End: 2022-07-08

## 2022-07-08 VITALS
HEART RATE: 119 BPM | OXYGEN SATURATION: 95 % | SYSTOLIC BLOOD PRESSURE: 139 MMHG | TEMPERATURE: 97.8 F | BODY MASS INDEX: 33.97 KG/M2 | DIASTOLIC BLOOD PRESSURE: 73 MMHG | RESPIRATION RATE: 18 BRPM | HEIGHT: 64 IN | WEIGHT: 199 LBS

## 2022-07-08 DIAGNOSIS — G43.811 OTHER MIGRAINE WITH STATUS MIGRAINOSUS, INTRACTABLE: Primary | ICD-10-CM

## 2022-07-08 LAB
ABSOLUTE EOS #: 0 K/UL (ref 0–0.4)
ABSOLUTE LYMPH #: 1.6 K/UL (ref 1–4.8)
ABSOLUTE MONO #: 0.4 K/UL (ref 0.1–1.2)
ANION GAP SERPL CALCULATED.3IONS-SCNC: 11 MMOL/L (ref 9–17)
BASOPHILS # BLD: 1 % (ref 0–2)
BASOPHILS ABSOLUTE: 0 K/UL (ref 0–0.2)
BUN BLDV-MCNC: 12 MG/DL (ref 6–20)
CALCIUM SERPL-MCNC: 9.6 MG/DL (ref 8.6–10.4)
CHLORIDE BLD-SCNC: 99 MMOL/L (ref 98–107)
CO2: 27 MMOL/L (ref 20–31)
CREAT SERPL-MCNC: 0.7 MG/DL (ref 0.5–0.9)
EOSINOPHILS RELATIVE PERCENT: 0 % (ref 1–4)
GFR AFRICAN AMERICAN: >60 ML/MIN
GFR NON-AFRICAN AMERICAN: >60 ML/MIN
GFR SERPL CREATININE-BSD FRML MDRD: ABNORMAL ML/MIN/{1.73_M2}
GLUCOSE BLD-MCNC: 155 MG/DL (ref 70–99)
HCT VFR BLD CALC: 40.2 % (ref 36–46)
HEMOGLOBIN: 13.7 G/DL (ref 12–16)
LYMPHOCYTES # BLD: 23 % (ref 24–44)
MCH RBC QN AUTO: 29.5 PG (ref 26–34)
MCHC RBC AUTO-ENTMCNC: 34.2 G/DL (ref 31–37)
MCV RBC AUTO: 86.1 FL (ref 80–100)
MONOCYTES # BLD: 5 % (ref 2–11)
PDW BLD-RTO: 14 % (ref 12.5–15.4)
PLATELET # BLD: 225 K/UL (ref 140–450)
PMV BLD AUTO: 8 FL (ref 6–12)
POTASSIUM SERPL-SCNC: 3.5 MMOL/L (ref 3.7–5.3)
RBC # BLD: 4.66 M/UL (ref 4–5.2)
SEG NEUTROPHILS: 71 % (ref 36–66)
SEGMENTED NEUTROPHILS ABSOLUTE COUNT: 5 K/UL (ref 1.8–7.7)
SODIUM BLD-SCNC: 137 MMOL/L (ref 135–144)
WBC # BLD: 7 K/UL (ref 3.5–11)

## 2022-07-08 PROCEDURE — 99284 EMERGENCY DEPT VISIT MOD MDM: CPT

## 2022-07-08 PROCEDURE — 93005 ELECTROCARDIOGRAM TRACING: CPT | Performed by: EMERGENCY MEDICINE

## 2022-07-08 PROCEDURE — 80048 BASIC METABOLIC PNL TOTAL CA: CPT

## 2022-07-08 PROCEDURE — 96361 HYDRATE IV INFUSION ADD-ON: CPT

## 2022-07-08 PROCEDURE — 96375 TX/PRO/DX INJ NEW DRUG ADDON: CPT

## 2022-07-08 PROCEDURE — 70450 CT HEAD/BRAIN W/O DYE: CPT

## 2022-07-08 PROCEDURE — 2580000003 HC RX 258: Performed by: EMERGENCY MEDICINE

## 2022-07-08 PROCEDURE — 6360000002 HC RX W HCPCS: Performed by: EMERGENCY MEDICINE

## 2022-07-08 PROCEDURE — 96365 THER/PROPH/DIAG IV INF INIT: CPT

## 2022-07-08 PROCEDURE — 36415 COLL VENOUS BLD VENIPUNCTURE: CPT

## 2022-07-08 PROCEDURE — 96366 THER/PROPH/DIAG IV INF ADDON: CPT

## 2022-07-08 PROCEDURE — 85025 COMPLETE CBC W/AUTO DIFF WBC: CPT

## 2022-07-08 RX ORDER — ONDANSETRON 2 MG/ML
4 INJECTION INTRAMUSCULAR; INTRAVENOUS ONCE
Status: COMPLETED | OUTPATIENT
Start: 2022-07-08 | End: 2022-07-08

## 2022-07-08 RX ORDER — 0.9 % SODIUM CHLORIDE 0.9 %
1000 INTRAVENOUS SOLUTION INTRAVENOUS ONCE
Status: COMPLETED | OUTPATIENT
Start: 2022-07-08 | End: 2022-07-08

## 2022-07-08 RX ORDER — KETOROLAC TROMETHAMINE 15 MG/ML
15 INJECTION, SOLUTION INTRAMUSCULAR; INTRAVENOUS ONCE
Status: COMPLETED | OUTPATIENT
Start: 2022-07-08 | End: 2022-07-08

## 2022-07-08 RX ORDER — MAGNESIUM SULFATE IN WATER 40 MG/ML
2000 INJECTION, SOLUTION INTRAVENOUS ONCE
Status: COMPLETED | OUTPATIENT
Start: 2022-07-08 | End: 2022-07-08

## 2022-07-08 RX ORDER — ONDANSETRON 4 MG/1
4 TABLET, ORALLY DISINTEGRATING ORAL EVERY 8 HOURS PRN
Qty: 20 TABLET | Refills: 0 | Status: SHIPPED | OUTPATIENT
Start: 2022-07-08 | End: 2022-09-06 | Stop reason: SDUPTHER

## 2022-07-08 RX ORDER — DEXAMETHASONE SODIUM PHOSPHATE 10 MG/ML
10 INJECTION INTRAMUSCULAR; INTRAVENOUS ONCE
Status: COMPLETED | OUTPATIENT
Start: 2022-07-08 | End: 2022-07-08

## 2022-07-08 RX ADMIN — KETOROLAC TROMETHAMINE 15 MG: 15 INJECTION, SOLUTION INTRAMUSCULAR; INTRAVENOUS at 14:23

## 2022-07-08 RX ADMIN — MAGNESIUM SULFATE 2000 MG: 2 INJECTION INTRAVENOUS at 15:01

## 2022-07-08 RX ADMIN — SODIUM CHLORIDE 1000 ML: 9 INJECTION, SOLUTION INTRAVENOUS at 14:20

## 2022-07-08 RX ADMIN — ONDANSETRON 4 MG: 2 INJECTION INTRAMUSCULAR; INTRAVENOUS at 14:23

## 2022-07-08 RX ADMIN — DEXAMETHASONE SODIUM PHOSPHATE 10 MG: 10 INJECTION INTRAMUSCULAR; INTRAVENOUS at 14:23

## 2022-07-08 ASSESSMENT — PAIN - FUNCTIONAL ASSESSMENT: PAIN_FUNCTIONAL_ASSESSMENT: 0-10

## 2022-07-08 ASSESSMENT — PAIN DESCRIPTION - LOCATION: LOCATION: HEAD

## 2022-07-08 ASSESSMENT — PAIN SCALES - GENERAL
PAINLEVEL_OUTOF10: 5
PAINLEVEL_OUTOF10: 6

## 2022-07-08 NOTE — ED TRIAGE NOTES
Pt states she recently went to the chiropractor for a neck adjustment, lost vision in L eye for 20 min. Has since developed migraine with no improvement.

## 2022-07-08 NOTE — ED PROVIDER NOTES
[address] [facility]  [address] [facility] [address]  Phone: [phone]  Fax: Rhiannon Tavares 9471      Pt Name: Sharon Matos  MRN: [id number]  [place] 1975  Date of evaluation: 2022    CHIEF COMPLAINT       Chief Complaint   Patient presents with    Migraine     X9 days       HISTORY OF PRESENT ILLNESS    Sharon Matos is a 52 y.o. female who presents to the emergency department complaining of a headache bitemporal for the past 9 days. Remote history of migraines at times similar to this but it has been a long time. She denies any fevers or chills. No neck pain. No photophobia. Treatment for nausea without vomiting. She states that 9 days ago she went and took a nap and when she woke up she was blind in both eyes lasting for about 20 minutes. She said that made her very anxious but she did not seek any treatment. She has been dealing with a headache since then. Denies any other symptoms. No neck pain. REVIEW OF SYSTEMS       Constitutional: No fevers or chills   HEENT: No sore throat, rhinorrhea, or earache   Eyes: No blurry vision or double vision no drainage positive blindness resolved  Cardiovascular: No chest pain or tachycardia   Respiratory: No wheezing or shortness of breath no cough   Gastrointestinal: Positive nausea no vomiting, diarrhea, constipation, or abdominal pain   : No hematuria or dysuria   Musculoskeletal: No swelling or pain   Skin: No rash   Neurological: No focal neurologic complaints, paresthesias, weakness, positive headache     PAST MEDICAL HISTORY    has a past medical history of Herpes labialis, Hyperlipidemia, Hypertension, and Uterine leiomyoma. SURGICAL HISTORY      has a past surgical history that includes  section; Hysterectomy (2018); and laparoscopy.     CURRENT MEDICATIONS       Previous Medications    ALBUTEROL SULFATE HFA (VENTOLIN HFA) 108 (90 BASE) MCG/ACT INHALER    Inhale 2 puffs into the lungs 4 times daily as needed for Wheezing    ATORVASTATIN (LIPITOR) 20 MG TABLET    Take 1 tablet by mouth daily    BUPROPION (WELLBUTRIN XL) 150 MG EXTENDED RELEASE TABLET    Take 1 tablet by mouth every morning    BUSPIRONE (BUSPAR) 5 MG TABLET    Take 1 or 2 tablets tid PRN    LISINOPRIL-HYDROCHLOROTHIAZIDE (PRINZIDE;ZESTORETIC) 20-12.5 MG PER TABLET    TAKE 1 TABLET DAILY    MULTIPLE VITAMIN (MULTI-DAY) TABS    Take 1 tablet by mouth daily    NARATRIPTAN (AMERGE) 2.5 MG TABLET    Take 1 tablet by mouth once as needed for Migraine 2.5 mg at onset of headache, may repeat in 4 hours if needed    OMEPRAZOLE (PRILOSEC) 40 MG DELAYED RELEASE CAPSULE    Take 1 capsule by mouth every morning (before breakfast)       ALLERGIES     has No Known Allergies. FAMILY HISTORY     She indicated that the status of her mother is unknown. She indicated that the status of her father is unknown. She indicated that the status of her sister is unknown. She indicated that the status of her maternal grandmother is unknown. She indicated that the status of her maternal grandfather is unknown. She indicated that the status of her paternal grandmother is unknown. She indicated that the status of her paternal grandfather is unknown.     family history includes Arthritis in her paternal grandmother; Breast Cancer in her maternal grandmother and paternal grandmother; Cancer in her maternal grandmother, paternal grandfather, and paternal grandmother; Coronary Art Dis in her mother; Depression in her mother; Diabetes in her mother; Heart Disease in her maternal grandfather and mother; High Blood Pressure in her mother; Other in her father, mother, and sister; Stroke in her mother and paternal grandmother. SOCIAL HISTORY      reports that she has never smoked. She has never used smokeless tobacco. She reports current alcohol use. She reports that she does not use drugs.     PHYSICAL EXAM       ED Triage Vitals [07/08/22 1355]   BP Temp Temp Source Heart Rate Resp SpO2 Height Weight   139/73 97.8 °F (36.6 °C) Oral (!) 119 18 95 % 5' 4\" (1.626 m) 199 lb (90.3 kg)       Constitutional: Alert, oriented x3, nontoxic, answering questions appropriately, acting properly for age, in no acute distress   HEENT: Extraocular muscles intact, mucus membranes moist, no posterior pharyngeal erythema or exudates, Pupils equal, round, reactive to light, no photophobia  Neck: Trachea midline no meningismus  Cardiovascular: Regular rhythm and rate no murmurs   Respiratory: Clear to auscultation bilaterally no wheezes, rhonchi, rales, no respiratory distress no tachypnea no retractions no hypoxia  Gastrointestinal: Soft, nontender, nondistended, positive bowel sounds. No rebound, rigidity, or guarding. Musculoskeletal: No extremity pain or swelling   Neurologic: Moving all 4 extremities without difficulty there are no gross focal neurologic deficits   Skin: Warm and dry       DIFFERENTIAL DIAGNOSIS/ MDM:     No gross focal neurologic deficits. Headache for the past 9 days. Low suspicion for subarachnoid hemorrhage or meningitis. IV fluids and labs. Pain control. CT head.     DIAGNOSTIC RESULTS     EKG: All EKG's are interpreted by the Emergency Department Physician who either signs or Co-signs this chart in the absence of a cardiologist.    1414 sinus tachycardia rate 115  QRS 68  no acute ST or T wave changes    Not indicated unless otherwise documented above    LABS:  Results for orders placed or performed during the hospital encounter of 07/08/22   CBC with Auto Differential   Result Value Ref Range    WBC 7.0 3.5 - 11.0 k/uL    RBC 4.66 4.0 - 5.2 m/uL    Hemoglobin 13.7 12.0 - 16.0 g/dL    Hematocrit 40.2 36 - 46 %    MCV 86.1 80 - 100 fL    MCH 29.5 26 - 34 pg    MCHC 34.2 31 - 37 g/dL    RDW 14.0 12.5 - 15.4 %    Platelets 728 583 - 161 k/uL    MPV 8.0 6.0 - 12.0 fL    Seg Neutrophils 71 (H) 36 - 66 %    Lymphocytes 23 (L) 24 - 44 % Monocytes 5 2 - 11 %    Eosinophils % 0 (L) 1 - 4 %    Basophils 1 0 - 2 %    Segs Absolute 5.00 1.8 - 7.7 k/uL    Absolute Lymph # 1.60 1.0 - 4.8 k/uL    Absolute Mono # 0.40 0.1 - 1.2 k/uL    Absolute Eos # 0.00 0.0 - 0.4 k/uL    Basophils Absolute 0.00 0.0 - 0.2 k/uL   Basic Metabolic Panel   Result Value Ref Range    Glucose 155 (H) 70 - 99 mg/dL    BUN 12 6 - 20 mg/dL    CREATININE 0.70 0.50 - 0.90 mg/dL    Calcium 9.6 8.6 - 10.4 mg/dL    Sodium 137 135 - 144 mmol/L    Potassium 3.5 (L) 3.7 - 5.3 mmol/L    Chloride 99 98 - 107 mmol/L    CO2 27 20 - 31 mmol/L    Anion Gap 11 9 - 17 mmol/L    GFR Non-African American >60 >60 mL/min    GFR African American >60 >60 mL/min    GFR Comment             Not indicated unless otherwise documented above    RADIOLOGY:   I reviewed the radiologist interpretations:    CT HEAD WO CONTRAST   Final Result   No acute intracranial abnormality. Unremarkable CT of the head. Not indicated unless otherwise documented above    EMERGENCY DEPARTMENT COURSE:     The patient was given the following medications:  Orders Placed This Encounter   Medications    0.9 % sodium chloride bolus    ketorolac (TORADOL) injection 15 mg    ondansetron (ZOFRAN) injection 4 mg    dexamethasone (DECADRON) injection 10 mg    magnesium sulfate 2000 mg in 50 mL IVPB premix    ondansetron (ZOFRAN ODT) 4 MG disintegrating tablet     Sig: Take 1 tablet by mouth every 8 hours as needed for Nausea     Dispense:  20 tablet     Refill:  0        Vitals:   -------------------------  /73   Pulse (!) 119   Temp 97.8 °F (36.6 °C) (Oral)   Resp 18   Ht 5' 4\" (1.626 m)   Wt 90.3 kg (199 lb)   SpO2 95%   BMI 34.16 kg/m²     3:15 PM normal CBC and BMP. CT of the head negative for acute intracranial abnormality. Pain now 4/10 down from a 6. We will continue IV fluids and finish magnesium. 4:10 PM will discharge on Zofran.   Follow-up with neurology and return if worsening symptoms or other concerns. No gross focal neurologic deficits. The patient understands that at this time there is no evidence for a more malignant underlying process, but also understands that early in the process of an illness or injury, an emergency department workup can be falsely reassuring. Routine discharge counseling was given, and it is understood that worsening, changing or persistent symptoms should prompt an immediate call or follow up with their primary physician or return to the emergency department. The importance of appropriate follow up was also discussed. I have reviewed the disposition diagnosis. I have answered the questions and given discharge instructions. There was voiced understanding of these instructions and no further questions or complaints. CRITICAL CARE:    None    CONSULTS:    None    PROCEDURES:    None      OARRS Report if indicated             FINAL IMPRESSION      1.  Other migraine with status migrainosus, intractable          DISPOSITION/PLAN   DISPOSITION          CONDITION ON DISPOSITION: STABLE       PATIENT REFERRED TO:  Columba Barber MD  [address] [place] [address].  [phone]    Schedule an appointment as soon as possible for a visit in 3 days        DISCHARGE MEDICATIONS:  New Prescriptions    ONDANSETRON (ZOFRAN ODT) 4 MG DISINTEGRATING TABLET    Take 1 tablet by mouth every 8 hours as needed for Nausea       (Please note that portions of this note were completed with a voice recognition program.  Efforts were made to edit the dictations but occasionally words are mis-transcribed.)    Avril Lau DO   Attending Emergency Physician     Avril Lau DO  07/08/22 3483

## 2022-07-09 LAB
EKG ATRIAL RATE: 115 BPM
EKG P-R INTERVAL: 158 MS
EKG Q-T INTERVAL: 288 MS
EKG QRS DURATION: 68 MS
EKG QTC CALCULATION (BAZETT): 398 MS
EKG R AXIS: -25 DEGREES
EKG T AXIS: 158 DEGREES
EKG VENTRICULAR RATE: 115 BPM

## 2022-07-11 ENCOUNTER — PATIENT MESSAGE (OUTPATIENT)
Dept: PRIMARY CARE CLINIC | Age: 47
End: 2022-07-11

## 2022-07-11 NOTE — TELEPHONE ENCOUNTER
From: Austin King  To: Brandon Diaz  Sent: 7/11/2022 2:25 PM EDT  Subject: Recent ECG    June Holden can you take a look at the recent ECG. I have questions. The comments listed are low QRS and t wave abnormality now present in lateral leads with the previous Twave -8 a month ago and the recent one -158. While I have no idea what this means it seems like quite a difference and Im actually having panic attacks over my health and why I feel like garbage all of a sudden. Seriously, I cant focus. Im obsessing over this . Assuming these chest discomforts are anxiety . I need to deactivate my MyChart! Sorry to message you but I didnt know if you would see the reports and wanted your take.

## 2022-07-12 NOTE — TELEPHONE ENCOUNTER
Spoke with patient about her EKGs after reviewing them with Dr. Humera Owens and about continuing Wellbutrin for anxiety. She will restart her Prilosec as I believe the sharps pains in chest might be esophagus related.

## 2022-07-19 ENCOUNTER — HOSPITAL ENCOUNTER (OUTPATIENT)
Dept: NON INVASIVE DIAGNOSTICS | Age: 47
Discharge: HOME | End: 2022-07-19
Payer: COMMERCIAL

## 2022-07-19 DIAGNOSIS — R06.02 SHORTNESS OF BREATH: ICD-10-CM

## 2022-07-19 LAB
LV EF: 55 %
LVEF MODALITY: NORMAL

## 2022-07-19 PROCEDURE — 93306 TTE W/DOPPLER COMPLETE: CPT

## 2022-07-21 DIAGNOSIS — E04.9 THYROID ENLARGEMENT: Primary | ICD-10-CM

## 2022-07-22 ENCOUNTER — HOSPITAL ENCOUNTER (OUTPATIENT)
Dept: ULTRASOUND IMAGING | Age: 47
Discharge: HOME | End: 2022-07-24
Payer: COMMERCIAL

## 2022-07-22 DIAGNOSIS — E04.9 THYROID ENLARGEMENT: ICD-10-CM

## 2022-07-22 LAB
THYROXINE, FREE: 1.26 NG/DL (ref 0.93–1.7)
TSH SERPL DL<=0.05 MIU/L-ACNC: 2.27 UIU/ML (ref 0.3–5)

## 2022-07-22 PROCEDURE — 76536 US EXAM OF HEAD AND NECK: CPT

## 2022-08-04 ENCOUNTER — HOSPITAL ENCOUNTER (OUTPATIENT)
Age: 47
Discharge: HOME | End: 2022-08-04
Payer: COMMERCIAL

## 2022-08-04 ENCOUNTER — OFFICE VISIT (OUTPATIENT)
Dept: PRIMARY CARE CLINIC | Age: 47
End: 2022-08-04
Payer: COMMERCIAL

## 2022-08-04 VITALS
OXYGEN SATURATION: 98 % | BODY MASS INDEX: 33.46 KG/M2 | DIASTOLIC BLOOD PRESSURE: 84 MMHG | HEIGHT: 64 IN | SYSTOLIC BLOOD PRESSURE: 138 MMHG | HEART RATE: 93 BPM | WEIGHT: 196 LBS

## 2022-08-04 DIAGNOSIS — R73.01 IMPAIRED FASTING GLUCOSE: ICD-10-CM

## 2022-08-04 DIAGNOSIS — F43.23 ADJUSTMENT DISORDER WITH MIXED ANXIETY AND DEPRESSED MOOD: ICD-10-CM

## 2022-08-04 DIAGNOSIS — F41.8 ANXIETY ABOUT HEALTH: ICD-10-CM

## 2022-08-04 DIAGNOSIS — H54.7 LOSS OF VISION: ICD-10-CM

## 2022-08-04 DIAGNOSIS — R20.2 PARESTHESIA OF ARM: Primary | ICD-10-CM

## 2022-08-04 DIAGNOSIS — H20.9 UVEITIS: ICD-10-CM

## 2022-08-04 DIAGNOSIS — M50.30 BULGING OF CERVICAL INTERVERTEBRAL DISC: ICD-10-CM

## 2022-08-04 DIAGNOSIS — R29.898 WEAKNESS OF BOTH ARMS: ICD-10-CM

## 2022-08-04 DIAGNOSIS — R11.14 BILIOUS VOMITING, PRESENCE OF NAUSEA NOT SPECIFIED: ICD-10-CM

## 2022-08-04 DIAGNOSIS — R20.2 PARESTHESIA OF ARM: ICD-10-CM

## 2022-08-04 DIAGNOSIS — G51.8 FACIAL NEURALGIA: ICD-10-CM

## 2022-08-04 DIAGNOSIS — R51.9 NONINTRACTABLE EPISODIC HEADACHE, UNSPECIFIED HEADACHE TYPE: ICD-10-CM

## 2022-08-04 DIAGNOSIS — R14.0 BLOATING: ICD-10-CM

## 2022-08-04 LAB
ANION GAP SERPL CALCULATED.3IONS-SCNC: 11 MMOL/L (ref 9–17)
BUN BLDV-MCNC: 16 MG/DL (ref 6–20)
C-REACTIVE PROTEIN: <3 MG/L (ref 0–5)
CALCIUM SERPL-MCNC: 9.5 MG/DL (ref 8.6–10.4)
CHLORIDE BLD-SCNC: 102 MMOL/L (ref 98–107)
CO2: 26 MMOL/L (ref 20–31)
CREAT SERPL-MCNC: 0.7 MG/DL (ref 0.5–0.9)
GFR AFRICAN AMERICAN: >60 ML/MIN
GFR NON-AFRICAN AMERICAN: >60 ML/MIN
GFR SERPL CREATININE-BSD FRML MDRD: NORMAL ML/MIN/{1.73_M2}
GLUCOSE BLD-MCNC: 95 MG/DL (ref 70–99)
POTASSIUM SERPL-SCNC: 3.9 MMOL/L (ref 3.7–5.3)
RHEUMATOID FACTOR: <10 IU/ML
SEDIMENTATION RATE, ERYTHROCYTE: 7 MM/HR (ref 0–20)
SODIUM BLD-SCNC: 139 MMOL/L (ref 135–144)
VITAMIN B-12: 706 PG/ML (ref 232–1245)

## 2022-08-04 PROCEDURE — 80048 BASIC METABOLIC PNL TOTAL CA: CPT

## 2022-08-04 PROCEDURE — 86140 C-REACTIVE PROTEIN: CPT

## 2022-08-04 PROCEDURE — 36415 COLL VENOUS BLD VENIPUNCTURE: CPT

## 2022-08-04 PROCEDURE — 85652 RBC SED RATE AUTOMATED: CPT

## 2022-08-04 PROCEDURE — 99215 OFFICE O/P EST HI 40 MIN: CPT | Performed by: PHYSICIAN ASSISTANT

## 2022-08-04 PROCEDURE — 82607 VITAMIN B-12: CPT

## 2022-08-04 PROCEDURE — 86225 DNA ANTIBODY NATIVE: CPT

## 2022-08-04 PROCEDURE — 86038 ANTINUCLEAR ANTIBODIES: CPT

## 2022-08-04 PROCEDURE — 86431 RHEUMATOID FACTOR QUANT: CPT

## 2022-08-04 RX ORDER — ERYTHROMYCIN 5 MG/G
0.5 OINTMENT OPHTHALMIC NIGHTLY
COMMUNITY
Start: 2022-07-27 | End: 2022-10-25

## 2022-08-04 ASSESSMENT — ENCOUNTER SYMPTOMS
VOMITING: 1
DIARRHEA: 1
CONSTIPATION: 1
ABDOMINAL DISTENTION: 1
SHORTNESS OF BREATH: 1
ABDOMINAL PAIN: 1

## 2022-08-04 NOTE — PROGRESS NOTES
Diagnosis Date    Herpes labialis     Hyperlipidemia     Hypertension     Uterine leiomyoma       Past Surgical History:   Procedure Laterality Date     SECTION      HYSTERECTOMY (CERVIX STATUS UNKNOWN)  2018    With Removal of Submucous Leiomyomata    LAPAROSCOPY      Excis Endometriotic Tissue Uterosacral Ligaments       Family History   Problem Relation Age of Onset    Other Mother         Cluster Headaches; Fibromyalgia    Heart Disease Mother     High Blood Pressure Mother     Depression Mother     Diabetes Mother     Stroke Mother     Coronary Art Dis Mother     Other Father         Alcoholism, COPD    Other Sister         Ovarian Neoplasm    Cancer Maternal Grandmother         breast    Breast Cancer Maternal Grandmother     Heart Disease Maternal Grandfather     Cancer Paternal Grandmother         breast    Stroke Paternal Grandmother     Arthritis Paternal Grandmother     Breast Cancer Paternal Grandmother     Cancer Paternal Grandfather         lung       Social History     Tobacco Use    Smoking status: Never    Smokeless tobacco: Never   Substance Use Topics    Alcohol use:  Yes     Alcohol/week: 0.0 standard drinks      Current Outpatient Medications   Medication Sig Dispense Refill    erythromycin (ROMYCIN) 5 MG/GM ophthalmic ointment Apply 0.5 inches to eye nightly      lisinopril-hydroCHLOROthiazide (PRINZIDE;ZESTORETIC) 20-12.5 MG per tablet TAKE 1 TABLET DAILY 90 tablet 3    naratriptan (AMERGE) 2.5 MG tablet Take 1 tablet by mouth once as needed for Migraine 2.5 mg at onset of headache, may repeat in 4 hours if needed 9 tablet 2    omeprazole (PRILOSEC) 40 MG delayed release capsule Take 1 capsule by mouth every morning (before breakfast) 30 capsule 5    Multiple Vitamin (MULTI-DAY) TABS Take 1 tablet by mouth daily      ondansetron (ZOFRAN ODT) 4 MG disintegrating tablet Take 1 tablet by mouth every 8 hours as needed for Nausea (Patient not taking: Reported on 2022) 20 tablet 0 buPROPion (WELLBUTRIN XL) 150 MG extended release tablet Take 1 tablet by mouth every morning (Patient not taking: Reported on 8/4/2022) 30 tablet 0    atorvastatin (LIPITOR) 20 MG tablet Take 1 tablet by mouth daily (Patient not taking: Reported on 8/4/2022) 90 tablet 1    albuterol sulfate HFA (VENTOLIN HFA) 108 (90 Base) MCG/ACT inhaler Inhale 2 puffs into the lungs 4 times daily as needed for Wheezing (Patient not taking: Reported on 8/4/2022) 18 g 0    busPIRone (BUSPAR) 5 MG tablet Take 1 or 2 tablets tid PRN (Patient not taking: Reported on 8/4/2022) 270 tablet 1     No current facility-administered medications for this visit. No Known Allergies    Health Maintenance   Topic Date Due    HIV screen  Never done    Hepatitis C screen  Never done    Colorectal Cancer Screen  Never done    Flu vaccine (1) 09/01/2022    Lipids  06/06/2023    Depression Monitoring  06/06/2023    DTaP/Tdap/Td vaccine (2 - Td or Tdap) 11/19/2028    COVID-19 Vaccine  Completed    Hepatitis A vaccine  Aged Out    Hepatitis B vaccine  Aged Out    Hib vaccine  Aged Out    Meningococcal (ACWY) vaccine  Aged Out    Pneumococcal 0-64 years Vaccine  Aged Out       Subjective:      Review of Systems   Constitutional:  Positive for fatigue. Negative for chills, diaphoresis and fever. HENT:  Negative for mouth sores. Eyes:         Dry eye   Respiratory:  Positive for shortness of breath (better off of the anxiety meds). Cardiovascular:  Positive for chest pain (left sided). Gastrointestinal:  Positive for abdominal distention, abdominal pain, constipation, diarrhea and vomiting (bile). Endocrine: Negative. Genitourinary: Negative. Musculoskeletal:  Positive for arthralgias (arms). Skin:  Negative for rash. Neurological:  Positive for dizziness (in am), weakness (in arms), numbness and headaches. Negative for syncope. Hematological:  Negative for adenopathy.    Psychiatric/Behavioral:  Positive for sleep disturbance (restful). The patient is nervous/anxious. Objective:     /84   Pulse 93   Ht 5' 4\" (1.626 m)   Wt 196 lb (88.9 kg)   SpO2 98%   BMI 33.64 kg/m²   Physical Exam  Vitals and nursing note reviewed. Constitutional:       Appearance: Normal appearance. Eyes:      General: Lids are normal.      Pupils: Pupils are equal, round, and reactive to light. Comments: Left eye has some redness   Neck:      Vascular: No carotid bruit. Cardiovascular:      Rate and Rhythm: Normal rate and regular rhythm. Heart sounds: Normal heart sounds. Pulmonary:      Effort: Pulmonary effort is normal.      Breath sounds: Normal breath sounds. Abdominal:      General: Abdomen is flat. Bowel sounds are normal. There is no distension. Palpations: There is no mass. Tenderness: There is abdominal tenderness (left lower quad). There is guarding. There is no rebound. Hernia: No hernia is present. Musculoskeletal:      Right upper arm: Normal.      Left upper arm: Normal.      Right forearm: Normal.      Left forearm: Normal.      Right hand: Normal.      Left hand: Normal.      Right lower leg: No edema. Left lower leg: No edema. Lymphadenopathy:      Cervical: No cervical adenopathy. Neurological:      Mental Status: She is alert and oriented to person, place, and time. Psychiatric:         Mood and Affect: Mood normal.       Assessment:       Diagnosis Orders   1. Paresthesia of arm  ROCIO Screen with Reflex    C-Reactive Protein    Sedimentation rate, automated    Rheumatoid Factor    Basic Metabolic Panel    Vitamin B12    EMG    MRI BRAIN W WO CONTRAST    External Referral To Neurology      2. Uveitis  External Referral To Neurology      3. Bloating  US GALLBLADDER RUQ    NM HEPATOBILIARY SCAN W PHARMACOLOGICAL INTERVENTION      4. Bilious vomiting, presence of nausea not specified  NM HEPATOBILIARY SCAN W PHARMACOLOGICAL INTERVENTION      5.  Anxiety about [facility] ([address] [place] [address])      6. Weakness of both arms  EMG    MRI BRAIN W WO CONTRAST    External Referral To Neurology      7. Nonintractable episodic headache, unspecified headache type  MRI BRAIN W WO CONTRAST    External Referral To Neurology      8. Bulging of cervical intervertebral disc  External Referral To Neurology      9. 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 Impaired fasting glucose        12. Adjustment disorder with mixed anxiety and depressed mood  [facility] ([address] [place] [address])           Plan:    REviewed MRI of neck  2021  REviewed [address]  REviewed BW for last 2 months  Ordered MRI of brain and EMG  REferred to neurology  REferred to behavioral health for anxiety instead of using medication  BW for autoimmune and paresthesias ordered  GB US and HIDA scan ordered  Spent 45 minutes with and preparing for today's visit. Return in about 1 month (around 9/4/2022) for 1/2 hour. Orders Placed This Encounter   Procedures    US GALLBLADDER RUQ     Standing Status:   Future     Standing Expiration Date:   8/4/2023     Order Specific Question:   Reason for exam:     Answer:   pain    NM HEPATOBILIARY SCAN W PHARMACOLOGICAL INTERVENTION     Standing Status:   Future     Standing Expiration Date:   8/5/2023    MRI BRAIN W WO CONTRAST     Standing Status:   Future     Standing Expiration Date:   8/4/2023     Order Specific Question:   STAT Creatinine as needed:     Answer:   Yes     Order Specific Question:   What is the sedation requirement?      Answer:   None    ROCIO Screen with Reflex     Standing Status:   Future     Number of Occurrences:   1     Standing Expiration Date:   8/4/2023    C-Reactive Protein     Standing Status:   Future     Number of Occurrences:   1     Standing Expiration Date:   8/5/2023    Sedimentation rate, automated     Standing Status:   Future     Number of Occurrences:   1     Standing Expiration Date:   8/4/2023    Rheumatoid Factor     Standing Status:   Future     Number of Occurrences:   1     Standing Expiration Date:   9/3/3257    Basic Metabolic Panel     Standing Status:   Future     Number of Occurrences:   1     Standing Expiration Date:   8/4/2023    Vitamin B12     Standing Status:   Future     Number of Occurrences:   1     Standing Expiration Date:   8/4/2023    [facility])     Referral Priority:   Routine     Referral Type:   Behavioral Health     Referral Reason:   Specialty Services Required     Requested Specialty:   Behavioral Health     Number of Visits Requested:   1    External Referral To Neurology     Referral Priority:   Routine     Referral Type:   Eval and Treat     Referral Reason:   Specialty Services Required     Requested Specialty:   Neurology     Number of Visits Requested:   1    EMG     Standing Status:   Future     Standing Expiration Date:   10/3/2022     Order Specific Question:   Which body part? Answer:   upper extremities       No orders of the defined types were placed in this encounter. Patient given educational materials - see patient instructions. Discussed use, benefit, and side effects of prescribed medications. All patient questions answered. Pt voiced understanding. Reviewed health maintenance. Instructed to continue current medications, diet and exercise. Patient agreed with treatment plan. Follow up as directed.      Electronically signed by Dani Wagner PA-C on 8/4/2022 at 5:39 PM

## 2022-08-05 LAB
ANTI DNA DOUBLE STRANDED: 0.8 IU/ML
ANTI-NUCLEAR ANTIBODY (ANA): NEGATIVE
ENA ANTIBODIES SCREEN: 0.1 U/ML

## 2022-08-08 ENCOUNTER — HOSPITAL ENCOUNTER (OUTPATIENT)
Dept: ULTRASOUND IMAGING | Age: 47
Discharge: HOME | End: 2022-08-10
Payer: COMMERCIAL

## 2022-08-08 DIAGNOSIS — R14.0 BLOATING: ICD-10-CM

## 2022-08-08 PROCEDURE — 76705 ECHO EXAM OF ABDOMEN: CPT

## 2022-08-15 ENCOUNTER — HOSPITAL ENCOUNTER (OUTPATIENT)
Dept: NUCLEAR MEDICINE | Age: 47
Discharge: HOME | End: 2022-08-17
Payer: COMMERCIAL

## 2022-08-15 ENCOUNTER — HOSPITAL ENCOUNTER (OUTPATIENT)
Dept: MRI IMAGING | Age: 47
Discharge: HOME | End: 2022-08-17
Payer: COMMERCIAL

## 2022-08-15 DIAGNOSIS — R11.14 BILIOUS VOMITING, PRESENCE OF NAUSEA NOT SPECIFIED: ICD-10-CM

## 2022-08-15 DIAGNOSIS — R14.0 BLOATING: ICD-10-CM

## 2022-08-15 DIAGNOSIS — R20.2 PARESTHESIA OF ARM: ICD-10-CM

## 2022-08-15 DIAGNOSIS — R51.9 NONINTRACTABLE EPISODIC HEADACHE, UNSPECIFIED HEADACHE TYPE: ICD-10-CM

## 2022-08-15 DIAGNOSIS — R29.898 WEAKNESS OF BOTH ARMS: ICD-10-CM

## 2022-08-15 PROCEDURE — 70553 MRI BRAIN STEM W/O & W/DYE: CPT

## 2022-08-15 PROCEDURE — 3430000000 HC RX DIAGNOSTIC RADIOPHARMACEUTICAL: Performed by: PHYSICIAN ASSISTANT

## 2022-08-15 PROCEDURE — A9537 TC99M MEBROFENIN: HCPCS | Performed by: PHYSICIAN ASSISTANT

## 2022-08-15 PROCEDURE — A9579 GAD-BASE MR CONTRAST NOS,1ML: HCPCS | Performed by: PHYSICIAN ASSISTANT

## 2022-08-15 PROCEDURE — 78227 HEPATOBIL SYST IMAGE W/DRUG: CPT

## 2022-08-15 PROCEDURE — 6360000004 HC RX CONTRAST MEDICATION: Performed by: PHYSICIAN ASSISTANT

## 2022-08-15 PROCEDURE — 2580000003 HC RX 258: Performed by: PHYSICIAN ASSISTANT

## 2022-08-15 RX ORDER — SODIUM CHLORIDE 0.9 % (FLUSH) 0.9 %
10 SYRINGE (ML) INJECTION PRN
Status: DISCONTINUED | OUTPATIENT
Start: 2022-08-15 | End: 2022-08-18 | Stop reason: HOSPADM

## 2022-08-15 RX ADMIN — Medication 5.3 MILLICURIE: at 07:42

## 2022-08-15 RX ADMIN — SODIUM CHLORIDE, PRESERVATIVE FREE 10 ML: 5 INJECTION INTRAVENOUS at 17:03

## 2022-08-15 RX ADMIN — SODIUM CHLORIDE, PRESERVATIVE FREE 10 ML: 5 INJECTION INTRAVENOUS at 07:42

## 2022-08-15 RX ADMIN — GADOTERIDOL 20 ML: 279.3 INJECTION, SOLUTION INTRAVENOUS at 17:03

## 2022-09-06 ENCOUNTER — OFFICE VISIT (OUTPATIENT)
Dept: PRIMARY CARE CLINIC | Age: 47
End: 2022-09-06
Payer: COMMERCIAL

## 2022-09-06 VITALS
DIASTOLIC BLOOD PRESSURE: 82 MMHG | WEIGHT: 198 LBS | OXYGEN SATURATION: 98 % | HEIGHT: 64 IN | HEART RATE: 102 BPM | SYSTOLIC BLOOD PRESSURE: 136 MMHG | BODY MASS INDEX: 33.8 KG/M2

## 2022-09-06 DIAGNOSIS — R11.14 BILIOUS VOMITING, PRESENCE OF NAUSEA NOT SPECIFIED: Primary | ICD-10-CM

## 2022-09-06 DIAGNOSIS — I10 ESSENTIAL HYPERTENSION: ICD-10-CM

## 2022-09-06 DIAGNOSIS — H04.123 DRY EYES: ICD-10-CM

## 2022-09-06 DIAGNOSIS — G43.829 MENSTRUAL MIGRAINE WITHOUT STATUS MIGRAINOSUS, NOT INTRACTABLE: ICD-10-CM

## 2022-09-06 PROCEDURE — 99214 OFFICE O/P EST MOD 30 MIN: CPT | Performed by: PHYSICIAN ASSISTANT

## 2022-09-06 RX ORDER — ONDANSETRON 4 MG/1
4 TABLET, ORALLY DISINTEGRATING ORAL EVERY 8 HOURS PRN
Qty: 45 TABLET | Refills: 1 | Status: SHIPPED | OUTPATIENT
Start: 2022-09-06

## 2022-09-06 RX ORDER — CYCLOSPORINE 0.5 MG/ML
1 EMULSION OPHTHALMIC 2 TIMES DAILY
Qty: 1 EACH | Refills: 0
Start: 2022-09-06

## 2022-09-06 RX ORDER — LISINOPRIL 20 MG/1
20 TABLET ORAL DAILY
Qty: 30 TABLET | Refills: 5 | Status: SHIPPED | OUTPATIENT
Start: 2022-09-06

## 2022-09-06 RX ORDER — LISINOPRIL 10 MG/1
10 TABLET ORAL DAILY
Qty: 30 TABLET | Refills: 5 | Status: SHIPPED | OUTPATIENT
Start: 2022-09-06

## 2022-09-06 RX ORDER — NARATRIPTAN 2.5 MG/1
2.5 TABLET ORAL
Qty: 9 TABLET | Refills: 2 | Status: SHIPPED | OUTPATIENT
Start: 2022-09-06 | End: 2022-10-25

## 2022-09-06 ASSESSMENT — ENCOUNTER SYMPTOMS
EYE PAIN: 1
ABDOMINAL PAIN: 1
NAUSEA: 0
EYE REDNESS: 1
PHOTOPHOBIA: 0
ABDOMINAL DISTENTION: 1
VOMITING: 1
SHORTNESS OF BREATH: 0

## 2022-09-06 NOTE — PROGRESS NOTES
[facility] CARE  [address] [place] [id number]  Dept: [phone]    Jodie Lamar is a 52 y.o. female who presents today for her medical conditions/complaints as noted below. Chief Complaint   Patient presents with    1 Month Follow-Up     1 month f/u for thyroid and SOB. Patient had imaging done as well she would like to f/u on- results in chart        HPI:     HPI  Eye : Restasis and a gel at night. Dry eye and uveitits--this is worst symptom at this time. Has not seen the Neurologist yet---end of Oct .  EMG at end of October as well. Still has cold sensations in arms  Hand burning is gone. Vomiting bile or mucus and dry heaves in a jag and started 16 years ago. Started having children then. Lasts a minute and happens in 1-3 times a week. No choking, no trouble swallowing. No weight loss. No pattern to this. Does not happen only when anxious. Still does not want to try medication for anxiety. Reviewed last testing ordered and discussed the importance of treating the high cholesterol.       LDL Cholesterol (mg/dL)   Date Value   2022 192 (H)   2021 138 (H)   12/15/2020 164 (H)     LDL Calculated (mg/dL)   Date Value   2018 123   2017 180 (A)       (goal LDL is <100)   AST (U/L)   Date Value   2022 19     ALT (U/L)   Date Value   2022 20     BUN (mg/dL)   Date Value   2022 16     BP Readings from Last 3 Encounters:   22 136/82   22 138/84   22 139/73          (goal 120/80)    Past Medical History:   Diagnosis Date    Herpes labialis     Hyperlipidemia     Hypertension     Uterine leiomyoma       Past Surgical History:   Procedure Laterality Date     SECTION      HYSTERECTOMY (CERVIX STATUS UNKNOWN)  2018    With Removal of Submucous Leiomyomata    LAPAROSCOPY      Excis Endometriotic Tissue Uterosacral Ligaments       Family History   Problem Relation Age of Onset Other Mother         Cluster Headaches; Fibromyalgia    Heart Disease Mother     High Blood Pressure Mother     Depression Mother     Diabetes Mother     Stroke Mother     Coronary Art Dis Mother     Other Father         Alcoholism, COPD    Other Sister         Ovarian Neoplasm    Cancer Maternal Grandmother         breast    Breast Cancer Maternal Grandmother     Heart Disease Maternal Grandfather     Cancer Paternal Grandmother         breast    Stroke Paternal Grandmother     Arthritis Paternal Grandmother     Breast Cancer Paternal Grandmother     Cancer Paternal Grandfather         lung       Social History     Tobacco Use    Smoking status: Never    Smokeless tobacco: Never   Substance Use Topics    Alcohol use: Yes     Alcohol/week: 0.0 standard drinks      Current Outpatient Medications   Medication Sig Dispense Refill    lisinopril (PRINIVIL;ZESTRIL) 10 MG tablet Take 1 tablet by mouth daily 30 tablet 5    lisinopril (PRINIVIL;ZESTRIL) 20 MG tablet Take 1 tablet by mouth daily 30 tablet 5    ondansetron (ZOFRAN ODT) 4 MG disintegrating tablet Take 1 tablet by mouth every 8 hours as needed for Nausea 45 tablet 1    cycloSPORINE (RESTASIS) 0.05 % ophthalmic emulsion Place 1 drop into both eyes 2 times daily 1 each 0    naratriptan (AMERGE) 2.5 MG tablet Take 1 tablet by mouth once as needed for Migraine 2.5 mg at onset of headache, may repeat in 4 hours if needed 9 tablet 2    erythromycin (ROMYCIN) 5 MG/GM ophthalmic ointment Apply 0.5 inches to eye nightly      omeprazole (PRILOSEC) 40 MG delayed release capsule Take 1 capsule by mouth every morning (before breakfast) 30 capsule 5    Multiple Vitamin (MULTI-DAY) TABS Take 1 tablet by mouth daily      atorvastatin (LIPITOR) 20 MG tablet Take 1 tablet by mouth daily (Patient not taking: Reported on 8/4/2022) 90 tablet 1     No current facility-administered medications for this visit.      No Known Allergies    Health Maintenance   Topic Date Due    HIV screen Right lower leg: No edema. Left lower leg: No edema. Skin:     Findings: No rash. Neurological:      Mental Status: She is alert and oriented to person, place, and time. Psychiatric:         Mood and Affect: Mood normal.       Assessment:       Diagnosis Orders   1. Bilious vomiting, presence of nausea not specified  Allergen, Food, Comprehensive Profile 1    ondansetron (ZOFRAN ODT) 4 MG disintegrating tablet    Ok Kim MD, [facility] [place]    Celiac Disease Panel      2. Menstrual migraine without status migrainosus, not intractable  naratriptan (AMERGE) 2.5 MG tablet      3. Essential hypertension  lisinopril (PRINIVIL;ZESTRIL) 10 MG tablet    lisinopril (PRINIVIL;ZESTRIL) 20 MG tablet      4. Dry eyes  Allergen, Food, Comprehensive Profile 1           Plan:    Waiting on Lipitor for the neurology appt  Take off HCTZ from Lisinopril to see if helps dry eyes  Increased Lisinopril to 30mg qd  Monitor BP  REviewed tests  Ordered Celiac panel and food allergan panel  REferral to GI for the vomiting. Return in about 1 month (around 10/6/2022) for HTN.     Orders Placed This Encounter   Procedures    Allergen, Food, Comprehensive Profile 1     Standing Status:   Future     Standing Expiration Date:   9/6/2023    Celiac Disease Panel     Standing Status:   Future     Standing Expiration Date:   9/6/2023    Ok Kim MD, [facility]enter[facility], [place]     Referral Priority:   Routine     Referral Type:   Eval and Treat     Referral Reason:   Specialty Services Required     Referred to Provider:   Brian Moreland MD     Requested Specialty:   Gastroenterology     Number of Visits Requested:   1       Orders Placed This Encounter   Medications    lisinopril (PRINIVIL;ZESTRIL) 10 MG tablet     Sig: Take 1 tablet by mouth daily     Dispense:  30 tablet     Refill:  5    lisinopril (PRINIVIL;ZESTRIL) 20 MG tablet     Sig: Take 1 tablet by mouth daily     Dispense:  30 tablet     Refill:  5    ondansetron (ZOFRAN ODT) 4 MG disintegrating tablet     Sig: Take 1 tablet by mouth every 8 hours as needed for Nausea     Dispense:  45 tablet     Refill:  1    cycloSPORINE (RESTASIS) 0.05 % ophthalmic emulsion     Sig: Place 1 drop into both eyes 2 times daily     Dispense:  1 each     Refill:  0    naratriptan (AMERGE) 2.5 MG tablet     Sig: Take 1 tablet by mouth once as needed for Migraine 2.5 mg at onset of headache, may repeat in 4 hours if needed     Dispense:  9 tablet     Refill:  2         Patient given educational materials - see patient instructions. Discussed use, benefit, and side effects of prescribed medications. All patient questions answered. Pt voiced understanding. Reviewed health maintenance. Instructed to continue current medications, diet and exercise. Patient agreed with treatment plan. Follow up as directed.      Electronically signed by Oni Franco PA-C on 9/6/2022 at 5:53 PM

## 2022-09-07 ENCOUNTER — HOSPITAL ENCOUNTER (OUTPATIENT)
Age: 47
Discharge: HOME | End: 2022-09-07
Payer: COMMERCIAL

## 2022-09-07 DIAGNOSIS — R11.14 BILIOUS VOMITING, PRESENCE OF NAUSEA NOT SPECIFIED: ICD-10-CM

## 2022-09-07 DIAGNOSIS — H04.123 DRY EYES: ICD-10-CM

## 2022-09-07 PROCEDURE — 83516 IMMUNOASSAY NONANTIBODY: CPT

## 2022-09-07 PROCEDURE — 36415 COLL VENOUS BLD VENIPUNCTURE: CPT

## 2022-09-07 PROCEDURE — 86003 ALLG SPEC IGE CRUDE XTRC EA: CPT

## 2022-09-07 PROCEDURE — 82784 ASSAY IGA/IGD/IGG/IGM EACH: CPT

## 2022-09-07 PROCEDURE — 82785 ASSAY OF IGE: CPT

## 2022-09-09 LAB
ALLERGEN BARLEY IGE: <0.1 KU/L (ref 0–0.34)
ALLERGEN BEEF: <0.1 KU/L (ref 0–0.34)
ALLERGEN CABBAGE IGE: <0.1 KU/L (ref 0–0.34)
ALLERGEN CARROT IGE: <0.1 KU/L (ref 0–0.34)
ALLERGEN CHICKEN IGE: <0.1 KU/L (ref 0–0.34)
ALLERGEN CODFISH IGE: <0.1 KU/L (ref 0–0.34)
ALLERGEN CORN IGE: <0.1 KU/L (ref 0–0.34)
ALLERGEN COW MILK IGE: <0.1 KU/L (ref 0–0.34)
ALLERGEN CRAB IGE: <0.1 KU/L (ref 0–0.34)
ALLERGEN EGG WHITE IGE: <0.1 KU/L (ref 0–0.34)
ALLERGEN GRAPE IGE: <0.1 KU/L (ref 0–0.34)
ALLERGEN LETTUCE IGE: <0.1 KU/L (ref 0–0.34)
ALLERGEN NAVY BEAN: <0.1 KU/L (ref 0–0.34)
ALLERGEN OAT: <0.1 KU/L (ref 0–0.34)
ALLERGEN ORANGE IGE: <0.1 KU/L (ref 0–0.34)
ALLERGEN PEANUT (F13) IGE: <0.1 KU/L (ref 0–0.34)
ALLERGEN PEPPER C. ANNUUM IGE: <0.1 KU/L (ref 0–0.34)
ALLERGEN PORK: <0.1 KU/L (ref 0–0.34)
ALLERGEN RICE IGE: <0.1 KU/L (ref 0–0.34)
ALLERGEN RYE IGE: <0.1 KU/L (ref 0–0.34)
ALLERGEN SOYBEAN IGE: <0.1 KU/L (ref 0–0.34)
ALLERGEN TOMATO IGE: <0.1 KU/L (ref 0–0.34)
ALLERGEN TUNA IGE: <0.1 KU/L (ref 0–0.34)
ALLERGEN WHEAT IGE: <0.1 KU/L (ref 0–0.34)
GLIADIN DEAMINIDATED PEPTIDE AB IGA: 0.7 U/ML
GLIADIN DEAMINIDATED PEPTIDE AB IGG: <0.4 U/ML
IGA: 153 MG/DL (ref 70–400)
IGE: 47 IU/ML
POTATO, IGE: <0.1 KU/L (ref 0–0.34)
SHRIMP: <0.1 KU/L (ref 0–0.34)
TISSUE TRANSGLUTAMINASE IGA: 0.3 U/ML

## 2022-10-25 ENCOUNTER — HOSPITAL ENCOUNTER (OUTPATIENT)
Dept: NEUROLOGY | Age: 47
Discharge: HOME | End: 2022-10-25
Payer: COMMERCIAL

## 2022-10-25 ENCOUNTER — TELEPHONE (OUTPATIENT)
Dept: NEUROLOGY | Age: 47
End: 2022-10-25

## 2022-10-25 ENCOUNTER — OFFICE VISIT (OUTPATIENT)
Dept: NEUROLOGY | Age: 47
End: 2022-10-25
Payer: COMMERCIAL

## 2022-10-25 VITALS
SYSTOLIC BLOOD PRESSURE: 133 MMHG | HEIGHT: 64 IN | BODY MASS INDEX: 33.39 KG/M2 | DIASTOLIC BLOOD PRESSURE: 84 MMHG | WEIGHT: 195.6 LBS | HEART RATE: 96 BPM

## 2022-10-25 DIAGNOSIS — R20.0 NUMBNESS AND TINGLING IN BOTH HANDS: Primary | ICD-10-CM

## 2022-10-25 DIAGNOSIS — H54.62 VISION LOSS OF LEFT EYE: ICD-10-CM

## 2022-10-25 DIAGNOSIS — M54.12 CERVICAL RADICULOPATHY: ICD-10-CM

## 2022-10-25 DIAGNOSIS — M54.81 OCCIPITAL NEURALGIA OF LEFT SIDE: ICD-10-CM

## 2022-10-25 DIAGNOSIS — R20.2 NUMBNESS AND TINGLING IN BOTH HANDS: Primary | ICD-10-CM

## 2022-10-25 PROCEDURE — 95910 NRV CNDJ TEST 7-8 STUDIES: CPT | Performed by: PHYSICAL MEDICINE & REHABILITATION

## 2022-10-25 PROCEDURE — 95886 MUSC TEST DONE W/N TEST COMP: CPT | Performed by: PHYSICAL MEDICINE & REHABILITATION

## 2022-10-25 PROCEDURE — 3078F DIAST BP <80 MM HG: CPT | Performed by: NURSE PRACTITIONER

## 2022-10-25 PROCEDURE — 99204 OFFICE O/P NEW MOD 45 MIN: CPT | Performed by: NURSE PRACTITIONER

## 2022-10-25 PROCEDURE — 3074F SYST BP LT 130 MM HG: CPT | Performed by: NURSE PRACTITIONER

## 2022-10-25 NOTE — PROGRESS NOTES
[facility]            [address]          [place], [address] [facility]          Dept: [phone]          Dept Fax: [phone]      MD Alfreda Headley MD Cristobal Boys, MD Estanislao Distel, CORNELIA         New Patient Consultation    10/25/2022    HISTORY OF PRESENT ILLNESS:       I had the pleasure of seeing Supriya Camps who presents to establish neurologic care. The patient presents for evaluation of paresthesias of the hands. All of her lab testing and prior imaging studies completed by her other providers were personally reviewed    The patient has had a myriad of symptoms beginning in approximately the fall 2020. She initially started to complain of bilateral arm numbness. An MRI of her cervical spine was completed showing spinal stenosis with moderate bilateral neuroforaminal narrowing at C5-6 and disc bulges at C4-5 and C6-7 mildly effacing the ventral aspect of the thecal sac but without significant spinal stenosis. This was completed in March 2021 and then she had a consultation with neurosurgery who referred her to a chiropractor. She had significant relief of her arm numbness with chiropractic care. She was also started on gabapentin temporarily. During that time, she also had pain in the right side of her face. It was a shooting and sharp pain. She had dental work done and it was thought that it was secondary to that. She was placed on gabapentin and carbamazepine at that time. After experiencing relentless pain, she finally had a tooth pulled and it was found that there was infection and her symptoms were resolved. End of June, the patient woke up from a nap with vision loss in her left eye lasting for approximately 20 minutes. Since that time, she has also had pain in her left occipital area.   In her visit today she did have pain in her left occipital groove radiating throughout her temporal and parietal area and feeling Social History     Socioeconomic History    Marital status:      Spouse name: Not on file    Number of children: Not on file    Years of education: Not on file    Highest education level: Not on file   Occupational History    Not on file   Tobacco Use    Smoking status: Never    Smokeless tobacco: Never    Tobacco comments:     Never   Substance and Sexual Activity    Alcohol use:  Yes     Alcohol/week: 0.0 standard drinks    Drug use: No    Sexual activity: Yes     Partners: Male     Comment: Spouse   Other Topics Concern    Not on file   Social History Narrative    Not on file     Social Determinants of Health     Financial Resource Strain: Low Risk     Difficulty of Paying Living Expenses: Not hard at all   Food Insecurity: No Food Insecurity    Worried About Running Out of Food in the Last Year: Never true    Ran Out of Food in the Last Year: Never true   Transportation Needs: Not on file   Physical Activity: Not on file   Stress: Not on file   Social Connections: Not on file   Intimate Partner Violence: Not on file   Housing Stability: Not on file       CURRENT MEDICATIONS:     Current Outpatient Medications   Medication Sig Dispense Refill    lisinopril (PRINIVIL;ZESTRIL) 20 MG tablet Take 1 tablet by mouth daily 30 tablet 5    cycloSPORINE (RESTASIS) 0.05 % ophthalmic emulsion Place 1 drop into both eyes 2 times daily 1 each 0    naratriptan (AMERGE) 2.5 MG tablet Take 1 tablet by mouth once as needed for Migraine 2.5 mg at onset of headache, may repeat in 4 hours if needed 9 tablet 2    atorvastatin (LIPITOR) 20 MG tablet Take 1 tablet by mouth daily 90 tablet 1    omeprazole (PRILOSEC) 40 MG delayed release capsule Take 1 capsule by mouth every morning (before breakfast) 30 capsule 5    Multiple Vitamin (MULTI-DAY) TABS Take 1 tablet by mouth daily      lisinopril (PRINIVIL;ZESTRIL) 10 MG tablet Take 1 tablet by mouth daily (Patient not taking: Reported on 10/25/2022) 30 tablet 5    ondansetron (ZOFRAN ODT) 4 MG disintegrating tablet Take 1 tablet by mouth every 8 hours as needed for Nausea (Patient not taking: Reported on 10/25/2022) 45 tablet 1     No current facility-administered medications for this visit. ALLERGIES:   No Known Allergies                       All items selected indicate a positive finding. Those items not selected are negative.   Constitutional [] Weight loss/gain   [] Fatigue  [] Fever/Chills   HEENT [] Hearing Loss  [] Visual Disturbance  [] Tinnitus  [] Eye pain   Respiratory [] Shortness of Breath  [] Cough  [] Snoring   Cardiovascular [] Chest Pain  [] Palpitations  [] Lightheaded   GI [] Constipation  [] Diarrhea  [] Swallowing change  [] Nausea/vomiting    [] Urinary Frequency  [] Urinary Urgency   Musculoskeletal [x] Neck pain  [] Back pain  [] Muscle pain  [] Restless legs   Dermatologic [] Skin changes   Neurologic [] Memory loss/confusion  [] Seizures  [] Trouble walking or imbalance  [] Dizziness  [] Sleep disturbance  [] Weakness  [x] Numbness  [] Tremors  [] Speech Difficulty  [x] Headaches  [] Light Sensitivity  [] Sound Sensitivity   Endocrinology []Excessive thirst  []Excessive hunger   Psychiatric [] Anxiety/Depression  [] Hallucination   Allergy/immunology []Hives/environmental allergies   Hematologic/lymph [] Abnormal bleeding  [] Abnormal bruising                   PHYSICAL EXAMINATION:       Vitals:    10/25/22 1516   BP: 133/84   Pulse: 96                                              .                                                                                                    General Appearance:  Alert, cooperative, no signs of distress, appears stated age   Head:  Normocephalic, no signs of trauma   Eyes:  Conjunctiva/corneas clear;  eyelids intact   Ears:  Normal external ear and canals   Nose: Nares normal, mucosa normal, no drainage    Throat: Lips and tongue normal; teeth normal;  gums normal   Neck: Supple, intact flexion, extension and rotation;   trachea midline;  no adenopathy;   thyroid: not enlarged;   no carotid pulse abnormality   Back:   Symmetric, no curvature, ROM adequate   Lungs:   Respirations unlabored   Heart:  Regular rate and rhythm           Extremities: Extremities normal, no cyanosis, no edema   Pulses: Symmetric over head and neck   Skin: Skin color, texture normal, no rashes, no lesions                                     NEUROLOGIC EXAMINATION    Neurologic Exam    Mental status    Alert and oriented x 3; intact memory with no confusion, speech or language problems; no hallucinations or delusions  Fund of information appropriate for level of education    Cranial nerves    II - visual fields intact to confrontation  III, IV, VI - extra-ocular muscles full: no pupillary defect; no ALEKSANDAR, no nystagmus, no ptosis   V - normal facial sensation                                                               VII - normal facial symmetry                                                             VIII - intact hearing                                                                             IX, X - symmetrical palate                                                                  XI - symmetrical shoulder shrug                                                       XII - tongue midline without atrophy or fasciculation      Motor function  Normal muscle bulk and tone; strength 5/5 on all 4 extremities, no pronator drift      Sensory function Intact to light touch, pinprick, vibration, proprioception on all 4 extremities      Cerebellar Intact fine motor movement. No involuntary movements or tremors. No ataxia or dysmetria on finger to nose or heel to shin testing      Reflex function DTR 2+ on bilateral UE and LE, symmetric. Down going toes bilaterally      Gait                   normal base and arm swing              Medical Decision Making: Thank you very much for the kind referral of Sary Silverio.   I look forward to working with you in the neurological care of your patient. Left occipital neuralgia with significant pain to palpation of the left occipital groove at today's visit  We will obtain prior authorization for occipital nerve blocks   Paresthesias of the bilateral upper extremities and a recent episode of left visual loss with associated pain of the left eye which has yet to resolve. She has a history of neck pain which may indicate a cervical etiology for her paresthesias versus a possible neuromyelitis optica spectrum disorder, which is known to cause spinal lesions accompanied by an optic neuritis. . She previously developed a trigeminal neuralgia which is likely secondary to a dental procedure and resolved with use of gabapentin. A referral to Dr. Aubrey Lucas, neurophthalmology, was made at today's visit  EMG/NCV studies completed earlier today and are reported to be normal however the report has yet to be completed  Obtain MRI of the cervical spine W WO contrast   Obtain NMO antibodies  Return for follow up visit in 3 months     Signed: Greta Hawthorne CNP  Please note that this chart was generated using voice recognition Dragon dictation software. Although every effort was made to ensure the accuracy of this automated transcription, some errors in transcription may have occurred. Provider Attestation: The documentation recorded by the scribe accurately reflects the service I personally performed and the decisions made by myself. Portions of this note were transcribed by a scribe. I personally performed the history, physical exam, and medical decision-making and confirm the accuracy of the information in the transcribed note. Scribe Attestation:   By signing my name below, Milka Domenico, attest that this documentation has been prepared under the direction and in the presence of Greta Hawthorne CNP.

## 2022-10-27 NOTE — TELEPHONE ENCOUNTER
According to our guidelines no PA is needed for ONB from any insurance except [employer]. [place] requires one after the first 6 are completed. Patient can be scheduled. FYI her Hima Kimberley is different than what I have seen and did have troubles with her Pas for medications.

## 2022-11-04 ENCOUNTER — OFFICE VISIT (OUTPATIENT)
Dept: NEUROLOGY | Age: 47
End: 2022-11-04
Payer: COMMERCIAL

## 2022-11-04 DIAGNOSIS — M54.81 OCCIPITAL NEURALGIA OF LEFT SIDE: Primary | ICD-10-CM

## 2022-11-04 PROCEDURE — 64405 NJX AA&/STRD GR OCPL NRV: CPT | Performed by: NURSE PRACTITIONER

## 2022-11-04 PROCEDURE — 96372 THER/PROPH/DIAG INJ SC/IM: CPT | Performed by: NURSE PRACTITIONER

## 2022-11-04 PROCEDURE — 64450 NJX AA&/STRD OTHER PN/BRANCH: CPT | Performed by: NURSE PRACTITIONER

## 2022-11-04 RX ORDER — METHYLPREDNISOLONE SODIUM SUCCINATE 40 MG/ML
40 INJECTION, POWDER, LYOPHILIZED, FOR SOLUTION INTRAMUSCULAR; INTRAVENOUS ONCE
Status: COMPLETED | OUTPATIENT
Start: 2022-11-04 | End: 2022-11-04

## 2022-11-04 RX ADMIN — METHYLPREDNISOLONE SODIUM SUCCINATE 40 MG: 40 INJECTION, POWDER, LYOPHILIZED, FOR SOLUTION INTRAMUSCULAR; INTRAVENOUS at 11:10

## 2022-11-04 NOTE — PROGRESS NOTES
MD Alfreda Sesay MD  Left MD Vivien Liu CNP      Procedure Note    Procedure: Left greater and Lesser Occipital Nerve Block (CPT code 32113, 88586)    Indications:  Severe left occipital neuralgia (ICD 10 M54.81)    Informed consent was obtained (explaining the procedure and risks and benefits) from patient. The signed consent form was placed in the medical record. A time out was completed, verifying correct patient, procedure,site, positioning, and implants or special equipment. Patient's left occipital area was palpated to identify location of the greater and the lesser occipital nerve. Using a 10 ml syringe, 7 ml of 2% lidocaine (from a 20 ml bottle) and 1 ml (40 mg) of methylprednisolone was aspirated. Alcohol was applied topically to the skin. Using a 27 gauge needle (aspirating during insertion),  2 ml was injected on the greater and lesser occipital nerve on the left side. Pressure with a gauze pad was held briefly upon the site of puncture to minimize bleeding and to further spread anaesthetic subcutaneously. There were no complications. Patient was comfortable and left without complaint. Empty vial of methylprednisolone was discarded.

## 2022-11-30 ENCOUNTER — HOSPITAL ENCOUNTER (OUTPATIENT)
Dept: MRI IMAGING | Age: 47
Discharge: HOME | End: 2022-12-02
Payer: COMMERCIAL

## 2022-11-30 ENCOUNTER — HOSPITAL ENCOUNTER (OUTPATIENT)
Age: 47
Discharge: HOME | End: 2022-11-30

## 2022-11-30 DIAGNOSIS — R20.2 NUMBNESS AND TINGLING IN BOTH HANDS: ICD-10-CM

## 2022-11-30 DIAGNOSIS — R20.0 NUMBNESS AND TINGLING IN BOTH HANDS: ICD-10-CM

## 2022-11-30 LAB
CREAT SERPL-MCNC: 0.78 MG/DL (ref 0.5–0.9)
GFR SERPL CREATININE-BSD FRML MDRD: >60 ML/MIN/1.73M2
REASON FOR REJECTION: NORMAL
ZZ NTE CLEAN UP: ORDERED TEST: NORMAL
ZZ NTE WITH NAME CLEAN UP: SPECIMEN SOURCE: NORMAL

## 2022-11-30 PROCEDURE — 36415 COLL VENOUS BLD VENIPUNCTURE: CPT

## 2022-11-30 PROCEDURE — 6360000004 HC RX CONTRAST MEDICATION: Performed by: NURSE PRACTITIONER

## 2022-11-30 PROCEDURE — 72156 MRI NECK SPINE W/O & W/DYE: CPT

## 2022-11-30 PROCEDURE — 82565 ASSAY OF CREATININE: CPT

## 2022-11-30 PROCEDURE — A9579 GAD-BASE MR CONTRAST NOS,1ML: HCPCS | Performed by: NURSE PRACTITIONER

## 2022-11-30 RX ORDER — SODIUM CHLORIDE 0.9 % (FLUSH) 0.9 %
10 SYRINGE (ML) INJECTION PRN
Status: DISCONTINUED | OUTPATIENT
Start: 2022-11-30 | End: 2022-12-03 | Stop reason: HOSPADM

## 2022-11-30 RX ADMIN — GADOTERIDOL 18 ML: 279.3 INJECTION, SOLUTION INTRAVENOUS at 16:00

## 2023-01-02 ENCOUNTER — HOSPITAL ENCOUNTER (EMERGENCY)
Age: 48
Discharge: HOME | End: 2023-01-02
Attending: EMERGENCY MEDICINE
Payer: COMMERCIAL

## 2023-01-02 VITALS
HEART RATE: 85 BPM | OXYGEN SATURATION: 96 % | TEMPERATURE: 97.8 F | HEIGHT: 64 IN | RESPIRATION RATE: 24 BRPM | BODY MASS INDEX: 33.12 KG/M2 | SYSTOLIC BLOOD PRESSURE: 113 MMHG | DIASTOLIC BLOOD PRESSURE: 73 MMHG | WEIGHT: 194 LBS

## 2023-01-02 DIAGNOSIS — R55 SYNCOPE AND COLLAPSE: ICD-10-CM

## 2023-01-02 DIAGNOSIS — R42 DIZZINESS: Primary | ICD-10-CM

## 2023-01-02 LAB
ABSOLUTE EOS #: 0.1 K/UL (ref 0–0.4)
ABSOLUTE LYMPH #: 2.7 K/UL (ref 1–4.8)
ABSOLUTE MONO #: 0.5 K/UL (ref 0.1–1.3)
ALBUMIN SERPL-MCNC: 4 G/DL (ref 3.5–5.2)
ALP BLD-CCNC: 70 U/L (ref 35–104)
ALT SERPL-CCNC: 18 U/L (ref 5–33)
ANION GAP SERPL CALCULATED.3IONS-SCNC: 13 MMOL/L (ref 9–17)
AST SERPL-CCNC: 17 U/L
BASOPHILS # BLD: 1 % (ref 0–2)
BASOPHILS ABSOLUTE: 0 K/UL (ref 0–0.2)
BILIRUB SERPL-MCNC: 0.4 MG/DL (ref 0.3–1.2)
BILIRUBIN URINE: NEGATIVE
BUN BLDV-MCNC: 20 MG/DL (ref 6–20)
CALCIUM SERPL-MCNC: 9.3 MG/DL (ref 8.6–10.4)
CHLORIDE BLD-SCNC: 98 MMOL/L (ref 98–107)
CO2: 25 MMOL/L (ref 20–31)
COLOR: YELLOW
COMMENT UA: NORMAL
CREAT SERPL-MCNC: 0.94 MG/DL (ref 0.5–0.9)
EOSINOPHILS RELATIVE PERCENT: 1 % (ref 0–4)
GFR SERPL CREATININE-BSD FRML MDRD: >60 ML/MIN/1.73M2
GLUCOSE BLD-MCNC: 122 MG/DL (ref 70–99)
GLUCOSE URINE: NEGATIVE
HCT VFR BLD CALC: 39.9 % (ref 36–46)
HEMOGLOBIN: 13.7 G/DL (ref 12–16)
KETONES, URINE: NEGATIVE
LEUKOCYTE ESTERASE, URINE: NEGATIVE
LYMPHOCYTES # BLD: 34 % (ref 24–44)
MAGNESIUM: 1.9 MG/DL (ref 1.6–2.6)
MCH RBC QN AUTO: 29.3 PG (ref 26–34)
MCHC RBC AUTO-ENTMCNC: 34.4 G/DL (ref 31–37)
MCV RBC AUTO: 85.1 FL (ref 80–100)
MONOCYTES # BLD: 6 % (ref 1–7)
NITRITE, URINE: NEGATIVE
PDW BLD-RTO: 13.3 % (ref 11.5–14.9)
PH UA: 6 (ref 5–8)
PLATELET # BLD: 249 K/UL (ref 150–450)
PMV BLD AUTO: 8.4 FL (ref 6–12)
POTASSIUM SERPL-SCNC: 3.7 MMOL/L (ref 3.7–5.3)
PROTEIN UA: NEGATIVE
RBC # BLD: 4.69 M/UL (ref 4–5.2)
SEG NEUTROPHILS: 58 % (ref 36–66)
SEGMENTED NEUTROPHILS ABSOLUTE COUNT: 4.6 K/UL (ref 1.3–9.1)
SODIUM BLD-SCNC: 136 MMOL/L (ref 135–144)
SPECIFIC GRAVITY UA: 1.02 (ref 1–1.03)
TOTAL PROTEIN: 6.5 G/DL (ref 6.4–8.3)
TROPONIN, HIGH SENSITIVITY: <6 NG/L (ref 0–14)
TURBIDITY: CLEAR
URINE HGB: NEGATIVE
UROBILINOGEN, URINE: NORMAL
WBC # BLD: 7.9 K/UL (ref 3.5–11)

## 2023-01-02 PROCEDURE — 83735 ASSAY OF MAGNESIUM: CPT

## 2023-01-02 PROCEDURE — 80053 COMPREHEN METABOLIC PANEL: CPT

## 2023-01-02 PROCEDURE — 85025 COMPLETE CBC W/AUTO DIFF WBC: CPT

## 2023-01-02 PROCEDURE — 99284 EMERGENCY DEPT VISIT MOD MDM: CPT

## 2023-01-02 PROCEDURE — 81003 URINALYSIS AUTO W/O SCOPE: CPT

## 2023-01-02 PROCEDURE — 84484 ASSAY OF TROPONIN QUANT: CPT

## 2023-01-02 PROCEDURE — 36415 COLL VENOUS BLD VENIPUNCTURE: CPT

## 2023-01-02 RX ORDER — MECLIZINE HYDROCHLORIDE 25 MG/1
25 TABLET ORAL 3 TIMES DAILY PRN
Qty: 15 TABLET | Refills: 0 | Status: SHIPPED | OUTPATIENT
Start: 2023-01-02 | End: 2023-01-12

## 2023-01-02 ASSESSMENT — PAIN - FUNCTIONAL ASSESSMENT
PAIN_FUNCTIONAL_ASSESSMENT: NONE - DENIES PAIN
PAIN_FUNCTIONAL_ASSESSMENT: NONE - DENIES PAIN

## 2023-01-02 ASSESSMENT — ENCOUNTER SYMPTOMS
RHINORRHEA: 0
COUGH: 0
DIARRHEA: 1
NAUSEA: 1
VOMITING: 0

## 2023-01-02 ASSESSMENT — LIFESTYLE VARIABLES: HOW OFTEN DO YOU HAVE A DRINK CONTAINING ALCOHOL: NEVER

## 2023-01-02 NOTE — ED PROVIDER NOTES
[address]  EMERGENCY DEPARTMENT ENCOUNTER      Pt Name: Satinder Guerra  MRN: [id number]  [place] 1975  Date of evaluation: 23      CHIEF COMPLAINT       Chief Complaint   Patient presents with    Loss of Consciousness     HISTORY OF PRESENT ILLNESS   HPI 52 y.o. female presents with c/o dizziness. Pt reports that she got up around 5:30am.  She went to take her dog outside, but she felt very lightheaded, dizzy. She felt really weak, she thought she was going to pass out. Her watch keeps track of her HR and it dropped to 52. Speaking with the patient's , he states that she lost all of her color and was very   sweaty. REVIEW OF SYSTEMS       Review of Systems   Constitutional:  Positive for diaphoresis and fatigue. HENT:  Negative for congestion and rhinorrhea. Eyes:  Negative for visual disturbance. Respiratory:  Negative for cough. Cardiovascular:  Negative for chest pain. Gastrointestinal:  Positive for diarrhea (x 1 once this morning) and nausea. Negative for vomiting. Genitourinary:  Negative for dysuria. Musculoskeletal:  Negative for myalgias. Skin:  Negative for rash. Neurological:  Positive for dizziness, light-headedness and numbness (bilateral hands).      PAST MEDICAL HISTORY     Past Medical History:   Diagnosis Date    Headache 2016    Herpes labialis     Hyperlipidemia     Hypertension     Migraine     Uterine leiomyoma        SURGICAL HISTORY       Past Surgical History:   Procedure Laterality Date     SECTION      HYSTERECTOMY (CERVIX STATUS UNKNOWN)  2018    With Removal of Submucous Leiomyomata    LAPAROSCOPY      Excis Endometriotic Tissue Uterosacral Ligaments       CURRENT MEDICATIONS       Discharge Medication List as of 2023 10:42 AM        CONTINUE these medications which have NOT CHANGED    Details   !! lisinopril (PRINIVIL;ZESTRIL) 10 MG tablet Take 1 tablet by mouth daily, Disp-30 tablet, R-5Normal      !! lisinopril (PRINIVIL;ZESTRIL) 20 MG tablet Take 1 tablet by mouth daily, Disp-30 tablet, R-5Normal      ondansetron (ZOFRAN ODT) 4 MG disintegrating tablet Take 1 tablet by mouth every 8 hours as needed for Nausea, Disp-45 tablet, R-1Normal      cycloSPORINE (RESTASIS) 0.05 % ophthalmic emulsion Place 1 drop into both eyes 2 times daily, Disp-1 each, R-0NO PRINT      naratriptan (AMERGE) 2.5 MG tablet Take 1 tablet by mouth once as needed for Migraine 2.5 mg at onset of headache, may repeat in 4 hours if needed, Disp-9 tablet, R-2Normal      atorvastatin (LIPITOR) 20 MG tablet Take 1 tablet by mouth daily, Disp-90 tablet, R-1Normal      omeprazole (PRILOSEC) 40 MG delayed release capsule Take 1 capsule by mouth every morning (before breakfast), Disp-30 capsule, R-5Normal      Multiple Vitamin (MULTI-DAY) TABS Take 1 tablet by mouth daily       ! ! - Potential duplicate medications found. Please discuss with provider. ALLERGIES     has No Known Allergies. FAMILY HISTORY     She indicated that the status of her mother is unknown. She indicated that the status of her father is unknown. She indicated that the status of her sister is unknown. She indicated that the status of her maternal grandmother is unknown. She indicated that the status of her maternal grandfather is unknown. She indicated that the status of her paternal grandmother is unknown. She indicated that the status of her paternal grandfather is unknown. SOCIAL HISTORY      reports that she has never smoked. She has never used smokeless tobacco. She reports current alcohol use. She reports that she does not use drugs.     PHYSICAL EXAM     INITIAL VITALS: /73   Pulse 85   Temp 97.8 °F (36.6 °C) (Oral)   Resp 24   Ht 5' 4\" (1.626 m)   Wt 194 lb (88 kg)   SpO2 96%   BMI 33.30 kg/m²   Gen: nad  Head: Normocephalic, atraumatic  Eye: Pupils equal round reactive to light, no conjunctivitis, no nystagmus  ENT: MMM  Neck: no JVD  Heart: Regular rate and rhythm no murmurs  Lungs: Clear to auscultation bilaterally, no respiratory distress  Chest wall: No crepitus, no tenderness palpation  Abdomen: Soft, nontender, nondistended, with no peritoneal signs  Neurologic: Patient is alert and oriented x3, motor and sensation is intact in all 4 extremities, fluent speech, Cn II - XII intact. Extremities: no edema, no calf tenderness to palpation  MEDICAL DECISION MAKING:     MDM    52 y.o. female presenting with dizziness diaphoresis presyncopal symptoms. Suspect a peripheral vertigo and a vagal syndrome. Differential diagnosis is an atypical presentation of acute coronary syndrome, anemia, dehydration. With her numbness and tingling will make sure that there is no severe electrolyte abnormalities such as hypomagnesemia or hypokalemia. .  Treating with intravenous fluids for improvement of her symptoms as her blood pressure was somewhat low. Also on the differential would be infection such as a urinary tract infection. Considered possible posterior circulation stroke but the temporary nature, the hypotension and bradycardia and the lack of other neurologic findings make this very unlikely. Laboratory studies and EKG ordered. Emergency Department course:  EKG and laboratory studies reviewed. EKG shows no sign of arrhythmia. CBC shows no anemia. Elevated BUN > Cr ratio (>20), pt possibly slightly dehydrated. No other significant electrolyte abnormalities. No troponin elevation or EKG changes to suspect acs. Pt reassessed, she feels fatigued but no further dizziness or or presyncope. BP reassessed and is improved. D/w pt the results,  I reviewed with her  warning precautions for prompt ED return and importance of close OP FU, I sent a prescription for meclizine as I think she likely had peripheral vertigo. She verbalizes understanding and agrees with the treatment plan.      DIAGNOSTIC RESULTS     EKG: All EKG's are interpreted by the Emergency Department Physician who either signs or Co-signs this chart in the absence of a cardiologist.    EKG shows a sinus rhythm. HR is 71, , QRS 76, , no FLAKO, No STD, TWI, the axis is normal.      RADIOLOGY:All plain film, CT, MRI, and formal ultrasound images (except ED bedside ultrasound) are read by the radiologist and the images and interpretations are directly viewed by the emergency physician. No orders to display       LABS: All lab results were reviewed by myself, and all abnormals are listed below. Labs Reviewed   COMPREHENSIVE METABOLIC PANEL - Abnormal; Notable for the following components:       Result Value    Glucose 122 (*)     Creatinine 0.94 (*)     All other components within normal limits   CBC WITH AUTO DIFFERENTIAL   MAGNESIUM   URINALYSIS WITH REFLEX TO CULTURE   TROPONIN       EMERGENCY DEPARTMENT COURSE:   Vitals:    Vitals:    01/02/23 0729 01/02/23 0734 01/02/23 0744 01/02/23 1016   BP: 106/63 106/63 114/78 113/73   Pulse: 75 70 90 85   Resp: 18 23 24    Temp:       TempSrc:       SpO2: 96% 94% 96%    Weight:       Height:           The patient was given the following medications while in the emergency department:  Orders Placed This Encounter   Medications    meclizine (ANTIVERT) 25 MG tablet     Sig: Take 1 tablet by mouth 3 times daily as needed for Dizziness     Dispense:  15 tablet     Refill:  0     -------------------------  CRITICAL CARE:   CONSULTS: None  PROCEDURES: Procedures     FINAL IMPRESSION      1. Dizziness    2.  Syncope and collapse          DISPOSITION/PLAN   DISPOSITION Decision To Discharge 01/02/2023 10:39:00 AM      PATIENT REFERRED TO:  Homer Davalos PA-C  [address] [phone]    In 2 days      [facility] ED  [facility] [address] [place] [address]  [phone]    If symptoms worsen    DISCHARGE MEDICATIONS:  Discharge Medication List as of 1/2/2023 10:42 AM        START taking these medications Details   meclizine (ANTIVERT) 25 MG tablet Take 1 tablet by mouth 3 times daily as needed for Dizziness, Disp-15 tablet, R-0Normal               Jose Elizabeth MD  Attending Emergency Physician                      Jose Elizabeth MD  01/02/23 2043

## 2023-01-02 NOTE — ED NOTES
The following labs labeled with pt sticker and tubed to lab:     [x] Blue     [x] Lavender   [] on ice  [x] Green/yellow  [] Green/black [] on ice  [] Yellow  [] Red  [] Pink      [] COVID-19 swab    [] Rapid  [] PCR  [] Flu swab  [] Peds Viral Panel     [] Urine Sample  [] Pelvic Cultures  [] Blood Cultures          Aimee Bustos RN  01/02/23 1700

## 2023-01-02 NOTE — ED NOTES
Discharge instructions reviewed with patient, noting all directions and education by provider. Patient verbalizes understanding of all information reviewed, gathered personal items, and transferred under own power off unit to lobby without incident.      Merita Merlin, RN  01/02/23 1989

## 2023-01-02 NOTE — ED TRIAGE NOTES
Mode of arrival (squad #, walk in, police, etc) : EMS        Chief complaint(s): Near Syncope        Arrival Note (brief scenario, treatment PTA, etc). : Pt states she was taking her dogs out side and things she got up to fast. Pt states she did not fully pass out but did sit down and felt dizzy and saw \"stars\"        C= \"Have you ever felt that you should Cut down on your drinking? \"  No  A= \"Have people Annoyed you by criticizing your drinking? \"  No  G= \"Have you ever felt bad or Guilty about your drinking? \"  No  E= \"Have you ever had a drink as an Eye-opener first thing in the morning to steady your nerves or to help a hangover? \"  No      Deferred []      Reason for deferring: N/A    *If yes to two or more: probable alcohol abuse. *

## 2023-01-06 LAB
EKG ATRIAL RATE: 71 BPM
EKG P AXIS: 15 DEGREES
EKG P-R INTERVAL: 152 MS
EKG Q-T INTERVAL: 410 MS
EKG QRS DURATION: 76 MS
EKG QTC CALCULATION (BAZETT): 445 MS
EKG R AXIS: -2 DEGREES
EKG T AXIS: -2 DEGREES
EKG VENTRICULAR RATE: 71 BPM

## 2023-01-16 DIAGNOSIS — I10 ESSENTIAL HYPERTENSION: ICD-10-CM

## 2023-01-16 RX ORDER — LISINOPRIL 20 MG/1
20 TABLET ORAL DAILY
Qty: 30 TABLET | Refills: 0 | Status: SHIPPED | OUTPATIENT
Start: 2023-01-16 | End: 2023-01-19 | Stop reason: ALTCHOICE

## 2023-01-16 RX ORDER — OMEPRAZOLE 40 MG/1
40 CAPSULE, DELAYED RELEASE ORAL
Qty: 30 CAPSULE | Refills: 0 | Status: SHIPPED | OUTPATIENT
Start: 2023-01-16 | End: 2023-02-27

## 2023-01-16 RX ORDER — LISINOPRIL 10 MG/1
10 TABLET ORAL DAILY
Qty: 30 TABLET | Refills: 0 | Status: SHIPPED | OUTPATIENT
Start: 2023-01-16 | End: 2023-01-19 | Stop reason: ALTCHOICE

## 2023-01-19 ENCOUNTER — OFFICE VISIT (OUTPATIENT)
Dept: PRIMARY CARE CLINIC | Age: 48
End: 2023-01-19
Payer: COMMERCIAL

## 2023-01-19 VITALS
SYSTOLIC BLOOD PRESSURE: 120 MMHG | HEART RATE: 97 BPM | OXYGEN SATURATION: 97 % | WEIGHT: 201.6 LBS | DIASTOLIC BLOOD PRESSURE: 80 MMHG | BODY MASS INDEX: 34.6 KG/M2

## 2023-01-19 DIAGNOSIS — R55 VASOVAGAL EPISODE: ICD-10-CM

## 2023-01-19 PROCEDURE — 3074F SYST BP LT 130 MM HG: CPT | Performed by: PHYSICIAN ASSISTANT

## 2023-01-19 PROCEDURE — 3079F DIAST BP 80-89 MM HG: CPT | Performed by: PHYSICIAN ASSISTANT

## 2023-01-19 PROCEDURE — 99214 OFFICE O/P EST MOD 30 MIN: CPT | Performed by: PHYSICIAN ASSISTANT

## 2023-01-19 RX ORDER — LISINOPRIL AND HYDROCHLOROTHIAZIDE 20; 12.5 MG/1; MG/1
TABLET ORAL
Qty: 90 TABLET | Refills: 0 | Status: SHIPPED | OUTPATIENT
Start: 2023-01-19

## 2023-01-19 ASSESSMENT — PATIENT HEALTH QUESTIONNAIRE - PHQ9
SUM OF ALL RESPONSES TO PHQ9 QUESTIONS 1 & 2: 0
6. FEELING BAD ABOUT YOURSELF - OR THAT YOU ARE A FAILURE OR HAVE LET YOURSELF OR YOUR FAMILY DOWN: 0
8. MOVING OR SPEAKING SO SLOWLY THAT OTHER PEOPLE COULD HAVE NOTICED. OR THE OPPOSITE, BEING SO FIGETY OR RESTLESS THAT YOU HAVE BEEN MOVING AROUND A LOT MORE THAN USUAL: 0
SUM OF ALL RESPONSES TO PHQ QUESTIONS 1-9: 1
4. FEELING TIRED OR HAVING LITTLE ENERGY: 1
2. FEELING DOWN, DEPRESSED OR HOPELESS: 0
7. TROUBLE CONCENTRATING ON THINGS, SUCH AS READING THE NEWSPAPER OR WATCHING TELEVISION: 0
1. LITTLE INTEREST OR PLEASURE IN DOING THINGS: 0
10. IF YOU CHECKED OFF ANY PROBLEMS, HOW DIFFICULT HAVE THESE PROBLEMS MADE IT FOR YOU TO DO YOUR WORK, TAKE CARE OF THINGS AT HOME, OR GET ALONG WITH OTHER PEOPLE: 0
SUM OF ALL RESPONSES TO PHQ QUESTIONS 1-9: 1
5. POOR APPETITE OR OVEREATING: 0
3. TROUBLE FALLING OR STAYING ASLEEP: 0
SUM OF ALL RESPONSES TO PHQ QUESTIONS 1-9: 1
9. THOUGHTS THAT YOU WOULD BE BETTER OFF DEAD, OR OF HURTING YOURSELF: 0
SUM OF ALL RESPONSES TO PHQ QUESTIONS 1-9: 1

## 2023-01-19 ASSESSMENT — ENCOUNTER SYMPTOMS
SHORTNESS OF BREATH: 1
WHEEZING: 0
COUGH: 0
GASTROINTESTINAL NEGATIVE: 1

## 2023-01-19 NOTE — PROGRESS NOTES
[facility]  [address] [place] [id number]  Dept: [phone]    Cat Storey is a 52 y.o. female who presents today for her medical conditions/complaints as noted below. Chief Complaint   Patient presents with    Loss of Consciousness     Had an episode of near syncope-none since. Feels well. HPI:     HPI  Was seen again in ER for a syncopal spell 23 diagnosed as  likely vasovagal episode    Episode: Early in morning got up to let her dog out, became dizzy, really hot, nauseous, felt like passing out. Never LOC. Hands cramped up. HR dropped to 52, not sure what BP was. Called EMTs ---did walk down the stairs to wait for them.  Her sugar was apparently normal.  She does not recall there BP, but ER said her BP was quite low on arrival.   She denies having CP, SOB, HA before or during the episode     Seeing neurology   Has had cardiac work-up in past when this happened--normal  No further episodes  LDL Cholesterol (mg/dL)   Date Value   2022 192 (H)   2021 138 (H)   12/15/2020 164 (H)     LDL Calculated (mg/dL)   Date Value   2018 123   2017 180 (A)       (goal LDL is <100)   AST (U/L)   Date Value   2023 17     ALT (U/L)   Date Value   2023 18     BUN (mg/dL)   Date Value   2023 20     BP Readings from Last 3 Encounters:   23 120/80   23 113/73   10/25/22 133/84          (goal 120/80)    Past Medical History:   Diagnosis Date    Headache 2016    Herpes labialis     Hyperlipidemia     Hypertension     Migraine     Uterine leiomyoma       Past Surgical History:   Procedure Laterality Date     SECTION      HYSTERECTOMY (CERVIX STATUS UNKNOWN)  2018    With Removal of Submucous Leiomyomata    LAPAROSCOPY      Excis Endometriotic Tissue Uterosacral Ligaments       Family History   Problem Relation Age of Onset    Other Mother         Cluster Headaches; Fibromyalgia    Heart Disease Mother         High blood pressure, diabetes, stroke, heart attacks, chf death at 76    High Blood Pressure Mother     Depression Mother     Diabetes Mother     Stroke Mother     Coronary Art Dis Mother     Heart Attack Mother     High Cholesterol Mother     Mental Illness Mother     Other Father         Alcoholism, COPD    Alcohol Abuse Father     Other Sister         Ovarian Neoplasm    Cancer Maternal Grandmother         breast    Breast Cancer Maternal Grandmother     Heart Disease Maternal Grandfather         Massive heart attack early death 42s    Cancer Paternal Grandmother         breast    Stroke Paternal Grandmother     Arthritis Paternal Grandmother     Breast Cancer Paternal Grandmother     Cancer Paternal Grandfather         lung       Social History     Tobacco Use    Smoking status: Never    Smokeless tobacco: Never    Tobacco comments:     Never   Substance Use Topics    Alcohol use: Yes      Current Outpatient Medications   Medication Sig Dispense Refill    lisinopril-hydroCHLOROthiazide (PRINZIDE;ZESTORETIC) 20-12.5 MG per tablet TAKE 1 TABLET DAILY 90 tablet 0    omeprazole (PRILOSEC) 40 MG delayed release capsule Take 1 capsule by mouth every morning (before breakfast) 30 capsule 0    ondansetron (ZOFRAN ODT) 4 MG disintegrating tablet Take 1 tablet by mouth every 8 hours as needed for Nausea 45 tablet 1    cycloSPORINE (RESTASIS) 0.05 % ophthalmic emulsion Place 1 drop into both eyes 2 times daily 1 each 0    Multiple Vitamin (MULTI-DAY) TABS Take 1 tablet by mouth daily      naratriptan (AMERGE) 2.5 MG tablet Take 1 tablet by mouth once as needed for Migraine 2.5 mg at onset of headache, may repeat in 4 hours if needed 9 tablet 2    atorvastatin (LIPITOR) 20 MG tablet Take 1 tablet by mouth daily (Patient not taking: Reported on 1/19/2023) 90 tablet 1     No current facility-administered medications for this visit.      No Known Allergies    Health Maintenance   Topic Date Due    HIV screen  Never done    Hepatitis C screen  Never done    Colorectal Cancer Screen  Never done    COVID-19 Vaccine (4 - Booster for Pfizer series) 01/05/2022    Flu vaccine (1) 08/01/2022    Lipids  06/06/2023    Depression Monitoring  06/06/2023    Diabetes screen  06/06/2025    DTaP/Tdap/Td vaccine (2 - Td or Tdap) 11/19/2028    Hepatitis A vaccine  Aged Out    Hib vaccine  Aged Out    Meningococcal (ACWY) vaccine  Aged Out    Pneumococcal 0-64 years Vaccine  Aged Out       Subjective:      Review of Systems   Constitutional:  Negative for chills, diaphoresis, fatigue and fever. Respiratory:  Positive for shortness of breath (with going up stairs due to weight). Negative for cough and wheezing. Cardiovascular:  Negative for chest pain, palpitations and leg swelling. Still has pressure on left side of upper chest   Gastrointestinal: Negative. Endocrine: Negative. Genitourinary: Negative. Neurological:  Negative for dizziness, syncope, light-headedness and headaches. Psychiatric/Behavioral:  The patient is not nervous/anxious. Objective:     /80   Pulse 97   Wt 201 lb 9.6 oz (91.4 kg)   SpO2 97%   BMI 34.60 kg/m²   Physical Exam  Vitals and nursing note reviewed. Constitutional:       Appearance: Normal appearance. She is obese. Cardiovascular:      Rate and Rhythm: Normal rate and regular rhythm. Heart sounds: Normal heart sounds. Pulmonary:      Effort: Pulmonary effort is normal.      Breath sounds: Normal breath sounds. Musculoskeletal:      Right lower leg: No edema. Left lower leg: No edema. Neurological:      Mental Status: She is alert and oriented to person, place, and time. Psychiatric:         Mood and Affect: Mood normal.       Assessment:       Diagnosis Orders   1.  Vasovagal episode           Unknown etiology    Plan:    I did ask her to talk to neurology about this as the clenched hands do not go along with vasovagal.   REviewed EKG, labs and ER notes. Hydrate well, decreased caffeine. No follow-ups on file. No orders of the defined types were placed in this encounter. Orders Placed This Encounter   Medications    lisinopril-hydroCHLOROthiazide (PRINZIDE;ZESTORETIC) 20-12.5 MG per tablet     Sig: TAKE 1 TABLET DAILY     Dispense:  90 tablet     Refill:  0     This is the Lisinopril she takes, not the the Lisinopril by itself. Patient given educational materials - see patient instructions. Discussed use, benefit, and side effects of prescribed medications. All patient questions answered. Pt voiced understanding. Reviewed health maintenance. Instructed to continue current medications, diet and exercise. Patient agreed with treatment plan. Follow up as directed.      Electronically signed by Mary Dillon PA-C on 1/19/2023 at 5:51 PM

## 2023-01-26 ENCOUNTER — OFFICE VISIT (OUTPATIENT)
Dept: NEUROLOGY | Age: 48
End: 2023-01-26
Payer: COMMERCIAL

## 2023-01-26 VITALS
SYSTOLIC BLOOD PRESSURE: 141 MMHG | DIASTOLIC BLOOD PRESSURE: 85 MMHG | BODY MASS INDEX: 33.97 KG/M2 | WEIGHT: 199 LBS | HEIGHT: 64 IN | HEART RATE: 81 BPM

## 2023-01-26 DIAGNOSIS — M54.12 CERVICAL RADICULOPATHY: Primary | ICD-10-CM

## 2023-01-26 DIAGNOSIS — R20.2 NUMBNESS AND TINGLING IN BOTH HANDS: ICD-10-CM

## 2023-01-26 DIAGNOSIS — M54.81 OCCIPITAL NEURALGIA OF LEFT SIDE: ICD-10-CM

## 2023-01-26 DIAGNOSIS — R20.0 NUMBNESS AND TINGLING IN BOTH HANDS: ICD-10-CM

## 2023-01-26 DIAGNOSIS — G43.009 MIGRAINE WITHOUT AURA AND WITHOUT STATUS MIGRAINOSUS, NOT INTRACTABLE: ICD-10-CM

## 2023-01-26 DIAGNOSIS — R55 VASOVAGAL EPISODE: ICD-10-CM

## 2023-01-26 PROCEDURE — 3079F DIAST BP 80-89 MM HG: CPT | Performed by: NURSE PRACTITIONER

## 2023-01-26 PROCEDURE — 99214 OFFICE O/P EST MOD 30 MIN: CPT | Performed by: NURSE PRACTITIONER

## 2023-01-26 PROCEDURE — 3077F SYST BP >= 140 MM HG: CPT | Performed by: NURSE PRACTITIONER

## 2023-01-26 NOTE — PROGRESS NOTES
[facility]            [address]          [place], [address] [facility]          Dept: [phone]          Dept Fax: [phone]    MD Alfreda Abraham MD Tomas Goldstein, MD Eladio Null, CNP            1/26/2023      HISTORY OF PRESENT ILLNESS:       I had the pleasure of seeing John Zambrano, who returns for continuing neurologic care. The patient was seen last on October 25, 2022 for treatment of a left occipital neuralgia and paresthesias of the bilateral upper extremities. For management of her left occipital neuralgia she received a nerve block on November 4, 2022. This was helpful in alleviating her pain    For management of the paresthesias in her bilateral upper extremities EMG/NCV studies in October 2022 was normal. She also reported an episode of left visual loss with associated pain of the left eye and was referred to Dr. Tyrone Gill, neurophthalmology. MRI of her cervical spine W WO contrast November 30, 2022 showed degenerative changes however nothing to explain her symptoms. She reports today that she has noticed improvements of her paresthesias. On January 3, 2023 she went to the hospital with a possible syncopal episode. She woke up at 5:30 AM to let her dogs out and when she went downstairs and became lightheaded and diaphoretic. She then told her  that she felt off and lost consciousness. She noted that her heart rate was slow during the event. She also had an episode where her hands started to curl inward and lock up which would be consistent with carpopedal spasm secondary to hyperventilation. Her hospital evaluation found her glucose and creatinine level to be elevated. Since the hospitalization that has been no recurrence of the syncope. Testing reviewed:    MRI Cervical Spine W WO Contrast 11/30/2022  Impression   1.  Multilevel degenerative change with moderate spinal canal narrowing at   C5-6 and mild spinal canal narrowing at C6-7.   2. Multilevel neural foraminal narrowing as above. 3. Straightening of the normal cervical lordosis. 4. No abnormal enhancement. EMG/NCV Bilateral Upper Extremities 10/25/2022  Assessment:  There is currently no electrophysiologic evidence of a significant upper extremity radiculopathy, plexopathy, myopathy, peripheral polyneuropathy of the wrist such as that seen in carpal tunnel syndrome noted at this time. MRI Brain W WO Contrast 8/15/2022  Impression   Unremarkable pre and post-contrast MRI of the brain. MRI cervical spine 3/11/22  Impression   1. Mild spinal canal stenosis and moderate bilateral neural foraminal   narrowing at C5-6 secondary to a disc bulge. 2. Disc bulges at C4-5 and C6-7 mildly effacing the ventral aspect of the   thecal sac but without significant spinal canal stenosis or neural foraminal   narrowing. Laboratory testing completed 2022  ROCIO NEGATIVE NEGATIVE       CRP 0.0 - 5.0 mg/L <3.0       Sed Rate 0 - 20 mm/Hr 7      Rheumatoid Factor <14 IU/mL <10       Vitamin B-12 232 - 1245 pg/mL 706       PAST MEDICAL HISTORY:         Diagnosis Date    Headache 2016    Herpes labialis     Hyperlipidemia     Hypertension     Migraine     Uterine leiomyoma         PAST SURGICAL HISTORY:         Procedure Laterality Date     SECTION      HYSTERECTOMY (CERVIX STATUS UNKNOWN)  2018    With Removal of Submucous Leiomyomata    LAPAROSCOPY      Excis Endometriotic Tissue Uterosacral Ligaments        SOCIAL HISTORY:     Social History     Socioeconomic History    Marital status:      Spouse name: Not on file    Number of children: Not on file    Years of education: Not on file    Highest education level: Not on file   Occupational History    Not on file   Tobacco Use    Smoking status: Never    Smokeless tobacco: Never    Tobacco comments:     Never   Substance and Sexual Activity    Alcohol use: Yes    Drug use:  No Sexual activity: Yes     Partners: Male     Comment: Spouse   Other Topics Concern    Not on file   Social History Narrative    Not on file     Social Determinants of Health     Financial Resource Strain: Low Risk     Difficulty of Paying Living Expenses: Not hard at all   Food Insecurity: No Food Insecurity    Worried About Running Out of Food in the Last Year: Never true    Ran Out of Food in the Last Year: Never true   Transportation Needs: Not on file   Physical Activity: Not on file   Stress: Not on file   Social Connections: Not on file   Intimate Partner Violence: Not on file   Housing Stability: Not on file       CURRENT MEDICATIONS:     Current Outpatient Medications   Medication Sig Dispense Refill    lisinopril-hydroCHLOROthiazide (PRINZIDE;ZESTORETIC) 20-12.5 MG per tablet TAKE 1 TABLET DAILY 90 tablet 0    omeprazole (PRILOSEC) 40 MG delayed release capsule Take 1 capsule by mouth every morning (before breakfast) 30 capsule 0    ondansetron (ZOFRAN ODT) 4 MG disintegrating tablet Take 1 tablet by mouth every 8 hours as needed for Nausea 45 tablet 1    cycloSPORINE (RESTASIS) 0.05 % ophthalmic emulsion Place 1 drop into both eyes 2 times daily 1 each 0    naratriptan (AMERGE) 2.5 MG tablet Take 1 tablet by mouth once as needed for Migraine 2.5 mg at onset of headache, may repeat in 4 hours if needed 9 tablet 2    Multiple Vitamin (MULTI-DAY) TABS Take 1 tablet by mouth daily      atorvastatin (LIPITOR) 20 MG tablet Take 1 tablet by mouth daily (Patient not taking: No sig reported) 90 tablet 1     No current facility-administered medications for this visit. ALLERGIES:   No Known Allergies                              REVIEW OF SYSTEMS        All items selected indicate a positive finding. Those items not selected are negative.   Constitutional [] Weight loss/gain   [] Fatigue  [] Fever/Chills   HEENT [] Hearing Loss  [] Visual Disturbance  [] Tinnitus  [] Eye pain   Respiratory [] Shortness of Breath  [] Cough  [] Snoring   Cardiovascular [] Chest Pain  [] Palpitations  [x] Lightheaded   GI [] Constipation  [] Diarrhea  [] Swallowing change  [] Nausea/vomiting    [] Urinary Frequency  [] Urinary Urgency   Musculoskeletal [] Neck pain  [] Back pain  [] Muscle pain  [] Restless legs   Dermatologic [] Skin changes   Neurologic [] Memory loss/confusion  [] Seizures  [] Trouble walking or imbalance  [] Dizziness  [] Sleep disturbance  [] Weakness  [x] Numbness  [] Tremors  [] Speech Difficulty  [x] Headaches  [] Light Sensitivity  [] Sound Sensitivity   Endocrinology []Excessive thirst  []Excessive hunger   Psychiatric [] Anxiety/Depression  [] Hallucination   Allergy/immunology []Hives/environmental allergies   Hematologic/lymph [] Abnormal bleeding  [] Abnormal bruising         PHYSICAL EXAMINATION:       Vitals:    01/26/23 1449   BP: (!) 145/90   Pulse: 90                                              .                                                                                                    General Appearance:  Alert, cooperative, no signs of distress, appears stated age   Head:  Normocephalic, no signs of trauma   Eyes:  Conjunctiva/corneas clear;  eyelids intact   Ears:  Normal external ear and canals   Nose: Nares normal, mucosa normal, no drainage    Throat: Lips and tongue normal; teeth normal;  gums normal   Neck: Supple, intact flexion, extension and rotation;   trachea midline;  no adenopathy;   thyroid: not enlarged;   no carotid pulse abnormality   Back:   Symmetric, no curvature, ROM adequate   Lungs:   Respirations unlabored   Heart:  Regular rate and rhythm           Extremities: Extremities normal, no cyanosis, no edema   Pulses: Symmetric over head and neck   Skin: Skin color, texture normal, no rashes, no lesions                                     NEUROLOGIC EXAMINATION    Neurologic Exam  Mental status    Alert and oriented x 3; intact memory with no confusion, speech or language problems; no hallucinations or delusions  Fund of information appropriate for level of education    Cranial nerves    II - visual fields intact to confrontation bilaterally  III, IV, VI - extra-ocular muscles full: no pupillary defect; no ALEKSANDAR, no nystagmus, no ptosis   V - normal facial sensation                                                               VII - normal facial symmetry                                                             VIII - intact hearing                                                                             IX, X - symmetrical palate                                                                  XI - symmetrical shoulder shrug                                                       XII - tongue midline without atrophy or fasciculation      Motor function  Normal muscle bulk and tone; strength 5/5 on all 4 extremities, no pronator drift      Sensory function Intact to light touch, pinprick, vibration, proprioception on all 4 extremities      Cerebellar Intact fine motor movement. No involuntary movements or tremors. No ataxia or dysmetria on finger to nose or heel to shin testing      Reflex function DTR 2+ on bilateral UE and LE, symmetric. Down going toes bilaterally      Gait                   normal base and arm swing                  Medical Decision Making: In summary, your patient, Bernard Khalil exhibits the following, with associated plan:    Left occipital neuralgia which has significantly improved after occipital nerve blocks in November 2022  She received bilateral occipital nerve blocks on November 4, 2022  Recommended she schedule an appointment for a repeat nerve block if her occipital pain returns   Paresthesias of the bilateral upper extremities and a recent episode of left visual loss with associated pain of the left eye which has yet to resolve.  She has a history of neck pain which may indicate a cervical etiology for her paresthesias versus a possible neuromyelitis optica spectrum disorder, which is known to cause spinal lesions accompanied by an optic neuritis. . She previously developed a trigeminal neuralgia which is likely secondary to a dental procedure and resolved with use of gabapentin. A referral to Dr. Jung Thomas, neurophthalmology, was made at her last visit. I encouraged her to follow through at today's visit. EMG/NCV studies completed in October 2022 were normal   MRI of the cervical spine W WO contrast in October 2022 showed degenerative changes but nothing to explain her symptoms  Recent syncopal episode associated with a carpal pedal spasm   Recommended she begin to drink at least 60 ounces of water and an 8 ounce gatorade daily  Also recommended she begin to wear compression stockings   Return for follow up visit in 4 months             Signed: Yue Bernstein CNP      *Please note that portions of this note were completed with a voice recognition program.  Although every effort was made to insure the accuracy of this automated transcription, some errors in transcription may have occurred, occasionally words and are mis-transcribed    Provider Attestation: The documentation recorded by the scribe accurately reflects the service I personally performed and the decisions made by myself. Portions of this note were transcribed by a scribe. I personally performed the history, physical exam, and medical decision-making and confirm the accuracy of the information in the transcribed note. Scribe Attestation:   By signing my name below, Max Bowman, attest that this documentation has been prepared under the direction and in the presence of Yue Bernstein CNP.

## 2023-02-27 RX ORDER — OMEPRAZOLE 40 MG/1
CAPSULE, DELAYED RELEASE ORAL
Qty: 30 CAPSULE | Refills: 5 | Status: SHIPPED | OUTPATIENT
Start: 2023-02-27

## 2023-03-08 ENCOUNTER — TELEPHONE (OUTPATIENT)
Dept: GASTROENTEROLOGY | Age: 48
End: 2023-03-08

## 2023-03-08 ENCOUNTER — OFFICE VISIT (OUTPATIENT)
Dept: GASTROENTEROLOGY | Age: 48
End: 2023-03-08
Payer: COMMERCIAL

## 2023-03-08 ENCOUNTER — HOSPITAL ENCOUNTER (OUTPATIENT)
Age: 48
Setting detail: SPECIMEN
Discharge: HOME | End: 2023-03-08

## 2023-03-08 VITALS
WEIGHT: 200.2 LBS | BODY MASS INDEX: 34.18 KG/M2 | SYSTOLIC BLOOD PRESSURE: 136 MMHG | HEIGHT: 64 IN | DIASTOLIC BLOOD PRESSURE: 81 MMHG

## 2023-03-08 DIAGNOSIS — R11.0 NAUSEA: ICD-10-CM

## 2023-03-08 DIAGNOSIS — R10.13 DYSPEPSIA: ICD-10-CM

## 2023-03-08 DIAGNOSIS — R10.13 DYSPEPSIA: Primary | ICD-10-CM

## 2023-03-08 DIAGNOSIS — R19.8 RECTAL DISCHARGE: ICD-10-CM

## 2023-03-08 PROCEDURE — 3075F SYST BP GE 130 - 139MM HG: CPT | Performed by: INTERNAL MEDICINE

## 2023-03-08 PROCEDURE — 3079F DIAST BP 80-89 MM HG: CPT | Performed by: INTERNAL MEDICINE

## 2023-03-08 RX ORDER — ONDANSETRON 4 MG/1
4 TABLET, FILM COATED ORAL DAILY PRN
Qty: 30 TABLET | Refills: 0 | Status: SHIPPED | OUTPATIENT
Start: 2023-03-08

## 2023-03-08 RX ORDER — OMEPRAZOLE 20 MG/1
20 TABLET, DELAYED RELEASE ORAL DAILY
Qty: 30 TABLET | Refills: 3 | Status: SHIPPED | OUTPATIENT
Start: 2023-03-08

## 2023-03-08 RX ORDER — SIMETHICONE 80 MG
80 TABLET,CHEWABLE ORAL 4 TIMES DAILY PRN
Qty: 180 TABLET | Refills: 3 | Status: SHIPPED | OUTPATIENT
Start: 2023-03-08

## 2023-03-08 ASSESSMENT — ENCOUNTER SYMPTOMS
TROUBLE SWALLOWING: 1
ABDOMINAL PAIN: 0
SHORTNESS OF BREATH: 0
RESPIRATORY NEGATIVE: 1
EYES NEGATIVE: 1
NAUSEA: 0
CHOKING: 0
ANAL BLEEDING: 0
DIARRHEA: 1
SORE THROAT: 1
WHEEZING: 0
COUGH: 0
BLOOD IN STOOL: 0
CONSTIPATION: 1
VOMITING: 1
VOICE CHANGE: 0
ABDOMINAL DISTENTION: 1
RECTAL PAIN: 0

## 2023-03-08 NOTE — PROGRESS NOTES
Reason for Referral:  Dyspepsia       Gera Sawyer PA-C  [address]  [place],  [address]    Chief Complaint   Patient presents with    New Patient     Bilious vomiting            HISTORY OF PRESENT ILLNESS: Osmar German is a 50 y.o. female with a past history remarkable for hypertension, migraine, obesity, uterine leiomyoma, hysterectomy, referred for evaluation of intermittent bilious vomiting. The patient is unable to identify any obvious dietary triggers. No recent upper endoscopy or colonoscopy. Smoker: None   Drinking history: None   Illicit drugs:  None   Abdominal surgeries: Hysterectomy, Lap IUD removal, adhesions. Prior Colonoscopy: None   Prior EGD: None   FH of GI issues: None       Past Medical,Family, and Social History reviewed and does contribute to the patient presentingcondition. Patient's PMH/PSH,SH,PSYCH Hx, MEDs, ALLERGIES, and ROS were all reviewed and updated in the appropriate sections.     PAST MEDICAL HISTORY:  Past Medical History:   Diagnosis Date    Headache 2016    Herpes labialis     Hyperlipidemia     Hypertension     Migraine     Uterine leiomyoma        Past Surgical History:   Procedure Laterality Date     SECTION      HYSTERECTOMY (CERVIX STATUS UNKNOWN)  2018    With Removal of Submucous Leiomyomata    LAPAROSCOPY      Excis Endometriotic Tissue Uterosacral Ligaments       CURRENT MEDICATIONS:    Current Outpatient Medications:     omeprazole (PRILOSEC OTC) 20 MG tablet, Take 1 tablet by mouth daily, Disp: 30 tablet, Rfl: 3    ondansetron (ZOFRAN) 4 MG tablet, Take 1 tablet by mouth daily as needed for Nausea or Vomiting, Disp: 30 tablet, Rfl: 0    simethicone (MYLICON) 80 MG chewable tablet, Take 1 tablet by mouth 4 times daily as needed for Flatulence, Disp: 180 tablet, Rfl: 3    omeprazole (PRILOSEC) 40 MG delayed release capsule, TAKE 1 CAPSULE BY MOUTH IN THE MORNING BEFORE BREAKFAST, Disp: 30 capsule, Rfl: 5 lisinopril-hydroCHLOROthiazide (PRINZIDE;ZESTORETIC) 20-12.5 MG per tablet, TAKE 1 TABLET DAILY, Disp: 90 tablet, Rfl: 0    cycloSPORINE (RESTASIS) 0.05 % ophthalmic emulsion, Place 1 drop into both eyes 2 times daily, Disp: 1 each, Rfl: 0    Multiple Vitamin (MULTI-DAY) TABS, Take 1 tablet by mouth daily, Disp: , Rfl:     naratriptan (AMERGE) 2.5 MG tablet, Take 1 tablet by mouth once as needed for Migraine 2.5 mg at onset of headache, may repeat in 4 hours if needed, Disp: 9 tablet, Rfl: 2    atorvastatin (LIPITOR) 20 MG tablet, Take 1 tablet by mouth daily (Patient not taking: Reported on 3/8/2023), Disp: 90 tablet, Rfl: 1    ALLERGIES:   No Known Allergies    FAMILY HISTORY:       Problem Relation Age of Onset    Other Mother         Cluster Headaches; Fibromyalgia    Heart Disease Mother         High blood pressure, diabetes, stroke, heart attacks, chf death at 76    High Blood Pressure Mother     Depression Mother     Diabetes Mother     Stroke Mother     Coronary Art Dis Mother     Heart Attack Mother     High Cholesterol Mother     Mental Illness Mother     Other Father         Alcoholism, COPD    Alcohol Abuse Father     Other Sister         Ovarian Neoplasm    Cancer Maternal Grandmother         breast    Breast Cancer Maternal Grandmother     Heart Disease Maternal Grandfather         Massive heart attack early death 42s    Cancer Paternal Grandmother         breast    Stroke Paternal Grandmother     Arthritis Paternal Grandmother     Breast Cancer Paternal Grandmother     Cancer Paternal Grandfather         lung         SOCIAL HISTORY:   Social History     Socioeconomic History    Marital status:      Spouse name: Not on file    Number of children: Not on file    Years of education: Not on file    Highest education level: Not on file   Occupational History    Not on file   Tobacco Use    Smoking status: Never    Smokeless tobacco: Never    Tobacco comments:     Never   Substance and Sexual Activity    Alcohol use: Yes    Drug use: No    Sexual activity: Yes     Partners: Male     Comment: Spouse   Other Topics Concern    Not on file   Social History Narrative    Not on file     Social Determinants of Health     Financial Resource Strain: Low Risk     Difficulty of Paying Living Expenses: Not hard at all   Food Insecurity: No Food Insecurity    Worried About Running Out of Food in the Last Year: Never true    Ran Out of Food in the Last Year: Never true   Transportation Needs: Not on file   Physical Activity: Not on file   Stress: Not on file   Social Connections: Not on file   Intimate Partner Violence: Not on file   Housing Stability: Not on file         REVIEW OF SYSTEMS: A 12-point review of systems was obtained and pertinent positives and negatives were listed below. REVIEW OF SYSTEMS:     Constitutional: No fever, no chills, no lethargy, no weakness. HEENT:  No headache, otalgia, itchy eyes, nasal discharge or sore throat. Cardiac:  No chest pain, dyspnea, orthopnea or PND. Chest:   No cough, phlegm or wheezing. Abdomen:      Detailed by MA   Neuro:  No focal weakness, abnormal movements or seizure like activity. Skin:   No rashes, no itching. :   No hematuria, no pyuria, no dysuria, no flank pain. Extremities:  No swelling or joint pains. ROS was otherwise negative    Review of Systems   Constitutional: Negative. Negative for appetite change, fatigue and unexpected weight change. HENT:  Positive for sore throat and trouble swallowing. Negative for voice change. Eyes: Negative. Negative for visual disturbance. Respiratory: Negative. Negative for cough, choking, shortness of breath and wheezing. Cardiovascular:  Positive for chest pain. Negative for palpitations and leg swelling. Gastrointestinal:  Positive for abdominal distention, constipation, diarrhea and vomiting (Thick vomiting of muccus/ bile also).  Negative for abdominal pain, anal bleeding, blood in stool, nausea and rectal pain.   Genitourinary: Negative.  Negative for difficulty urinating.   Neurological:  Positive for numbness (Hands). Negative for dizziness, seizures, weakness and headaches.   Hematological: Negative.  Does not bruise/bleed easily.   Psychiatric/Behavioral: Negative.  Negative for confusion and sleep disturbance. The patient is not nervous/anxious.      PHYSICAL EXAMINATION: Vital signs reviewed per the nursing documentation.     /81 (Site: Right Upper Arm, Position: Sitting)   Ht 5' 4\" (1.626 m)   Wt 200 lb 3.2 oz (90.8 kg)   PF 88 L/min   BMI 34.36 kg/m²   Body mass index is 34.36 kg/m².   Physical Exam    Physical Exam   Constitutional: Patient is oriented to person, place, and time. Patient appears well-developed and well-nourished.   HENT:   Head: Normocephalic and atraumatic.   Eyes: Pupils are equal, round, and reactive to light. EOM are normal.   Neck: Normal range of motion. Neck supple. No JVD present. No tracheal deviation present. No thyromegaly present.   Cardiovascular: Normal rate, regular rhythm, normal heart sounds and intact distal pulses.   Pulmonary/Chest: Effort normal and breath sounds normal. No stridor. No respiratory distress. He has no wheezes. He has no rales. He exhibits no tenderness.   Abdominal: Soft. Bowel sounds are normal. He exhibits no distension and no mass. There is no tenderness. There is no rebound and no guarding. No hernia.   Musculoskeletal: Normal range of motion.   Lymphadenopathy:    Patient has no cervical adenopathy.   Neurological: Patient is alert and oriented to person, place, and time.   Psychiatric: Patient has a normal mood and affect. Patient behavior is normal.       LABORATORY DATA: Reviewed  Lab Results   Component Value Date    WBC 7.9 01/02/2023    HGB 13.7 01/02/2023    HCT 39.9 01/02/2023    MCV 85.1 01/02/2023     01/02/2023     01/02/2023    K 3.7 01/02/2023    CL 98 01/02/2023    CO2 25 01/02/2023    BUN 20  01/02/2023    CREATININE 0.94 (H) 01/02/2023    LABALBU 4.0 01/02/2023    BILITOT 0.4 01/02/2023    ALKPHOS 70 01/02/2023    AST 17 01/02/2023    ALT 18 01/02/2023         Lab Results   Component Value Date    RBC 4.69 01/02/2023    HGB 13.7 01/02/2023    MCV 85.1 01/02/2023    MCH 29.3 01/02/2023    MCHC 34.4 01/02/2023    RDW 13.3 01/02/2023    MPV 8.4 01/02/2023    BASOPCT 1 01/02/2023    LYMPHSABS 2.70 01/02/2023    MONOSABS 0.50 01/02/2023    NEUTROABS 4.60 01/02/2023    EOSABS 0.10 01/02/2023    BASOSABS 0.00 01/02/2023         DIAGNOSTIC TESTING:     No results found. IMPRESSION: Jay Crump is a 50 y.o. female with a past history remarkable for hypertension, migraine, obesity, uterine leiomyoma, hysterectomy, referred for evaluation of intermittent bilious vomiting. The patient is unable to identify any obvious dietary triggers. No recent upper endoscopy or colonoscopy. Assessment  1. Dyspepsia        Charlee was seen today for new patient. Diagnoses and all orders for this visit:    Dyspepsia-we will place patient on Prilosec 20 mg in the evening, patient take Prilosec 40 mg in a.m. We will plan for diagnostic upper endoscopy and a check for celiac disease. The risk, benefits, alternative discussed with patient. Will provide Zofran and Mylicon for bloating symptoms. Patient advised to maintain a dietary log and avoid dairy products time 72 hours. -     Celiac Disease Panel; Future  -     EGD; Future    Other orders  -     omeprazole (PRILOSEC OTC) 20 MG tablet; Take 1 tablet by mouth daily  -     ondansetron (ZOFRAN) 4 MG tablet; Take 1 tablet by mouth daily as needed for Nausea or Vomiting  -     simethicone (MYLICON) 80 MG chewable tablet; Take 1 tablet by mouth 4 times daily as needed for Flatulence       RTC: 3 months. Additional comments: Thank you for allowing me to participate in the care of Ms. Gene Boast.  For any further questions please do not hesitate to contact me.      I have reviewed and agree with the MA/LPN ROS please refer to their documentation from today's encounter on a separate note. Lazarus Sayers MD, MPH   Board Certified in Gastroenterology  Board Certified in [address] [place] [address] #: [phone]          this note is created with the assistance of a speech recognition program.  While intending to generate a document that actually reflects the content of the visit, the document can still have some errors including those of syntax and sound a like substitutions which may escape proof reading. It such instances, actual meaning can be extrapolated by contextual diversion.

## 2023-03-12 LAB
GLIADIN IGA SER IA-ACNC: 0.5 U/ML
GLIADIN IGG SER IA-ACNC: <0.4 U/ML
IGA SERPL-MCNC: 160 MG/DL (ref 70–400)
TTG IGA SER IA-ACNC: 0.2 U/ML

## 2023-03-14 NOTE — DISCHARGE INSTRUCTIONS
Normal changes you may experience after a EGD:  Activity   You have had anesthesia today  Do not drive, operate heavy equipment, consume alcoholic beverages, or make any important decisions  for 24 hours   Take your time changing positions today. You may feel light headed or dizzy if you move too quickly. Rest for the next 24 hours. Diet   You can eat your normal diet when you feel well. You should start off with bland foods like chicken soup, toast, or yogurt. Then advance as tolerated. Drink plenty of fluids (unless your doctor tells you not to). Your urine should be very lightly colored without a strong odor. Medicines   Continue your home medications as ordered by your physician.      Call your doctor now or seek immediate medical care if:[phone]  You are passing blood rectally or vomiting blood (color of blood may be red or black)  You have coffee ground looking vomit  Severe abdominal pain or tenderness    You have a fever, chills or excessive sweating   You have persistent nausea or vomiting   Redness or swelling at the IV site

## 2023-03-20 ENCOUNTER — ANESTHESIA EVENT (OUTPATIENT)
Dept: OPERATING ROOM | Age: 48
End: 2023-03-20
Payer: COMMERCIAL

## 2023-03-21 ENCOUNTER — ANESTHESIA (OUTPATIENT)
Dept: OPERATING ROOM | Age: 48
End: 2023-03-21
Payer: COMMERCIAL

## 2023-03-21 ENCOUNTER — HOSPITAL ENCOUNTER (OUTPATIENT)
Age: 48
Setting detail: OUTPATIENT SURGERY
Discharge: HOME | End: 2023-03-21
Attending: INTERNAL MEDICINE | Admitting: INTERNAL MEDICINE
Payer: COMMERCIAL

## 2023-03-21 VITALS
RESPIRATION RATE: 18 BRPM | DIASTOLIC BLOOD PRESSURE: 59 MMHG | SYSTOLIC BLOOD PRESSURE: 126 MMHG | OXYGEN SATURATION: 98 % | HEIGHT: 64 IN | BODY MASS INDEX: 33.46 KG/M2 | WEIGHT: 196 LBS | HEART RATE: 73 BPM | TEMPERATURE: 97.2 F

## 2023-03-21 DIAGNOSIS — R10.13 DYSPEPSIA: ICD-10-CM

## 2023-03-21 PROBLEM — K22.9 IRREGULAR Z LINE OF ESOPHAGUS: Status: ACTIVE | Noted: 2023-03-21

## 2023-03-21 PROBLEM — K44.9 HIATAL HERNIA: Status: ACTIVE | Noted: 2023-03-21

## 2023-03-21 PROBLEM — K29.70 GASTRITIS WITHOUT BLEEDING: Status: ACTIVE | Noted: 2023-03-21

## 2023-03-21 PROCEDURE — 6360000002 HC RX W HCPCS: Performed by: ANESTHESIOLOGY

## 2023-03-21 PROCEDURE — 2500000003 HC RX 250 WO HCPCS: Performed by: ANESTHESIOLOGY

## 2023-03-21 PROCEDURE — 2709999900 HC NON-CHARGEABLE SUPPLY: Performed by: INTERNAL MEDICINE

## 2023-03-21 PROCEDURE — 7100000011 HC PHASE II RECOVERY - ADDTL 15 MIN: Performed by: INTERNAL MEDICINE

## 2023-03-21 PROCEDURE — 3700000000 HC ANESTHESIA ATTENDED CARE: Performed by: INTERNAL MEDICINE

## 2023-03-21 PROCEDURE — 2580000003 HC RX 258: Performed by: ANESTHESIOLOGY

## 2023-03-21 PROCEDURE — 7100000010 HC PHASE II RECOVERY - FIRST 15 MIN: Performed by: INTERNAL MEDICINE

## 2023-03-21 PROCEDURE — 88305 TISSUE EXAM BY PATHOLOGIST: CPT

## 2023-03-21 PROCEDURE — 3609012400 HC EGD TRANSORAL BIOPSY SINGLE/MULTIPLE: Performed by: INTERNAL MEDICINE

## 2023-03-21 PROCEDURE — 43239 EGD BIOPSY SINGLE/MULTIPLE: CPT | Performed by: INTERNAL MEDICINE

## 2023-03-21 RX ORDER — METOCLOPRAMIDE HYDROCHLORIDE 5 MG/ML
10 INJECTION INTRAMUSCULAR; INTRAVENOUS
Status: CANCELLED | OUTPATIENT
Start: 2023-03-21 | End: 2023-03-22

## 2023-03-21 RX ORDER — SODIUM CHLORIDE 0.9 % (FLUSH) 0.9 %
5-40 SYRINGE (ML) INJECTION EVERY 12 HOURS SCHEDULED
Status: CANCELLED | OUTPATIENT
Start: 2023-03-21

## 2023-03-21 RX ORDER — SODIUM CHLORIDE 0.9 % (FLUSH) 0.9 %
5-40 SYRINGE (ML) INJECTION EVERY 12 HOURS SCHEDULED
Status: DISCONTINUED | OUTPATIENT
Start: 2023-03-21 | End: 2023-03-21 | Stop reason: HOSPADM

## 2023-03-21 RX ORDER — LIDOCAINE HYDROCHLORIDE 10 MG/ML
INJECTION, SOLUTION INFILTRATION; PERINEURAL PRN
Status: DISCONTINUED | OUTPATIENT
Start: 2023-03-21 | End: 2023-03-21 | Stop reason: SDUPTHER

## 2023-03-21 RX ORDER — LIDOCAINE HYDROCHLORIDE 10 MG/ML
1 INJECTION, SOLUTION INFILTRATION; PERINEURAL
Status: DISCONTINUED | OUTPATIENT
Start: 2023-03-21 | End: 2023-03-21 | Stop reason: HOSPADM

## 2023-03-21 RX ORDER — PROPOFOL 10 MG/ML
INJECTION, EMULSION INTRAVENOUS PRN
Status: DISCONTINUED | OUTPATIENT
Start: 2023-03-21 | End: 2023-03-21 | Stop reason: SDUPTHER

## 2023-03-21 RX ORDER — SODIUM CHLORIDE 0.9 % (FLUSH) 0.9 %
5-40 SYRINGE (ML) INJECTION PRN
Status: DISCONTINUED | OUTPATIENT
Start: 2023-03-21 | End: 2023-03-21 | Stop reason: HOSPADM

## 2023-03-21 RX ORDER — MORPHINE SULFATE 2 MG/ML
1 INJECTION, SOLUTION INTRAMUSCULAR; INTRAVENOUS EVERY 5 MIN PRN
Status: CANCELLED | OUTPATIENT
Start: 2023-03-21

## 2023-03-21 RX ORDER — SODIUM CHLORIDE 0.9 % (FLUSH) 0.9 %
5-40 SYRINGE (ML) INJECTION PRN
Status: CANCELLED | OUTPATIENT
Start: 2023-03-21

## 2023-03-21 RX ORDER — SODIUM CHLORIDE 9 MG/ML
INJECTION, SOLUTION INTRAVENOUS PRN
Status: DISCONTINUED | OUTPATIENT
Start: 2023-03-21 | End: 2023-03-21 | Stop reason: HOSPADM

## 2023-03-21 RX ORDER — SODIUM CHLORIDE, SODIUM LACTATE, POTASSIUM CHLORIDE, CALCIUM CHLORIDE 600; 310; 30; 20 MG/100ML; MG/100ML; MG/100ML; MG/100ML
INJECTION, SOLUTION INTRAVENOUS CONTINUOUS PRN
Status: DISCONTINUED | OUTPATIENT
Start: 2023-03-21 | End: 2023-03-21 | Stop reason: SDUPTHER

## 2023-03-21 RX ORDER — HYDRALAZINE HYDROCHLORIDE 20 MG/ML
10 INJECTION INTRAMUSCULAR; INTRAVENOUS
Status: CANCELLED | OUTPATIENT
Start: 2023-03-21

## 2023-03-21 RX ORDER — OXYCODONE HYDROCHLORIDE 5 MG/1
10 TABLET ORAL PRN
Status: CANCELLED | OUTPATIENT
Start: 2023-03-21 | End: 2023-03-21

## 2023-03-21 RX ORDER — SODIUM CHLORIDE 9 MG/ML
25 INJECTION, SOLUTION INTRAVENOUS PRN
Status: CANCELLED | OUTPATIENT
Start: 2023-03-21

## 2023-03-21 RX ORDER — MEPERIDINE HYDROCHLORIDE 50 MG/ML
12.5 INJECTION INTRAMUSCULAR; INTRAVENOUS; SUBCUTANEOUS ONCE
Status: CANCELLED | OUTPATIENT
Start: 2023-03-21 | End: 2023-03-21

## 2023-03-21 RX ORDER — DIPHENHYDRAMINE HYDROCHLORIDE 50 MG/ML
12.5 INJECTION INTRAMUSCULAR; INTRAVENOUS
Status: CANCELLED | OUTPATIENT
Start: 2023-03-21 | End: 2023-03-22

## 2023-03-21 RX ORDER — ONDANSETRON 2 MG/ML
4 INJECTION INTRAMUSCULAR; INTRAVENOUS
Status: CANCELLED | OUTPATIENT
Start: 2023-03-21 | End: 2023-03-22

## 2023-03-21 RX ORDER — OXYCODONE HYDROCHLORIDE 5 MG/1
5 TABLET ORAL PRN
Status: CANCELLED | OUTPATIENT
Start: 2023-03-21 | End: 2023-03-21

## 2023-03-21 RX ORDER — SODIUM CHLORIDE, SODIUM LACTATE, POTASSIUM CHLORIDE, CALCIUM CHLORIDE 600; 310; 30; 20 MG/100ML; MG/100ML; MG/100ML; MG/100ML
INJECTION, SOLUTION INTRAVENOUS CONTINUOUS
Status: DISCONTINUED | OUTPATIENT
Start: 2023-03-21 | End: 2023-03-21 | Stop reason: HOSPADM

## 2023-03-21 RX ADMIN — PROPOFOL 40 MG: 10 INJECTION, EMULSION INTRAVENOUS at 09:11

## 2023-03-21 RX ADMIN — PROPOFOL 30 MG: 10 INJECTION, EMULSION INTRAVENOUS at 09:12

## 2023-03-21 RX ADMIN — SODIUM CHLORIDE, POTASSIUM CHLORIDE, SODIUM LACTATE AND CALCIUM CHLORIDE: 600; 310; 30; 20 INJECTION, SOLUTION INTRAVENOUS at 08:35

## 2023-03-21 RX ADMIN — PROPOFOL 50 MG: 10 INJECTION, EMULSION INTRAVENOUS at 09:09

## 2023-03-21 RX ADMIN — PROPOFOL 150 MG: 10 INJECTION, EMULSION INTRAVENOUS at 09:06

## 2023-03-21 RX ADMIN — LIDOCAINE HYDROCHLORIDE 40 MG: 10 INJECTION, SOLUTION INFILTRATION; PERINEURAL at 09:06

## 2023-03-21 RX ADMIN — SODIUM CHLORIDE, POTASSIUM CHLORIDE, SODIUM LACTATE AND CALCIUM CHLORIDE: 600; 310; 30; 20 INJECTION, SOLUTION INTRAVENOUS at 09:02

## 2023-03-21 ASSESSMENT — PAIN - FUNCTIONAL ASSESSMENT: PAIN_FUNCTIONAL_ASSESSMENT: 0-10

## 2023-03-21 NOTE — OP NOTE
procedure  and lend to a higher morbidity and/or mortality risk. All material risks, benefits, and reasonable alternatives including postponing the procedure were discussed. The patient DOES wish to proceed with the procedure at this time.      Electronically signed by Ezra Blackmon MD on 3/21/2023 at 9:18 AM

## 2023-03-21 NOTE — ANESTHESIA PRE PROCEDURE
injection 5-40 mL  5-40 mL IntraVENous 2 times per day Renetta Hdz MD        sodium chloride flush 0.9 % injection 5-40 mL  5-40 mL IntraVENous PRN Renetta Hdz MD        0.9 % sodium chloride infusion   IntraVENous PRN Renetta Hdz MD           Allergies:  No Known Allergies    Problem List:    Patient Active Problem List   Diagnosis Code    Acne L70.9    Cold sore B00.1    Dermatofibroma D23.9    Dyslipidemia E78.5    Endometriosis N80.9    Fatigue R53.83    Hirsutism L68.0    IUD mechanical complication I51.15CW    Menorrhagia with irregular cycle N92.1    Menstrual migraine G43.829    Migraine headache G43.909    Mood swing R45.86    Ovarian cyst N83.209    Paronychia [id number]    Premenstrual dysphoric disorder F32.81    Cutaneous skin tags L91.8    Uterine fibroid D25.9    Vaginitis N76.0    Vulvitis N76.2    Obesity E66.9    Essential hypertension I10    Tension headache G44.209    Neuralgia M79.2    Arthralgia of left temporomandibular joint M26.622    Chronic facial pain R51.9, G89.29    Chest pain R07.9    Dense breast tissue on mammogram R92.2    S/P NIRMALA (total abdominal hysterectomy) Z90.710    Cervical radiculopathy M54.12    Cervical spondylosis with myelopathy M47.12    Adjustment disorder with mixed anxiety and depressed mood F43.23    Open comedone L70.0    Occipital neuralgia of left side M54.81    Vision loss of left eye H54.62    Numbness and tingling in both hands R20.0, R20.2    Vasovagal episode R55       Past Medical History:        Diagnosis Date    Headache 2016    Herpes labialis     Hyperlipidemia     Hypertension     Migraine     Uterine leiomyoma        Past Surgical History:        Procedure Laterality Date     SECTION      HYSTERECTOMY (CERVIX STATUS UNKNOWN)  2018    With Removal of Submucous Leiomyomata    LAPAROSCOPY      Excis Endometriotic Tissue Uterosacral Ligaments       Social History:    Social

## 2023-03-21 NOTE — H&P
High Cholesterol Mother     Mental Illness Mother     Other Father         Alcoholism, COPD    Alcohol Abuse Father     Other Sister         Ovarian Neoplasm    Cancer Maternal Grandmother         breast    Breast Cancer Maternal Grandmother     Heart Disease Maternal Grandfather         Massive heart attack early death 42s    Cancer Paternal Grandmother         breast    Stroke Paternal Grandmother     Arthritis Paternal Grandmother     Breast Cancer Paternal Grandmother     Cancer Paternal Grandfather         lung      No family history of colon cancer, Crohn's disease, or ulcerative colitis    Problems with Sedation/Anesthesia in the past? no    REVIEW OF SYSTEMS:  12 point review of systems negative other than mentioned above. PHYSICAL EXAM:    Vitals: There were no vitals taken for this visit. Focused Exam related to procedure:    General appearance: NAD, conversant   Eyes: anicteric sclerae, moist conjunctivae; no lid-lag; PERRLA   Lungs: CTA, with normal respiratory effort and no intercostal retractions   CV: RRR, no MRGs   Abdomen: Soft, non-tender; no masses or HSM   Skin: Normal temperature, turgor and texture; no rash, ulcers or subcutaneous nodules     DATA:  CBC:   Lab Results   Component Value Date    WBC 7.9 01/02/2023    HGB 13.7 01/02/2023    HCT 39.9 01/02/2023    MCV 85.1 01/02/2023     01/02/2023     BUN/Cr:   Lab Results   Component Value Date    BUN 20 01/02/2023   ,   Lab Results   Component Value Date    CREATININE 0.94 (H) 01/02/2023     Potassium:   Lab Results   Component Value Date    K 3.7 01/02/2023     PT/INR: No results found for: INR, PROTIME    ASSESSMENT AND PLAN:       1. Patient is a 50 y.o. female with above specified procedure planned. Expected Sedation/Anesthesia Type: MAC    2. ASA ([address] Anesthesiology) Anesthesia Status: Class 2 - A normal healthy patient with mild systemic disease    3. Mallampati: II (soft palate, uvula, fauces visible)  4.

## 2023-03-21 NOTE — ANESTHESIA POSTPROCEDURE EVALUATION
POST- ANESTHESIA EVALUATION       Pt Name: Rivera Keyes  MRN: [id number]  [place]: 1975  Date of evaluation: 3/21/2023  Time:  10:22 AM      BP (!) 126/59   Pulse 73   Temp 97.2 °F (36.2 °C) (Skin)   Resp 18   Ht 5' 4\" (1.626 m)   Wt 196 lb (88.9 kg)   SpO2 98%   BMI 33.64 kg/m²      Consciousness Level  Awake  Cardiopulmonary Status  Stable  Pain Adequately Treated YES  Nausea / Vomiting  NO  Adequate Hydration  YES  Anesthesia Related Complications NONE      Electronically signed by Annamarie Noriega MD on 3/21/2023 at 10:22 AM       Department of Anesthesiology  Postprocedure Note    Patient: Rivera Keyes  MRN: [id number]  [place]: 1975  Date of evaluation: 3/21/2023      Procedure Summary     Date: 03/21/23 Room / Location: [place] [address] / [facility]    Anesthesia Start: 0902 Anesthesia Stop: 0552    Procedure: EGD BIOPSY Diagnosis:       Dyspepsia      (Dyspepsia [R10.13])    Surgeons: Lisy Clarke MD Responsible Provider: Denys Becerril MD    Anesthesia Type: MAC ASA Status: 2          Anesthesia Type: No value filed.     Benita Phase I: Benita Score: 10    Benita Phase II: Benita Score: 10      Anesthesia Post Evaluation

## 2023-03-22 LAB — SURGICAL PATHOLOGY REPORT: NORMAL

## 2023-04-27 RX ORDER — LISINOPRIL AND HYDROCHLOROTHIAZIDE 20; 12.5 MG/1; MG/1
TABLET ORAL
Qty: 90 TABLET | Refills: 0 | Status: SHIPPED | OUTPATIENT
Start: 2023-04-27

## 2023-04-27 NOTE — TELEPHONE ENCOUNTER
Aynana Ledezma. [facility] wont fill 90 at a time. They did 30 x 3 months which is fine but can we just set up the qty 90 to auto refill through [facility]? I have about a weeks worth left. Thanks!       LAST VISIT:   2023     Future Appointments   Date Time Provider Yanick Lozano   2023  3:40 PM ROSEMARY Mar - CNP Neuro Spec Neurology -   2023  2:30 PM Clarice Spencer MD [place] [address]

## 2023-05-01 RX ORDER — LISINOPRIL AND HYDROCHLOROTHIAZIDE 20; 12.5 MG/1; MG/1
TABLET ORAL
Qty: 90 TABLET | Refills: 0 | OUTPATIENT
Start: 2023-05-01

## 2023-05-03 RX ORDER — LISINOPRIL AND HYDROCHLOROTHIAZIDE 20; 12.5 MG/1; MG/1
TABLET ORAL
Qty: 90 TABLET | Refills: 1 | Status: SHIPPED | OUTPATIENT
Start: 2023-05-03

## 2023-07-12 ENCOUNTER — OFFICE VISIT (OUTPATIENT)
Dept: GASTROENTEROLOGY | Age: 48
End: 2023-07-12
Payer: COMMERCIAL

## 2023-07-12 VITALS
HEIGHT: 64 IN | DIASTOLIC BLOOD PRESSURE: 85 MMHG | WEIGHT: 202.6 LBS | OXYGEN SATURATION: 97 % | BODY MASS INDEX: 34.59 KG/M2 | SYSTOLIC BLOOD PRESSURE: 138 MMHG

## 2023-07-12 DIAGNOSIS — K21.9 GASTROESOPHAGEAL REFLUX DISEASE WITHOUT ESOPHAGITIS: Primary | ICD-10-CM

## 2023-07-12 DIAGNOSIS — K22.70 BARRETT'S ESOPHAGUS WITHOUT DYSPLASIA: ICD-10-CM

## 2023-07-12 PROCEDURE — 99213 OFFICE O/P EST LOW 20 MIN: CPT | Performed by: INTERNAL MEDICINE

## 2023-07-12 PROCEDURE — 3079F DIAST BP 80-89 MM HG: CPT | Performed by: INTERNAL MEDICINE

## 2023-07-12 PROCEDURE — 3075F SYST BP GE 130 - 139MM HG: CPT | Performed by: INTERNAL MEDICINE

## 2023-07-12 RX ORDER — OMEPRAZOLE 20 MG/1
20 TABLET, DELAYED RELEASE ORAL EVERY EVENING
Qty: 30 TABLET | Refills: 3 | Status: SHIPPED | OUTPATIENT
Start: 2023-07-12

## 2023-07-12 RX ORDER — ONDANSETRON 4 MG/1
4 TABLET, FILM COATED ORAL DAILY PRN
Qty: 30 TABLET | Refills: 0 | Status: SHIPPED | OUTPATIENT
Start: 2023-07-12

## 2023-07-12 ASSESSMENT — ENCOUNTER SYMPTOMS
WHEEZING: 0
DIARRHEA: 0
RESPIRATORY NEGATIVE: 1
VOMITING: 0
BACK PAIN: 0
SHORTNESS OF BREATH: 0
ABDOMINAL PAIN: 1
EYE REDNESS: 0
CONSTIPATION: 0
NAUSEA: 0
EYE PAIN: 0
SORE THROAT: 1
COUGH: 0

## 2023-07-12 NOTE — PROGRESS NOTES
Take 1 tablet by mouth 4 times daily as needed for Flatulence  -     promethazine (PHENERGAN) 12.5 MG tablet; Take 1 tablet by mouth 4 times daily as needed for Nausea  -     ondansetron (ZOFRAN) 4 MG tablet; Take 1 tablet by mouth daily as needed for Nausea or Vomiting          RTC: 3 months. Additional comments: Thank you for allowing me to participate in the care of Ms. Hector Simmons. For any further questions please do not hesitate to contact me. I have reviewed and agree with the ROS entered by the MA/LPN from today's encounter documented in a separate note. Delfin Multani MD, MPH   Board Certified in Gastroenterology  Board Certified in [address] [place] [address] #: [phone]    this note is created with the assistance of a speech recognition program.  While intending to generate a document that actually reflects the content of the visit, the document can still have some errors including those of syntax and sound a like substitutions which may escape proof reading. It such instances, actual meaning can be extrapolated by contextual diversion.

## 2023-07-31 NOTE — TELEPHONE ENCOUNTER
LAST VISIT:   1/19/2023     Future Appointments   Date Time Provider [address]   8/23/2023  1:40 PM Rasta Deleon MD Neuro Spec Neurology -   10/12/2023  3:00 PM Gely Ballesteros  [address]

## 2023-08-01 RX ORDER — LISINOPRIL AND HYDROCHLOROTHIAZIDE 20; 12.5 MG/1; MG/1
TABLET ORAL
Qty: 90 TABLET | Refills: 1 | Status: SHIPPED | OUTPATIENT
Start: 2023-08-01

## 2023-09-02 DIAGNOSIS — G43.829 MENSTRUAL MIGRAINE WITHOUT STATUS MIGRAINOSUS, NOT INTRACTABLE: ICD-10-CM

## 2023-09-05 NOTE — TELEPHONE ENCOUNTER
LAST VISIT:   1/19/2023     Future Appointments   Date Time Provider [address]   10/12/2023  3:00 PM Trung Schulz MD [facility]   10/17/2023  8:40 AM Jeannine Young MD Neuro [facility] Neurology -

## 2023-09-06 RX ORDER — NARATRIPTAN 2.5 MG/1
TABLET ORAL
Qty: 9 TABLET | Refills: 0 | Status: SHIPPED | OUTPATIENT
Start: 2023-09-06

## 2023-09-19 ENCOUNTER — OFFICE VISIT (OUTPATIENT)
Dept: PRIMARY CARE CLINIC | Age: 48
End: 2023-09-19
Payer: COMMERCIAL

## 2023-09-19 VITALS
BODY MASS INDEX: 34.69 KG/M2 | HEART RATE: 92 BPM | SYSTOLIC BLOOD PRESSURE: 122 MMHG | DIASTOLIC BLOOD PRESSURE: 82 MMHG | OXYGEN SATURATION: 97 % | HEIGHT: 64 IN | WEIGHT: 203.2 LBS

## 2023-09-19 DIAGNOSIS — F43.23 ADJUSTMENT DISORDER WITH MIXED ANXIETY AND DEPRESSED MOOD: ICD-10-CM

## 2023-09-19 DIAGNOSIS — R73.01 IMPAIRED FASTING GLUCOSE: ICD-10-CM

## 2023-09-19 DIAGNOSIS — E66.9 OBESITY (BMI 30-39.9): ICD-10-CM

## 2023-09-19 DIAGNOSIS — Z12.39 BREAST SCREENING: ICD-10-CM

## 2023-09-19 DIAGNOSIS — K22.9 IRREGULAR Z LINE OF ESOPHAGUS: ICD-10-CM

## 2023-09-19 DIAGNOSIS — R06.83 SNORING: ICD-10-CM

## 2023-09-19 DIAGNOSIS — G43.009 MIGRAINE WITHOUT AURA AND WITHOUT STATUS MIGRAINOSUS, NOT INTRACTABLE: ICD-10-CM

## 2023-09-19 DIAGNOSIS — I10 ESSENTIAL HYPERTENSION: ICD-10-CM

## 2023-09-19 DIAGNOSIS — R53.82 CHRONIC FATIGUE: ICD-10-CM

## 2023-09-19 DIAGNOSIS — Z00.00 HEALTH CARE MAINTENANCE: ICD-10-CM

## 2023-09-19 DIAGNOSIS — R53.82 CHRONIC FATIGUE: Primary | ICD-10-CM

## 2023-09-19 PROBLEM — R63.5 ABNORMAL WEIGHT GAIN: Status: ACTIVE | Noted: 2023-02-01

## 2023-09-19 PROBLEM — D64.9 ANEMIA: Status: ACTIVE | Noted: 2023-02-01

## 2023-09-19 PROBLEM — R10.2 PAIN IN FEMALE PELVIS: Status: ACTIVE | Noted: 2023-02-01

## 2023-09-19 LAB
ALBUMIN SERPL-MCNC: 4.3 G/DL (ref 3.5–5.2)
ALBUMIN/GLOBULIN RATIO: 1.7 (ref 1–2.5)
ALP BLD-CCNC: 69 U/L (ref 35–104)
ALT SERPL-CCNC: 17 U/L (ref 5–33)
ANION GAP SERPL CALCULATED.3IONS-SCNC: 13 MMOL/L (ref 9–17)
AST SERPL-CCNC: 16 U/L
BILIRUB SERPL-MCNC: 0.3 MG/DL (ref 0.3–1.2)
BUN BLDV-MCNC: 14 MG/DL (ref 6–20)
CALCIUM SERPL-MCNC: 8.9 MG/DL (ref 8.6–10.4)
CHLORIDE BLD-SCNC: 101 MMOL/L (ref 98–107)
CHOLESTEROL/HDL RATIO: 5.6
CHOLESTEROL: 268 MG/DL
CO2: 23 MMOL/L (ref 20–31)
CREAT SERPL-MCNC: 0.6 MG/DL (ref 0.5–0.9)
GFR SERPL CREATININE-BSD FRML MDRD: >60 ML/MIN/1.73M2
GLUCOSE FASTING: 86 MG/DL (ref 70–99)
HDLC SERPL-MCNC: 48 MG/DL
LDL CHOLESTEROL: 188 MG/DL (ref 0–130)
MAGNESIUM: 2 MG/DL (ref 1.6–2.6)
POTASSIUM SERPL-SCNC: 4.3 MMOL/L (ref 3.7–5.3)
SODIUM BLD-SCNC: 137 MMOL/L (ref 135–144)
T4 FREE: 1.2 NG/DL (ref 0.9–1.7)
TOTAL PROTEIN: 6.8 G/DL (ref 6.4–8.3)
TRIGL SERPL-MCNC: 158 MG/DL
TSH SERPL DL<=0.05 MIU/L-ACNC: 3.16 UIU/ML (ref 0.3–5)

## 2023-09-19 PROCEDURE — 3079F DIAST BP 80-89 MM HG: CPT | Performed by: PHYSICIAN ASSISTANT

## 2023-09-19 PROCEDURE — 3074F SYST BP LT 130 MM HG: CPT | Performed by: PHYSICIAN ASSISTANT

## 2023-09-19 PROCEDURE — 99396 PREV VISIT EST AGE 40-64: CPT | Performed by: PHYSICIAN ASSISTANT

## 2023-09-19 RX ORDER — OMEPRAZOLE 20 MG/1
CAPSULE, DELAYED RELEASE ORAL
COMMUNITY
Start: 2023-09-05 | End: 2023-09-19 | Stop reason: CLARIF

## 2023-09-19 RX ORDER — BUPROPION HYDROCHLORIDE 100 MG/1
100 TABLET, EXTENDED RELEASE ORAL 2 TIMES DAILY
Qty: 60 TABLET | Refills: 3 | Status: SHIPPED | OUTPATIENT
Start: 2023-09-19

## 2023-09-19 RX ORDER — ONDANSETRON 4 MG/1
TABLET, ORALLY DISINTEGRATING ORAL
COMMUNITY
Start: 2023-09-04 | End: 2023-09-19 | Stop reason: CLARIF

## 2023-09-19 RX ORDER — TOPIRAMATE 25 MG/1
25 TABLET ORAL NIGHTLY
Qty: 60 TABLET | Refills: 3 | Status: SHIPPED | OUTPATIENT
Start: 2023-09-19

## 2023-09-19 SDOH — ECONOMIC STABILITY: FOOD INSECURITY: WITHIN THE PAST 12 MONTHS, YOU WORRIED THAT YOUR FOOD WOULD RUN OUT BEFORE YOU GOT MONEY TO BUY MORE.: NEVER TRUE

## 2023-09-19 SDOH — ECONOMIC STABILITY: HOUSING INSECURITY
IN THE LAST 12 MONTHS, WAS THERE A TIME WHEN YOU DID NOT HAVE A STEADY PLACE TO SLEEP OR SLEPT IN A SHELTER (INCLUDING NOW)?: NO

## 2023-09-19 SDOH — ECONOMIC STABILITY: INCOME INSECURITY: HOW HARD IS IT FOR YOU TO PAY FOR THE VERY BASICS LIKE FOOD, HOUSING, MEDICAL CARE, AND HEATING?: NOT HARD AT ALL

## 2023-09-19 SDOH — ECONOMIC STABILITY: FOOD INSECURITY: WITHIN THE PAST 12 MONTHS, THE FOOD YOU BOUGHT JUST DIDN'T LAST AND YOU DIDN'T HAVE MONEY TO GET MORE.: NEVER TRUE

## 2023-09-19 ASSESSMENT — ENCOUNTER SYMPTOMS
DIARRHEA: 0
ABDOMINAL PAIN: 0
COUGH: 0
NAUSEA: 0
SORE THROAT: 0
VOMITING: 0
EYE PAIN: 0
EYE DISCHARGE: 0
CONSTIPATION: 0
WHEEZING: 0
RHINORRHEA: 0
EYE REDNESS: 0
BLOOD IN STOOL: 0
SHORTNESS OF BREATH: 0
CHEST TIGHTNESS: 0

## 2023-09-19 NOTE — PROGRESS NOTES
[facility]  [facility] [place] [address]  Phone: [phone]  Fax: [phone]    Jillian Newton is a 50 y.o. female who presents today for her medical conditions/complaintsas noted below. Jillian Newton is c/o of   Chief Complaint   Patient presents with    Annual Exam     Pt is here for a physical. Pt would like her annual BW    Other     Pt we requesting sleep study due to snoring at night     Weight Management     Pt would like to discuss weight loss options     Colonoscopy     Pt states she got a colonoscopy at [facility] x2 years ago 02/21/22   . HPI:   Due for mammogram.  Would like routine blood work. Recently diagnosed with Cazares's esophagitis, hiatal hernia, and gastritis. Is currently taking 60 mg daily for the esophagitis and hiatal hernia. States that she feels very puffy from taking. History of hypertension. Monitors at home on occasionally when she states she is not feeling well. She states this happens a couple of times a month. States that she takes care of her dad and has low energy. States that she has used medications in the past for mood. She states her mood is down sometimes and there is extra stressors when she misses a day taking care of him. She is interested in possibly taking the Buspar as needed as she did in the past. Denies SI/HI. Would like to discuss weight loss options. Was recommended by GI to lose weight. States that she has been trying to watch what she eats but is too tired to work out. States that she sleeps well but  states she snores very loud but states she is still exhausted each day. History of migraines. Last migraine was last week. Follows with a neurologist.  Endorsing leakage of urine with coughing and sneezing. Will get flu vaccination at work.   Past Medical History:   Diagnosis Date    Headache 2016    Herpes labialis     Hyperlipidemia     Hypertension     Migraine     Uterine leiomyoma

## 2023-09-20 DIAGNOSIS — E78.5 DYSLIPIDEMIA: Primary | ICD-10-CM

## 2023-09-20 LAB
ESTIMATED AVERAGE GLUCOSE: 105 MG/DL
HBA1C MFR BLD: 5.3 % (ref 4–6)
VITAMIN B-12: 560 PG/ML (ref 232–1245)

## 2023-09-25 RX ORDER — ATORVASTATIN CALCIUM 20 MG/1
20 TABLET, FILM COATED ORAL DAILY
Qty: 90 TABLET | Refills: 0 | Status: SHIPPED | OUTPATIENT
Start: 2023-09-25

## 2023-09-25 NOTE — TELEPHONE ENCOUNTER
LAST VISIT:   9/19/2023     Future Appointments   Date Time Provider [address]   9/29/2023  9:15 AM [address] MAMMO  [facility] [address] [facility] Radiolog   10/12/2023  3:00 PM Holly Ocampo MD [facility]   10/17/2023  8:40 AM Leisa Myers MD Neuro Spec Neurology -   10/19/2023  8:00 AM Andreina Vargas PA-C [facility] in [address] [place] [address]

## 2023-09-29 ENCOUNTER — HOSPITAL ENCOUNTER (OUTPATIENT)
Dept: WOMENS IMAGING | Age: 48
Discharge: HOME | End: 2023-09-29
Payer: COMMERCIAL

## 2023-09-29 VITALS — WEIGHT: 202 LBS | HEIGHT: 64 IN | BODY MASS INDEX: 34.49 KG/M2

## 2023-09-29 DIAGNOSIS — Z12.39 BREAST SCREENING: ICD-10-CM

## 2023-09-29 PROCEDURE — 77063 BREAST TOMOSYNTHESIS BI: CPT

## 2023-10-06 ENCOUNTER — PATIENT MESSAGE (OUTPATIENT)
Dept: PRIMARY CARE CLINIC | Age: 48
End: 2023-10-06

## 2023-10-06 DIAGNOSIS — N30.00 ACUTE CYSTITIS WITHOUT HEMATURIA: ICD-10-CM

## 2023-10-06 DIAGNOSIS — N30.00 ACUTE CYSTITIS WITHOUT HEMATURIA: Primary | ICD-10-CM

## 2023-10-07 LAB
CULTURE: NO GROWTH
SPECIMEN DESCRIPTION: NORMAL

## 2023-10-17 ENCOUNTER — OFFICE VISIT (OUTPATIENT)
Dept: NEUROLOGY | Age: 48
End: 2023-10-17
Payer: COMMERCIAL

## 2023-10-17 VITALS
SYSTOLIC BLOOD PRESSURE: 129 MMHG | HEIGHT: 64 IN | BODY MASS INDEX: 34.08 KG/M2 | DIASTOLIC BLOOD PRESSURE: 83 MMHG | HEART RATE: 88 BPM | WEIGHT: 199.6 LBS

## 2023-10-17 DIAGNOSIS — G43.829 MENSTRUAL MIGRAINE WITHOUT STATUS MIGRAINOSUS, NOT INTRACTABLE: ICD-10-CM

## 2023-10-17 DIAGNOSIS — H54.62 VISION LOSS OF LEFT EYE: Primary | ICD-10-CM

## 2023-10-17 DIAGNOSIS — G43.009 MIGRAINE WITHOUT AURA AND WITHOUT STATUS MIGRAINOSUS, NOT INTRACTABLE: ICD-10-CM

## 2023-10-17 PROCEDURE — 3079F DIAST BP 80-89 MM HG: CPT | Performed by: STUDENT IN AN ORGANIZED HEALTH CARE EDUCATION/TRAINING PROGRAM

## 2023-10-17 PROCEDURE — 99215 OFFICE O/P EST HI 40 MIN: CPT | Performed by: STUDENT IN AN ORGANIZED HEALTH CARE EDUCATION/TRAINING PROGRAM

## 2023-10-17 PROCEDURE — 3074F SYST BP LT 130 MM HG: CPT | Performed by: STUDENT IN AN ORGANIZED HEALTH CARE EDUCATION/TRAINING PROGRAM

## 2023-10-17 RX ORDER — NARATRIPTAN 2.5 MG/1
TABLET ORAL
Qty: 9 TABLET | Refills: 0 | Status: SHIPPED | OUTPATIENT
Start: 2023-10-17

## 2023-10-17 RX ORDER — PREDNISONE 20 MG/1
TABLET ORAL
Qty: 18 TABLET | Refills: 0 | Status: SHIPPED | OUTPATIENT
Start: 2023-10-17

## 2023-10-17 RX ORDER — FREMANEZUMAB-VFRM 225 MG/1.5ML
225 INJECTION SUBCUTANEOUS
Qty: 1 EACH | Refills: 3 | Status: SHIPPED | OUTPATIENT
Start: 2023-10-17

## 2023-10-17 RX ORDER — OMEPRAZOLE 20 MG/1
CAPSULE, DELAYED RELEASE ORAL
COMMUNITY
Start: 2023-10-06

## 2023-10-17 RX ORDER — FREMANEZUMAB-VFRM 225 MG/1.5ML
225 INJECTION SUBCUTANEOUS
Qty: 1 EACH | Refills: 3 | Status: SHIPPED | OUTPATIENT
Start: 2023-10-17 | End: 2023-10-17

## 2023-10-17 NOTE — PROGRESS NOTES
REVIEW OF SYSTEMS        All items selected indicate a positive finding. Those items not selected are negative. Constitutional [] Weight loss/gain   [] Fatigue  [] Fever/Chills   HEENT [] Hearing Loss  [] Visual Disturbance  [] Tinnitus  [] Eye pain   Respiratory [] Shortness of Breath  [] Cough  [] Snoring   Cardiovascular [] Chest Pain  [] Palpitations  [x] Lightheaded   GI [] Constipation  [] Diarrhea  [] Swallowing change  [] Nausea/vomiting    [] Urinary Frequency  [] Urinary Urgency   Musculoskeletal [] Neck pain  [] Back pain  [] Muscle pain  [] Restless legs   Dermatologic [] Skin changes   Neurologic [] Memory loss/confusion  [] Seizures  [] Trouble walking or imbalance  [] Dizziness  [] Sleep disturbance  [] Weakness  [x] Numbness  [] Tremors  [] Speech Difficulty  [x] Headaches  [] Light Sensitivity  [] Sound Sensitivity   Endocrinology []Excessive thirst  []Excessive hunger   Psychiatric [] Anxiety/Depression  [] Hallucination   Allergy/immunology []Hives/environmental allergies   Hematologic/lymph [] Abnormal bleeding  [] Abnormal bruising         PHYSICAL EXAMINATION:       There were no vitals filed for this visit.                                              .                                                                                                    General Appearance:  Alert, cooperative, no signs of distress, appears stated age   Head:  Normocephalic, no signs of trauma   Eyes:  Conjunctiva/corneas clear;  eyelids intact   Ears:  Normal external ear and canals   Nose: Nares normal, mucosa normal, no drainage    Throat: Lips and tongue normal; teeth normal;  gums normal   Neck: Supple, intact flexion, extension and rotation;   trachea midline;  no adenopathy;   thyroid: not enlarged;   no carotid pulse abnormality   Back:   Symmetric, no curvature, ROM adequate   Lungs:   Respirations unlabored   Heart:  Regular rate and rhythm           Extremities: Extremities normal, no

## 2023-11-15 ENCOUNTER — HOSPITAL ENCOUNTER (OUTPATIENT)
Dept: SLEEP CENTER | Age: 48
Discharge: HOME | End: 2023-11-17
Payer: COMMERCIAL

## 2023-11-15 DIAGNOSIS — R06.83 SNORING: ICD-10-CM

## 2023-11-15 DIAGNOSIS — R53.82 CHRONIC FATIGUE: ICD-10-CM

## 2023-11-15 PROCEDURE — 95810 POLYSOM 6/> YRS 4/> PARAM: CPT

## 2023-11-16 VITALS
WEIGHT: 199 LBS | OXYGEN SATURATION: 95 % | HEART RATE: 73 BPM | RESPIRATION RATE: 12 BRPM | BODY MASS INDEX: 33.97 KG/M2 | HEIGHT: 64 IN

## 2023-11-20 ENCOUNTER — OFFICE VISIT (OUTPATIENT)
Dept: PRIMARY CARE CLINIC | Age: 48
End: 2023-11-20
Payer: COMMERCIAL

## 2023-11-20 VITALS
SYSTOLIC BLOOD PRESSURE: 118 MMHG | OXYGEN SATURATION: 96 % | DIASTOLIC BLOOD PRESSURE: 74 MMHG | WEIGHT: 197 LBS | HEIGHT: 64 IN | HEART RATE: 98 BPM | BODY MASS INDEX: 33.63 KG/M2

## 2023-11-20 DIAGNOSIS — I49.3 SYMPTOMATIC PVCS: Primary | ICD-10-CM

## 2023-11-20 PROCEDURE — 3078F DIAST BP <80 MM HG: CPT | Performed by: STUDENT IN AN ORGANIZED HEALTH CARE EDUCATION/TRAINING PROGRAM

## 2023-11-20 PROCEDURE — 99213 OFFICE O/P EST LOW 20 MIN: CPT | Performed by: STUDENT IN AN ORGANIZED HEALTH CARE EDUCATION/TRAINING PROGRAM

## 2023-11-20 PROCEDURE — 3074F SYST BP LT 130 MM HG: CPT | Performed by: STUDENT IN AN ORGANIZED HEALTH CARE EDUCATION/TRAINING PROGRAM

## 2023-11-20 PROCEDURE — 93000 ELECTROCARDIOGRAM COMPLETE: CPT | Performed by: STUDENT IN AN ORGANIZED HEALTH CARE EDUCATION/TRAINING PROGRAM

## 2023-11-20 ASSESSMENT — ENCOUNTER SYMPTOMS
SHORTNESS OF BREATH: 0
CHEST TIGHTNESS: 1

## 2023-11-23 LAB — STATUS: NORMAL

## 2023-11-24 DIAGNOSIS — G47.33 OSA (OBSTRUCTIVE SLEEP APNEA): Primary | ICD-10-CM

## 2023-11-30 ENCOUNTER — OFFICE VISIT (OUTPATIENT)
Dept: GASTROENTEROLOGY | Age: 48
End: 2023-11-30
Payer: COMMERCIAL

## 2023-11-30 VITALS
DIASTOLIC BLOOD PRESSURE: 92 MMHG | SYSTOLIC BLOOD PRESSURE: 139 MMHG | HEIGHT: 64 IN | BODY MASS INDEX: 33.31 KG/M2 | OXYGEN SATURATION: 95 % | WEIGHT: 195.13 LBS | HEART RATE: 100 BPM

## 2023-11-30 DIAGNOSIS — R10.13 DYSPEPSIA: Primary | ICD-10-CM

## 2023-11-30 PROCEDURE — 3080F DIAST BP >= 90 MM HG: CPT | Performed by: INTERNAL MEDICINE

## 2023-11-30 PROCEDURE — 99213 OFFICE O/P EST LOW 20 MIN: CPT | Performed by: INTERNAL MEDICINE

## 2023-11-30 PROCEDURE — 3075F SYST BP GE 130 - 139MM HG: CPT | Performed by: INTERNAL MEDICINE

## 2023-11-30 ASSESSMENT — ENCOUNTER SYMPTOMS
ABDOMINAL DISTENTION: 1
EYE PAIN: 0
SHORTNESS OF BREATH: 0
SORE THROAT: 0
CONSTIPATION: 0
COUGH: 0
NAUSEA: 0
RESPIRATORY NEGATIVE: 1
VOMITING: 0
BACK PAIN: 0
DIARRHEA: 0
WHEEZING: 0
EYE REDNESS: 0
ABDOMINAL PAIN: 1

## 2023-11-30 NOTE — PROGRESS NOTES
GI FOLLOW UP    INTERVAL HISTORY:     Status post upper endoscopy  Patient does have a mild nonspecific symptoms  Biopsy of the GE junction revealed evidence of a nondysplastic short segment Cazares's    Chief Complaint   Patient presents with    Follow-up    Gastroesophageal Reflux       1. Dyspepsia              HISTORY OF PRESENT ILLNESS: Willa Spivey is a 50 y.o. female with a past history remarkable for hypertension, migraine, obesity, uterine leiomyoma, hysterectomy, referred for evaluation of intermittent bilious vomiting. The patient is unable to identify any obvious dietary triggers. Smoker: None   Drinking history: None   Illicit drugs:  None   Abdominal surgeries: Hysterectomy, Lap IUD removal, adhesions. Prior Colonoscopy: None   Prior EGD: None   FH of GI issues: None     Past Medical,Family, and Social History reviewed and does contribute to the patient presenting condition. Patient's PMH/PSH,SH,PSYCH Hx, MEDs, ALLERGIES, and ROS were all reviewed and updated in the appropriate sections.     PAST MEDICAL HISTORY:  Past Medical History:   Diagnosis Date    Headache 2016    Herpes labialis     Hyperlipidemia     Hypertension     Migraine     Uterine leiomyoma        Past Surgical History:   Procedure Laterality Date     SECTION      ESOPHAGOGASTRODUODENOSCOPY  2023    EGD BIOPSY    HYSTERECTOMY (CERVIX STATUS UNKNOWN)  2018    With Removal of Submucous Leiomyomata    LAPAROSCOPY      Excis Endometriotic Tissue Uterosacral Ligaments    UPPER GASTROINTESTINAL ENDOSCOPY N/A 3/21/2023    EGD BIOPSY performed by Carlos A Adan MD at [facility] OR       CURRENT MEDICATIONS:    Current Outpatient Medications:     omeprazole (PRILOSEC) 20 MG delayed release capsule, , Disp: , Rfl:     naratriptan (AMERGE) 2.5 MG tablet, TAKE 1 TABLET BY MOUTH AT ONSET OF MIGRAINE

## 2023-12-04 ENCOUNTER — HOSPITAL ENCOUNTER (OUTPATIENT)
Dept: SLEEP CENTER | Age: 48
Discharge: HOME | End: 2023-12-06
Payer: COMMERCIAL

## 2023-12-04 DIAGNOSIS — G47.33 OSA (OBSTRUCTIVE SLEEP APNEA): ICD-10-CM

## 2023-12-04 PROCEDURE — 95811 POLYSOM 6/>YRS CPAP 4/> PARM: CPT

## 2023-12-05 VITALS
WEIGHT: 199 LBS | HEART RATE: 70 BPM | HEIGHT: 64 IN | OXYGEN SATURATION: 94 % | BODY MASS INDEX: 33.97 KG/M2 | RESPIRATION RATE: 16 BRPM

## 2023-12-07 PROBLEM — G47.33 OSA (OBSTRUCTIVE SLEEP APNEA): Status: ACTIVE | Noted: 2023-12-07

## 2023-12-07 LAB — STATUS: NORMAL

## 2024-01-10 RX ORDER — LISINOPRIL AND HYDROCHLOROTHIAZIDE 20; 12.5 MG/1; MG/1
TABLET ORAL
Qty: 90 TABLET | Refills: 3 | Status: SHIPPED | OUTPATIENT
Start: 2024-01-10

## 2024-01-17 ENCOUNTER — TELEPHONE (OUTPATIENT)
Dept: PRIMARY CARE CLINIC | Age: 49
End: 2024-01-17

## 2024-01-19 ENCOUNTER — OFFICE VISIT (OUTPATIENT)
Dept: PRIMARY CARE CLINIC | Age: 49
End: 2024-01-19
Payer: COMMERCIAL

## 2024-01-19 VITALS
HEART RATE: 87 BPM | SYSTOLIC BLOOD PRESSURE: 114 MMHG | DIASTOLIC BLOOD PRESSURE: 80 MMHG | OXYGEN SATURATION: 97 % | BODY MASS INDEX: 33.29 KG/M2 | WEIGHT: 195 LBS | HEIGHT: 64 IN

## 2024-01-19 DIAGNOSIS — M79.604 PAIN IN BOTH LOWER EXTREMITIES: ICD-10-CM

## 2024-01-19 DIAGNOSIS — M79.641 BILATERAL HAND PAIN: ICD-10-CM

## 2024-01-19 DIAGNOSIS — M79.641 BILATERAL HAND PAIN: Primary | ICD-10-CM

## 2024-01-19 DIAGNOSIS — M79.605 PAIN IN BOTH LOWER EXTREMITIES: ICD-10-CM

## 2024-01-19 DIAGNOSIS — M79.642 BILATERAL HAND PAIN: ICD-10-CM

## 2024-01-19 DIAGNOSIS — M25.562 ACUTE PAIN OF LEFT KNEE: ICD-10-CM

## 2024-01-19 DIAGNOSIS — M79.642 BILATERAL HAND PAIN: Primary | ICD-10-CM

## 2024-01-19 LAB
ABSOLUTE IMMATURE GRANULOCYTE: 0.03 K/UL (ref 0–0.3)
BASOPHILS ABSOLUTE: 0.05 K/UL (ref 0–0.2)
BASOPHILS RELATIVE PERCENT: 1 % (ref 0–2)
EOSINOPHILS ABSOLUTE: 0.11 K/UL (ref 0–0.44)
EOSINOPHILS RELATIVE PERCENT: 2 % (ref 1–4)
HCT VFR BLD CALC: 43.5 % (ref 36.3–47.1)
HEMOGLOBIN: 14.5 G/DL (ref 11.9–15.1)
IMMATURE GRANULOCYTES: 0 %
LYMPHOCYTES ABSOLUTE: 2.42 K/UL (ref 1.1–3.7)
LYMPHOCYTES RELATIVE PERCENT: 34 % (ref 24–43)
MCH RBC QN AUTO: 28.9 PG (ref 25.2–33.5)
MCHC RBC AUTO-ENTMCNC: 33.3 G/DL (ref 28.4–34.8)
MCV RBC AUTO: 86.7 FL (ref 82.6–102.9)
MONOCYTES ABSOLUTE: 0.51 K/UL (ref 0.1–1.2)
MONOCYTES RELATIVE PERCENT: 7 % (ref 3–12)
NEUTROPHILS ABSOLUTE: 3.97 K/UL (ref 1.5–8.1)
NEUTROPHILS RELATIVE PERCENT: 56 % (ref 36–65)
NRBC AUTOMATED: 0 PER 100 WBC
PDW BLD-RTO: 12.2 % (ref 11.8–14.4)
PLATELET # BLD: 251 K/UL (ref 138–453)
PMV BLD AUTO: 9.8 FL (ref 8.1–13.5)
RBC # BLD: 5.02 M/UL (ref 3.95–5.11)
WBC # BLD: 7.1 K/UL (ref 3.5–11.3)

## 2024-01-19 PROCEDURE — 3079F DIAST BP 80-89 MM HG: CPT | Performed by: PHYSICIAN ASSISTANT

## 2024-01-19 PROCEDURE — 99214 OFFICE O/P EST MOD 30 MIN: CPT | Performed by: PHYSICIAN ASSISTANT

## 2024-01-19 PROCEDURE — 3074F SYST BP LT 130 MM HG: CPT | Performed by: PHYSICIAN ASSISTANT

## 2024-01-19 ASSESSMENT — PATIENT HEALTH QUESTIONNAIRE - PHQ9
9. THOUGHTS THAT YOU WOULD BE BETTER OFF DEAD, OR OF HURTING YOURSELF: 0
SUM OF ALL RESPONSES TO PHQ QUESTIONS 1-9: 0
7. TROUBLE CONCENTRATING ON THINGS, SUCH AS READING THE NEWSPAPER OR WATCHING TELEVISION: 0
8. MOVING OR SPEAKING SO SLOWLY THAT OTHER PEOPLE COULD HAVE NOTICED. OR THE OPPOSITE, BEING SO FIGETY OR RESTLESS THAT YOU HAVE BEEN MOVING AROUND A LOT MORE THAN USUAL: 0
10. IF YOU CHECKED OFF ANY PROBLEMS, HOW DIFFICULT HAVE THESE PROBLEMS MADE IT FOR YOU TO DO YOUR WORK, TAKE CARE OF THINGS AT HOME, OR GET ALONG WITH OTHER PEOPLE: 0
SUM OF ALL RESPONSES TO PHQ QUESTIONS 1-9: 0
4. FEELING TIRED OR HAVING LITTLE ENERGY: 0
3. TROUBLE FALLING OR STAYING ASLEEP: 0
2. FEELING DOWN, DEPRESSED OR HOPELESS: 0
SUM OF ALL RESPONSES TO PHQ9 QUESTIONS 1 & 2: 0
SUM OF ALL RESPONSES TO PHQ QUESTIONS 1-9: 0
SUM OF ALL RESPONSES TO PHQ QUESTIONS 1-9: 0
6. FEELING BAD ABOUT YOURSELF - OR THAT YOU ARE A FAILURE OR HAVE LET YOURSELF OR YOUR FAMILY DOWN: 0
5. POOR APPETITE OR OVEREATING: 0
1. LITTLE INTEREST OR PLEASURE IN DOING THINGS: 0

## 2024-01-19 ASSESSMENT — ENCOUNTER SYMPTOMS
BACK PAIN: 0
RESPIRATORY NEGATIVE: 1
ABDOMINAL PAIN: 0

## 2024-01-19 NOTE — PROGRESS NOTES
[facility]  [address]  [facility] [address]  Phone: [phone]  Fax: [phone]    Charlee Hernandez is a 48 y.o. female who presents today for her medical conditions/complaintsas noted below.  Chief Complaint   Patient presents with    Leg Pain     Pt is due to leg leg pain -- pt noticed x3 weeks ago and rates the pain a 3  states achy     Hand Pain     Pt states hand feel stiff and swollen and is worse in the mornings , pt states this started over a week ago         HPI:     Leg pain started in the left leg about 3 weeks ago in specific places: medial calf, lateral knee and lateral thigh.  Both legs ache. No injury or recent travel.  Leg is not swelling, no heat or redness. No rash. Only new change is she started using a CPAP    Did not start with her exercise or starting her statin which she just did 2 weeks ago    Tried capsacian, heat, ice, Aleve, Tylenol. Had massage yesterday    Hands feel stiff now for about 1 week. They have also been swollen. Usually only her ankles swell.       Current Outpatient Medications   Medication Sig Dispense Refill    lisinopril-hydroCHLOROthiazide (PRINZIDE;ZESTORETIC) 20-12.5 MG per tablet TAKE 1 TABLET DAILY 90 tablet 3    omeprazole (PRILOSEC) 20 MG delayed release capsule       naratriptan (AMERGE) 2.5 MG tablet TAKE 1 TABLET BY MOUTH AT ONSET OF MIGRAINE ONCE AS NEEDED AND MAY REPEAT IN 4 HOURS IF NEEDED 9 tablet 0    Fremanezumab-vfrm (AJOVY) 225 MG/1.5ML SOSY Inject 225 mg into the skin every 30 days 1 each 3    atorvastatin (LIPITOR) 20 MG tablet Take 1 tablet by mouth daily 90 tablet 0    ondansetron (ZOFRAN) 4 MG tablet Take 1 tablet by mouth daily as needed for Nausea or Vomiting 30 tablet 0    simethicone (MYLICON) 80 MG chewable tablet Take 1 tablet by mouth 4 times daily as needed for Flatulence 180 tablet 3    Multiple Vitamin (MULTI-DAY) TABS Take 1 tablet by mouth daily       No current facility-administered

## 2024-01-20 LAB
ANION GAP SERPL CALCULATED.3IONS-SCNC: 13 MMOL/L (ref 9–17)
BUN BLDV-MCNC: 19 MG/DL (ref 6–20)
C-REACTIVE PROTEIN: <3 MG/L (ref 0–5)
CALCIUM SERPL-MCNC: 9.2 MG/DL (ref 8.6–10.4)
CHLORIDE BLD-SCNC: 106 MMOL/L (ref 98–107)
CO2: 23 MMOL/L (ref 20–31)
CREAT SERPL-MCNC: 0.7 MG/DL (ref 0.5–0.9)
GFR SERPL CREATININE-BSD FRML MDRD: >60 ML/MIN/1.73M2
GLUCOSE BLD-MCNC: 96 MG/DL (ref 70–99)
MAGNESIUM: 2 MG/DL (ref 1.6–2.6)
POTASSIUM SERPL-SCNC: 4.6 MMOL/L (ref 3.7–5.3)
SEDIMENTATION RATE, ERYTHROCYTE: 21 MM/HR (ref 0–20)
SODIUM BLD-SCNC: 142 MMOL/L (ref 135–144)
TOTAL CK: 64 U/L (ref 26–192)

## 2024-02-19 RX ORDER — FREMANEZUMAB-VFRM 225 MG/1.5ML
225 INJECTION SUBCUTANEOUS
Qty: 1 EACH | Refills: 5 | Status: SHIPPED | OUTPATIENT
Start: 2024-02-19

## 2024-02-19 NOTE — TELEPHONE ENCOUNTER
Pharmacy requesting refill of Ajovy 225 mg.      Medication active on med list yes      Date of last Rx: 10/17/2023 with 3 refills          verified by VIC TRAN      Date of last appointment 10/17/2023    Next Visit Date:  2/21/2024

## 2024-02-21 ENCOUNTER — OFFICE VISIT (OUTPATIENT)
Dept: NEUROLOGY | Age: 49
End: 2024-02-21
Payer: COMMERCIAL

## 2024-02-21 VITALS
HEIGHT: 64 IN | BODY MASS INDEX: 32.95 KG/M2 | HEART RATE: 94 BPM | DIASTOLIC BLOOD PRESSURE: 81 MMHG | WEIGHT: 193 LBS | SYSTOLIC BLOOD PRESSURE: 120 MMHG

## 2024-02-21 DIAGNOSIS — G43.829 MENSTRUAL MIGRAINE WITHOUT STATUS MIGRAINOSUS, NOT INTRACTABLE: ICD-10-CM

## 2024-02-21 PROCEDURE — 3079F DIAST BP 80-89 MM HG: CPT | Performed by: STUDENT IN AN ORGANIZED HEALTH CARE EDUCATION/TRAINING PROGRAM

## 2024-02-21 PROCEDURE — 99214 OFFICE O/P EST MOD 30 MIN: CPT | Performed by: STUDENT IN AN ORGANIZED HEALTH CARE EDUCATION/TRAINING PROGRAM

## 2024-02-21 PROCEDURE — 3074F SYST BP LT 130 MM HG: CPT | Performed by: STUDENT IN AN ORGANIZED HEALTH CARE EDUCATION/TRAINING PROGRAM

## 2024-02-21 RX ORDER — NARATRIPTAN 2.5 MG/1
TABLET ORAL
Qty: 9 TABLET | Refills: 3 | Status: SHIPPED | OUTPATIENT
Start: 2024-02-21

## 2024-02-21 NOTE — PROGRESS NOTES
[facility] [place]            [address], [address]          [place], [place] [address]          Dept: [phone]          Dept Fax: [phone]             2/21/2024      HISTORY OF PRESENT ILLNESS:       I had the pleasure of seeing Charlee Hernandez, who returns for treatment of a left occipital neuralgia and paresthesias of the bilateral upper extremities.    For migraine prophylaxis, she is taking Ajovy every 30 days. She notes her headaches have significantly improved. She notes she has 1/10 pain in bitemporal area that occurs about 3 times a month. She is prescribed naratriptan for abortive therapy.     She notes she had a severe side efect to atorvastin where she had muscle cramps in different parts of her body for two weeks. This resolved after stopping the statin.     She was recently diagnosed with obstructive sleep apnea and started PAP therapy. She notes benefit with usage. She is following with a pulmonologist for management..    Prior History:  Patient notes she is having headaches that are located bitemporal area rated 2/10 in severity. It can be 10/10 when it is severe. She notes the pain is constantly there and started about 7-8 weeks ago. Headache is associated with left eye blurriness and pain. Eye pain started about 5 days ago. She notes she had an episode similar to this in the past.  She can't take NSAIDs due to her Cazares esophagus. She was recently started on Topamax but had difficulty tolerating it. She is taking naratriptan for abortive therapy which is effective.      Prior headache medications:  Currently using Naratriptan  Previously been on Topamax and elavil      Testing reviewed:    MRI Cervical Spine W WO Contrast 11/30/2022  Impression   1. Multilevel degenerative change with moderate spinal canal narrowing at   C5-6 and mild spinal canal narrowing at C6-7.   2. Multilevel neural foraminal narrowing as above.   3. Straightening of the normal

## 2024-03-05 ENCOUNTER — TELEPHONE (OUTPATIENT)
Dept: GASTROENTEROLOGY | Age: 49
End: 2024-03-05

## 2024-03-05 ENCOUNTER — HOSPITAL ENCOUNTER (OUTPATIENT)
Dept: GENERAL RADIOLOGY | Age: 49
Discharge: HOME | End: 2024-03-07
Payer: COMMERCIAL

## 2024-03-05 ENCOUNTER — HOSPITAL ENCOUNTER (OUTPATIENT)
Age: 49
Discharge: HOME | End: 2024-03-07
Payer: COMMERCIAL

## 2024-03-05 ENCOUNTER — OFFICE VISIT (OUTPATIENT)
Dept: GASTROENTEROLOGY | Age: 49
End: 2024-03-05
Payer: COMMERCIAL

## 2024-03-05 VITALS
SYSTOLIC BLOOD PRESSURE: 122 MMHG | TEMPERATURE: 98 F | WEIGHT: 194.2 LBS | DIASTOLIC BLOOD PRESSURE: 78 MMHG | HEART RATE: 78 BPM | OXYGEN SATURATION: 97 % | HEIGHT: 64 IN | BODY MASS INDEX: 33.16 KG/M2

## 2024-03-05 DIAGNOSIS — R10.13 DYSPEPSIA: ICD-10-CM

## 2024-03-05 DIAGNOSIS — K22.70 BARRETT'S ESOPHAGUS WITHOUT DYSPLASIA: Primary | ICD-10-CM

## 2024-03-05 DIAGNOSIS — K59.00 CONSTIPATION, UNSPECIFIED CONSTIPATION TYPE: ICD-10-CM

## 2024-03-05 DIAGNOSIS — R14.0 BLOATING: ICD-10-CM

## 2024-03-05 PROCEDURE — 3074F SYST BP LT 130 MM HG: CPT | Performed by: INTERNAL MEDICINE

## 2024-03-05 PROCEDURE — 3078F DIAST BP <80 MM HG: CPT | Performed by: INTERNAL MEDICINE

## 2024-03-05 PROCEDURE — 99214 OFFICE O/P EST MOD 30 MIN: CPT | Performed by: INTERNAL MEDICINE

## 2024-03-05 PROCEDURE — 74019 RADEX ABDOMEN 2 VIEWS: CPT

## 2024-03-05 RX ORDER — POLYETHYLENE GLYCOL 3350 17 G/17G
17 POWDER, FOR SOLUTION ORAL DAILY
Qty: 1530 G | Refills: 1 | Status: SHIPPED | OUTPATIENT
Start: 2024-03-05 | End: 2024-04-04

## 2024-03-05 ASSESSMENT — ENCOUNTER SYMPTOMS
TROUBLE SWALLOWING: 0
CONSTIPATION: 0
ABDOMINAL PAIN: 0
ANAL BLEEDING: 0
NAUSEA: 1

## 2024-03-05 NOTE — TELEPHONE ENCOUNTER
Writer left pt voicemail message.     8:45 am opening has already been booked.  ALIYAH said pt can be scheduled at 8:15 am instead.    Writer called pt and left voicemail message stating pt's procedure would be at 8:15 am instead of 8:45 am. Pt will need to arrive by 6:15 am instead of 6:45 am.    Writer left in voicemail to call office back to confirm receipt of message.

## 2024-03-05 NOTE — TELEPHONE ENCOUNTER
Procedure scheduled    EGD/Aziz  Dx: Cazares's esophagus without dysplasia; Dyspepsia  Thursday 04/25/24 at 8:45 am/Arrive at 6:45 am  [facility]; Surgery Entrance, back of hospital    PAT Phone Call: Friday 04/19/24 at 9:30 am    EGD Prep given to pt in office. Pt instructed in office. Pt states she does not take blood thinners.

## 2024-03-05 NOTE — PROGRESS NOTES
Reason for Referral:   Follow-up    No referring provider defined for this encounter.    Chief Complaint   Patient presents with    Follow-up     Dyspepsia           HISTORY OF PRESENT ILLNESS: Ms.Hollie Hernandez is a 49 y.o. female with a past history remarkable for hyperlipidemia, hypertension, sleep apnea, short segment Cazares's esophagus, who presents for follow-up.  The patient reports continued symptoms of bloating and nausea.  She does take Zofran for her nausea and feels that this makes her more constipated.  She does report having straining with her bowel movements.  She did have 2 episodes of some rectal bleeding however they were self-limited.  Her last colonoscopy was 2 years ago at [facility] and that was completely normal.  She has a bowel movement every day but she does not think she goes enough.  She does not take any laxatives.  She is taking omeprazole once daily.      Previous Endoscopies    EGD 3/2023:  1) Irregular z-line, 35-37 cm, suspected Cazares's vs SIM vs hiatal hernia. Biopsy taken.  2) Mild non-specific erythema, no ulcerations, no erosions. Cold biopsy taken  3) Normal appearing duodenal mucosa     A.  STOMACH, BIOPSY:   - MILD CHRONIC GASTRITIS.   - NEGATIVE FOR HELICOBACTER PYLORI INFECTION.     B.  GASTROESOPHAGEAL JUNCTION, BIOPSY:   - BENIGN PROXIMAL GASTRIC MUCOSA WITH FOCAL INTESTINAL (GOBLET CELL)    METAPLASIA AND MODERATE CHRONIC INFLAMMATION.   - BENIGN SQUAMOUS MUCOSA.   - NEGATIVE FOR DYSPLASIA AND MALIGNANCY.     No formal report but colonoscopy 2 years ago at [facility] that was reportedly normal    Previous GI workup       Past Medical,Family, and Social History reviewed and does not contribute to the patient presentingcondition.    Patient's PMH/PSH,SH,PSYCH Hx, MEDs, ALLERGIES, and ROS were all reviewed and updated in the appropriate sections.    PAST MEDICAL HISTORY:  Past Medical History:   Diagnosis Date    Headache 2016    Herpes labialis

## 2024-04-19 ENCOUNTER — HOSPITAL ENCOUNTER (OUTPATIENT)
Dept: PREADMISSION TESTING | Age: 49
Discharge: HOME | End: 2024-04-23

## 2024-04-19 VITALS — WEIGHT: 190 LBS | HEIGHT: 64 IN | BODY MASS INDEX: 32.44 KG/M2

## 2024-04-19 NOTE — PROGRESS NOTES
Pre-op Instructions For Out-Patient Endoscopy Surgery    Medication Instructions:  Please stop herbs and any supplements now (includes vitamins and minerals).    Please contact your surgeon and prescribing physician for pre-op instructions for any blood thinners.    If you have inhalers/aerosol treatments at home, please use them the morning of your surgery and bring the inhalers with you to the hospital.    Please take the following medications the morning of your surgery with a sip of water:    None    Surgery Instructions:  After midnight before surgery:  Do not eat or drink anything, including water, mints, gum, and hard candy.  You may brush your teeth without swallowing.  No smoking, chewing tobacco, or street drugs.    Please shower or bathe before surgery.       Please do not wear any cologne, lotion, powder, jewelry, piercings, perfume, makeup, nail polish, hair accessories, or hair spray on the day of surgery.  Wear loose comfortable clothing.    Leave your valuables at home but bring a payment source for any after-surgery prescriptions you plan to fill at [facility].  Bring a storage case for any glasses/contacts.    An adult who is responsible for you MUST drive you home and should be with you for the first 24 hours after surgery.     The Day of Surgery:  Arrive at [facility] Surgery Entrance at the time directed by your surgeon and check in at the desk.     If you have a living will or healthcare power of , please bring a copy.    You will be taken to the pre-op holding area where you will be prepared for surgery.  A physical assessment will be performed by a nurse practitioner or house officer.  Your IV will be started and you will meet your anesthesiologist.    When you go to surgery, your family will be directed to the surgical waiting room, where the doctor should speak with them after your surgery.    After surgery, you will be taken to the recovery area.  When you

## 2024-04-23 NOTE — PRE-PROCEDURE INSTRUCTIONS
Have you received your Prep? Any questions with prep instructions? NA  Only Clear Liquid Diet day before.NA  Nothing to eat after midnight day before procedure.Y  Are you taking any blood thinners? If so, you need to Stop.N  Remove any jewelry and body piercings.Y  Do you wear glasses? If so, please bring a case to store them in.  Are you having any Covid symptoms?N  Do you have any new rashes, infections, etc. that we should be aware of?N  Do you have a ride home the day of surgery? It cannot be a cab or medical transportation.Y  Verify surgery time/date and what time to arrive at hospital. [id number]

## 2024-04-24 ENCOUNTER — ANESTHESIA EVENT (OUTPATIENT)
Dept: OPERATING ROOM | Age: 49
End: 2024-04-24
Payer: COMMERCIAL

## 2024-04-25 ENCOUNTER — ANESTHESIA (OUTPATIENT)
Dept: OPERATING ROOM | Age: 49
End: 2024-04-25
Payer: COMMERCIAL

## 2024-04-25 ENCOUNTER — HOSPITAL ENCOUNTER (OUTPATIENT)
Age: 49
Setting detail: OUTPATIENT SURGERY
Discharge: HOME | End: 2024-04-25
Attending: INTERNAL MEDICINE | Admitting: INTERNAL MEDICINE
Payer: COMMERCIAL

## 2024-04-25 VITALS
RESPIRATION RATE: 16 BRPM | DIASTOLIC BLOOD PRESSURE: 64 MMHG | HEIGHT: 64 IN | TEMPERATURE: 97.3 F | HEART RATE: 67 BPM | SYSTOLIC BLOOD PRESSURE: 119 MMHG | OXYGEN SATURATION: 98 % | WEIGHT: 190 LBS | BODY MASS INDEX: 32.44 KG/M2

## 2024-04-25 DIAGNOSIS — K22.70 BARRETT'S ESOPHAGUS WITHOUT DYSPLASIA: ICD-10-CM

## 2024-04-25 DIAGNOSIS — R10.13 DYSPEPSIA: ICD-10-CM

## 2024-04-25 PROCEDURE — 3609012400 HC EGD TRANSORAL BIOPSY SINGLE/MULTIPLE: Performed by: INTERNAL MEDICINE

## 2024-04-25 PROCEDURE — 3700000000 HC ANESTHESIA ATTENDED CARE: Performed by: INTERNAL MEDICINE

## 2024-04-25 PROCEDURE — 3700000001 HC ADD 15 MINUTES (ANESTHESIA): Performed by: INTERNAL MEDICINE

## 2024-04-25 PROCEDURE — 6360000002 HC RX W HCPCS: Performed by: NURSE ANESTHETIST, CERTIFIED REGISTERED

## 2024-04-25 PROCEDURE — 43239 EGD BIOPSY SINGLE/MULTIPLE: CPT | Performed by: INTERNAL MEDICINE

## 2024-04-25 PROCEDURE — 7100000010 HC PHASE II RECOVERY - FIRST 15 MIN: Performed by: INTERNAL MEDICINE

## 2024-04-25 PROCEDURE — 7100000011 HC PHASE II RECOVERY - ADDTL 15 MIN: Performed by: INTERNAL MEDICINE

## 2024-04-25 PROCEDURE — 88305 TISSUE EXAM BY PATHOLOGIST: CPT

## 2024-04-25 PROCEDURE — 2500000003 HC RX 250 WO HCPCS: Performed by: NURSE ANESTHETIST, CERTIFIED REGISTERED

## 2024-04-25 PROCEDURE — 2580000003 HC RX 258: Performed by: ANESTHESIOLOGY

## 2024-04-25 PROCEDURE — 2709999900 HC NON-CHARGEABLE SUPPLY: Performed by: INTERNAL MEDICINE

## 2024-04-25 RX ORDER — SODIUM CHLORIDE, SODIUM LACTATE, POTASSIUM CHLORIDE, CALCIUM CHLORIDE 600; 310; 30; 20 MG/100ML; MG/100ML; MG/100ML; MG/100ML
INJECTION, SOLUTION INTRAVENOUS CONTINUOUS
Status: DISCONTINUED | OUTPATIENT
Start: 2024-04-25 | End: 2024-04-25 | Stop reason: HOSPADM

## 2024-04-25 RX ORDER — OMEPRAZOLE 20 MG/1
20 CAPSULE, DELAYED RELEASE ORAL DAILY
Qty: 30 CAPSULE | Refills: 3 | Status: SHIPPED | OUTPATIENT
Start: 2024-04-25

## 2024-04-25 RX ORDER — SODIUM CHLORIDE 0.9 % (FLUSH) 0.9 %
5-40 SYRINGE (ML) INJECTION EVERY 12 HOURS SCHEDULED
Status: DISCONTINUED | OUTPATIENT
Start: 2024-04-25 | End: 2024-04-25 | Stop reason: HOSPADM

## 2024-04-25 RX ORDER — LIDOCAINE HYDROCHLORIDE 10 MG/ML
1 INJECTION, SOLUTION EPIDURAL; INFILTRATION; INTRACAUDAL; PERINEURAL
Status: DISCONTINUED | OUTPATIENT
Start: 2024-04-25 | End: 2024-04-25 | Stop reason: HOSPADM

## 2024-04-25 RX ORDER — LIDOCAINE HYDROCHLORIDE 20 MG/ML
INJECTION, SOLUTION EPIDURAL; INFILTRATION; INTRACAUDAL; PERINEURAL PRN
Status: DISCONTINUED | OUTPATIENT
Start: 2024-04-25 | End: 2024-04-25 | Stop reason: SDUPTHER

## 2024-04-25 RX ORDER — PROPOFOL 10 MG/ML
INJECTION, EMULSION INTRAVENOUS PRN
Status: DISCONTINUED | OUTPATIENT
Start: 2024-04-25 | End: 2024-04-25 | Stop reason: SDUPTHER

## 2024-04-25 RX ORDER — LORATADINE 10 MG/1
10 TABLET ORAL DAILY PRN
COMMUNITY
Start: 2024-03-30

## 2024-04-25 RX ORDER — SODIUM CHLORIDE 9 MG/ML
INJECTION, SOLUTION INTRAVENOUS PRN
Status: DISCONTINUED | OUTPATIENT
Start: 2024-04-25 | End: 2024-04-25 | Stop reason: HOSPADM

## 2024-04-25 RX ORDER — SODIUM CHLORIDE 0.9 % (FLUSH) 0.9 %
5-40 SYRINGE (ML) INJECTION PRN
Status: DISCONTINUED | OUTPATIENT
Start: 2024-04-25 | End: 2024-04-25 | Stop reason: HOSPADM

## 2024-04-25 RX ADMIN — LIDOCAINE HYDROCHLORIDE 100 MG: 20 INJECTION, SOLUTION EPIDURAL; INFILTRATION; INTRACAUDAL; PERINEURAL at 08:04

## 2024-04-25 RX ADMIN — PROPOFOL 50 MG: 10 INJECTION, EMULSION INTRAVENOUS at 08:09

## 2024-04-25 RX ADMIN — PROPOFOL 160 MG: 10 INJECTION, EMULSION INTRAVENOUS at 08:06

## 2024-04-25 RX ADMIN — SODIUM CHLORIDE, POTASSIUM CHLORIDE, SODIUM LACTATE AND CALCIUM CHLORIDE: 600; 310; 30; 20 INJECTION, SOLUTION INTRAVENOUS at 06:53

## 2024-04-25 RX ADMIN — PROPOFOL 40 MG: 10 INJECTION, EMULSION INTRAVENOUS at 08:08

## 2024-04-25 RX ADMIN — PROPOFOL 50 MG: 10 INJECTION, EMULSION INTRAVENOUS at 08:12

## 2024-04-25 ASSESSMENT — ENCOUNTER SYMPTOMS
NAUSEA: 1
RESPIRATORY NEGATIVE: 1

## 2024-04-25 ASSESSMENT — PAIN - FUNCTIONAL ASSESSMENT: PAIN_FUNCTIONAL_ASSESSMENT: 0-10

## 2024-04-25 NOTE — H&P
HISTORY and PHYSICAL  [facility]       NAME:  Charlee Hernandez  MRN: [id number]   YOB: 1975   Date: 2024   Age: 49 y.o.  Gender: female       COMPLAINT AND PRESENT HISTORY:     Charlee Hernandez is 49 y.o.,  female, presents for ESOPHAGOGASTRODUODENOSCOPY BIOPSY   Primary dx: Cazares's esophagus without dysplasia [K22.70]  Dyspepsia [R10.13].    HPI:  Charlee Hernandez is 49 y.o.,  female, undergoing for EGD.   Patient has had previous endoscopies done    Patient denies any FH of Esophogeal Cancer.   Patient complains of frequent heartburn,  Pt has hx of GERD  Cazares's Esophagus  and is taking  Prilosec with mild pain improvement.  Patient denies any Dysphagia.   Patient complains of epigastric pains.  nausea and no vomiting ,  No diarrhea / constipation, no changes in the color, caliber or consistency of the stools.   No fever or chills, chest pain or SOB     Review of additional significant medical hx:  (See chart for additional detail, including current medications /see ROS for current S/S):   HTN/HLD  Current medication r/t conditions : lisinopril-hydrochlorothiazide, Lipitor     BP Readings from Last 3 Encounters:   24 122/78   24 120/81   24 114/80       NPO status: pt Npo since the past midnight   Medications taken TODAY (with sip of water): none  Anticoagulation status: none  Denies personal hx of blood clots.  Denies personal hx of MRSA infection.  Denies any personal or family hx of previous complications w/anesthesia.    PAST MEDICAL HISTORY     Past Medical History:   Diagnosis Date    Headache 2016    Herpes labialis     Hyperlipidemia     Hypertension     Migraine     Sleep apnea     Obstructive slerp apnea    Uterine leiomyoma        SURGICAL HISTORY       Past Surgical History:   Procedure Laterality Date     SECTION      ESOPHAGOGASTRODUODENOSCOPY  2023    EGD BIOPSY    HYSTERECTOMY (CERVIX STATUS UNKNOWN)  2018    With

## 2024-04-25 NOTE — OP NOTE
EGD report    Esophagogastroduodenoscopy (EGD) Procedure Note    Procedure:  EGD with biopsies    Procedure Date: 4/25/2024    Indications: Cazares's esophagus without dysplasia    Sedation: MAC    Attending Physician:  Dr. Bryan Gonsalez MD    Assistant: None    Procedure Details:    Informed consent was obtained for the procedure, including sedation. Risks of infection, perforation, hemorrhage, adverse drug reaction, and aspiration were discussed. The patient was placed in the left lateral decubitus position. The patient was monitored continuously with ECG tracing, pulse oximetry, blood pressure monitoring, and direct observation.      The gastroscope was inserted into the mouth and advanced under direct vision to second portion of the duodenum.  A careful inspection was made as the gastroscope was withdrawn, including a retroflexed view of the proximal stomach; findings and interventions are described below. Appropriate photodocumentation was obtained.    Findings:  Retropharyngeal area was grossly normal appearing     Esophagus: A 1 to 2 cm salmon-colored mucosa noted extending above the squamocolumnar junction.  Biopsies obtained to rule out Cazares's or dysplasia.     Stomach:    Fundus: normal    Body: normal    Antrum: normal     Duodenum:     Bulb: normal    First part: Normal    Second Part: Normal  Biopsies obtained to rule out celiac disease      Complications:  None           Estimated blood loss: Minimal    Disposition: Home           Condition: Stable    Specimen Removed: Distal esophagus, duodenum    Impression:    1 to 2 cm salmon-colored mucosal tongue extending above the squamocolumnar junction, biopsies to rule out Cazares's and dysplasia obtained.  Biopsies from duodenum obtained to rule out celiac disease.    Recommendations:  Follow pathology results.  The patient reports she is having worsening symptoms of GERD, will recommend daily PPI therapy as opposed to every other day.  Avoid NSAIDs  If

## 2024-04-25 NOTE — ANESTHESIA POSTPROCEDURE EVALUATION
Department of Anesthesiology  Postprocedure Note    Patient: Charlee Hernandez  MRN: [id number]  YOB: 1975  Date of evaluation: 4/25/2024    Procedure Summary       Date: 04/25/24 Room / Location: [address]    Anesthesia Start: 0759 Anesthesia Stop: 0821    Procedure: ESOPHAGOGASTRODUODENOSCOPY BIOPSY (Esophagus) Diagnosis:       Cazares's esophagus without dysplasia      Dyspepsia      (Cazares's esophagus without dysplasia [K22.70])      (Dyspepsia [R10.13])    Surgeons: Bryan Gonsalez MD Responsible Provider: Phil Jimenez MD    Anesthesia Type: general, TIVA ASA Status: 3            Anesthesia Type: No value filed.    Benita Phase I:      Benita Phase II: Benita Score: 10    Anesthesia Post Evaluation    Patient location during evaluation: PACU  Patient participation: complete - patient participated  Level of consciousness: awake and alert  Airway patency: patent  Nausea & Vomiting: no vomiting  Cardiovascular status: hemodynamically stable  Respiratory status: acceptable  Hydration status: euvolemic  Comments: POST- ANESTHESIA EVALUATION       Pt Name: Charlee Hernandez  MRN: [id number]  YOB: 1975  Date of evaluation: 4/25/2024  Time:  10:21 AM      /64   Pulse 67   Temp 97.3 °F (36.3 °C)   Resp 16   Ht 1.626 m (5' 4\")   Wt 86.2 kg (190 lb)   SpO2 98%   BMI 32.61 kg/m²      Consciousness Level  Awake  Cardiopulmonary Status  Stable  Pain Adequately Treated YES  Nausea / Vomiting  NO  Adequate Hydration  YES  Anesthesia Related Complications NONE      Electronically signed by Phil Jimenez MD on 4/25/2024 at 10:21 AM         Pain management: satisfactory to patient    No notable events documented.

## 2024-04-25 NOTE — ANESTHESIA PRE PROCEDURE
Department of Anesthesiology  Preprocedure Note       Name:  Charlee Hernandez   Age:  49 y.o.  :  1975                                          MRN:  [id number]         Date:  2024      Surgeon: Surgeon(s):  Bryan Gonsalez MD    Procedure: Procedure(s):  ESOPHAGOGASTRODUODENOSCOPY BIOPSY    Medications prior to admission:   Prior to Admission medications    Medication Sig Start Date End Date Taking? Authorizing Provider   loratadine (CLARITIN) 10 MG tablet Take 1 tablet by mouth daily as needed (allergies) 3/30/24  Yes Chiquita Wooten MD   Tirzepatide (MOUNJARO) 2.5 MG/0.5ML SOPN SC injection Inject 0.5 mLs into the skin once a week On Sundays, patient due to increase dose and give on , patient going to hold injection until after procedure    Chiquita Wooten MD   naratriptan (AMERGE) 2.5 MG tablet TAKE 1 TABLET BY MOUTH AT ONSET OF MIGRAINE ONCE AS NEEDED AND MAY REPEAT IN 4 HOURS IF NEEDED 24   Pippa Hugo MD   Fremanezumab-vfrm (AJOVY) 225 MG/1.5ML SOSY Inject 225 mg into the skin every 30 days 24   Pippa Hugo MD   lisinopril-hydroCHLOROthiazide (PRINZIDE;ZESTORETIC) 20-12.5 MG per tablet TAKE 1 TABLET DAILY 1/10/24   Carol Eldridge PA-C   omeprazole (PRILOSEC) 20 MG delayed release capsule  10/6/23   Chiquita Wooten MD   atorvastatin (LIPITOR) 20 MG tablet Take 1 tablet by mouth daily  Patient not taking: Reported on 3/5/2024 9/25/23   Carol Eldridge PA-C   ondansetron (ZOFRAN) 4 MG tablet Take 1 tablet by mouth daily as needed for Nausea or Vomiting 23   Jovan Oneill MD   Multiple Vitamin (MULTI-DAY) TABS Take 1 tablet by mouth daily    Carol Eldridge PA-C       Current medications:    Current Facility-Administered Medications   Medication Dose Route Frequency Provider Last Rate Last Admin   • lidocaine PF 1 % injection 1 mL  1 mL IntraDERmal Once PRN Margarita Acuña MD       • sodium chloride flush 0.9 % injection 5-40 mL  5-40 mL

## 2024-04-27 LAB — SURGICAL PATHOLOGY REPORT: NORMAL

## 2024-05-22 ENCOUNTER — OFFICE VISIT (OUTPATIENT)
Dept: GASTROENTEROLOGY | Age: 49
End: 2024-05-22
Payer: COMMERCIAL

## 2024-05-22 VITALS
RESPIRATION RATE: 20 BRPM | OXYGEN SATURATION: 99 % | BODY MASS INDEX: 32.74 KG/M2 | WEIGHT: 191.8 LBS | HEIGHT: 64 IN | HEART RATE: 72 BPM | DIASTOLIC BLOOD PRESSURE: 80 MMHG | SYSTOLIC BLOOD PRESSURE: 130 MMHG

## 2024-05-22 DIAGNOSIS — K59.00 CONSTIPATION, UNSPECIFIED CONSTIPATION TYPE: ICD-10-CM

## 2024-05-22 DIAGNOSIS — R11.0 NAUSEA: ICD-10-CM

## 2024-05-22 DIAGNOSIS — K22.70 BARRETT'S ESOPHAGUS WITHOUT DYSPLASIA: Primary | ICD-10-CM

## 2024-05-22 PROCEDURE — 3079F DIAST BP 80-89 MM HG: CPT | Performed by: INTERNAL MEDICINE

## 2024-05-22 PROCEDURE — 99214 OFFICE O/P EST MOD 30 MIN: CPT | Performed by: INTERNAL MEDICINE

## 2024-05-22 PROCEDURE — G2211 COMPLEX E/M VISIT ADD ON: HCPCS | Performed by: INTERNAL MEDICINE

## 2024-05-22 PROCEDURE — 3075F SYST BP GE 130 - 139MM HG: CPT | Performed by: INTERNAL MEDICINE

## 2024-05-22 ASSESSMENT — ENCOUNTER SYMPTOMS
RECTAL PAIN: 0
VOICE CHANGE: 0
NAUSEA: 1
COLOR CHANGE: 0
CHOKING: 0
COUGH: 0
SORE THROAT: 0
CONSTIPATION: 0
WHEEZING: 0
ANAL BLEEDING: 0
VOMITING: 1
ABDOMINAL PAIN: 0
TROUBLE SWALLOWING: 0
BLOOD IN STOOL: 0
ABDOMINAL DISTENTION: 1
DIARRHEA: 0

## 2024-06-11 ENCOUNTER — PATIENT MESSAGE (OUTPATIENT)
Dept: GASTROENTEROLOGY | Age: 49
End: 2024-06-11

## 2024-06-11 ENCOUNTER — TELEPHONE (OUTPATIENT)
Dept: GASTROENTEROLOGY | Age: 49
End: 2024-06-11

## 2024-07-15 ENCOUNTER — APPOINTMENT (OUTPATIENT)
Dept: GENERAL RADIOLOGY | Age: 49
End: 2024-07-15
Payer: COMMERCIAL

## 2024-07-15 ENCOUNTER — HOSPITAL ENCOUNTER (EMERGENCY)
Age: 49
Discharge: HOME | End: 2024-07-15
Attending: EMERGENCY MEDICINE
Payer: COMMERCIAL

## 2024-07-15 VITALS
HEIGHT: 64 IN | TEMPERATURE: 98.1 F | WEIGHT: 176 LBS | RESPIRATION RATE: 16 BRPM | OXYGEN SATURATION: 100 % | BODY MASS INDEX: 30.05 KG/M2 | SYSTOLIC BLOOD PRESSURE: 132 MMHG | HEART RATE: 80 BPM | DIASTOLIC BLOOD PRESSURE: 77 MMHG

## 2024-07-15 DIAGNOSIS — R51.9 ACUTE NONINTRACTABLE HEADACHE, UNSPECIFIED HEADACHE TYPE: Primary | ICD-10-CM

## 2024-07-15 LAB
ANION GAP SERPL CALCULATED.3IONS-SCNC: 14 MMOL/L (ref 9–17)
BACTERIA URNS QL MICRO: ABNORMAL
BASOPHILS # BLD: 0.1 K/UL (ref 0–0.2)
BASOPHILS NFR BLD: 1 % (ref 0–2)
BILIRUB UR QL STRIP: NEGATIVE
BUN SERPL-MCNC: 19 MG/DL (ref 6–20)
CALCIUM SERPL-MCNC: 9.3 MG/DL (ref 8.6–10.4)
CHLORIDE SERPL-SCNC: 103 MMOL/L (ref 98–107)
CLARITY UR: CLEAR
CO2 SERPL-SCNC: 23 MMOL/L (ref 20–31)
COLOR UR: YELLOW
CREAT SERPL-MCNC: 1 MG/DL (ref 0.5–0.9)
EKG ATRIAL RATE: 83 BPM
EKG P AXIS: 54 DEGREES
EKG P-R INTERVAL: 144 MS
EKG Q-T INTERVAL: 358 MS
EKG QRS DURATION: 72 MS
EKG QTC CALCULATION (BAZETT): 420 MS
EKG R AXIS: -2 DEGREES
EKG T AXIS: 11 DEGREES
EKG VENTRICULAR RATE: 83 BPM
EOSINOPHIL # BLD: 0.1 K/UL (ref 0–0.4)
EOSINOPHILS RELATIVE PERCENT: 1 % (ref 1–4)
EPI CELLS #/AREA URNS HPF: ABNORMAL /HPF (ref 0–5)
ERYTHROCYTE [DISTWIDTH] IN BLOOD BY AUTOMATED COUNT: 13.3 % (ref 12.5–15.4)
GFR, ESTIMATED: 69 ML/MIN/1.73M2
GLUCOSE SERPL-MCNC: 95 MG/DL (ref 70–99)
GLUCOSE UR STRIP-MCNC: NEGATIVE MG/DL
HCT VFR BLD AUTO: 42.9 % (ref 36–46)
HGB BLD-MCNC: 15 G/DL (ref 12–16)
HGB UR QL STRIP.AUTO: NEGATIVE
KETONES UR STRIP-MCNC: NEGATIVE MG/DL
LEUKOCYTE ESTERASE UR QL STRIP: NEGATIVE
LYMPHOCYTES NFR BLD: 2.9 K/UL (ref 1–4.8)
LYMPHOCYTES RELATIVE PERCENT: 39 % (ref 24–44)
MCH RBC QN AUTO: 30.4 PG (ref 26–34)
MCHC RBC AUTO-ENTMCNC: 34.9 G/DL (ref 31–37)
MCV RBC AUTO: 86.9 FL (ref 80–100)
MONOCYTES NFR BLD: 0.5 K/UL (ref 0.1–1.2)
MONOCYTES NFR BLD: 7 % (ref 2–11)
MUCOUS THREADS URNS QL MICRO: ABNORMAL
NEUTROPHILS NFR BLD: 52 % (ref 36–66)
NEUTS SEG NFR BLD: 3.9 K/UL (ref 1.8–7.7)
NITRITE UR QL STRIP: NEGATIVE
PH UR STRIP: 6 [PH] (ref 5–8)
PLATELET # BLD AUTO: 264 K/UL (ref 140–450)
PMV BLD AUTO: 7.7 FL (ref 6–12)
POTASSIUM SERPL-SCNC: 3.6 MMOL/L (ref 3.7–5.3)
PROT UR STRIP-MCNC: NEGATIVE MG/DL
RBC # BLD AUTO: 4.93 M/UL (ref 4–5.2)
RBC #/AREA URNS HPF: ABNORMAL /HPF (ref 0–2)
SODIUM SERPL-SCNC: 140 MMOL/L (ref 135–144)
SP GR UR STRIP: 1.02 (ref 1–1.03)
TROPONIN I SERPL HS-MCNC: <6 NG/L (ref 0–14)
TSH SERPL DL<=0.05 MIU/L-ACNC: 2.19 UIU/ML (ref 0.3–5)
UROBILINOGEN UR STRIP-ACNC: NORMAL EU/DL (ref 0–1)
WBC #/AREA URNS HPF: ABNORMAL /HPF (ref 0–5)
WBC OTHER # BLD: 7.5 K/UL (ref 3.5–11)

## 2024-07-15 PROCEDURE — 99285 EMERGENCY DEPT VISIT HI MDM: CPT

## 2024-07-15 PROCEDURE — 80048 BASIC METABOLIC PNL TOTAL CA: CPT

## 2024-07-15 PROCEDURE — 84443 ASSAY THYROID STIM HORMONE: CPT

## 2024-07-15 PROCEDURE — 2580000003 HC RX 258

## 2024-07-15 PROCEDURE — 96375 TX/PRO/DX INJ NEW DRUG ADDON: CPT

## 2024-07-15 PROCEDURE — 85025 COMPLETE CBC W/AUTO DIFF WBC: CPT

## 2024-07-15 PROCEDURE — 81001 URINALYSIS AUTO W/SCOPE: CPT

## 2024-07-15 PROCEDURE — 96365 THER/PROPH/DIAG IV INF INIT: CPT

## 2024-07-15 PROCEDURE — 84484 ASSAY OF TROPONIN QUANT: CPT

## 2024-07-15 PROCEDURE — 6360000002 HC RX W HCPCS

## 2024-07-15 PROCEDURE — 71045 X-RAY EXAM CHEST 1 VIEW: CPT

## 2024-07-15 RX ORDER — METOCLOPRAMIDE HYDROCHLORIDE 5 MG/ML
10 INJECTION INTRAMUSCULAR; INTRAVENOUS ONCE
Status: COMPLETED | OUTPATIENT
Start: 2024-07-15 | End: 2024-07-15

## 2024-07-15 RX ORDER — MAGNESIUM SULFATE 1 G/100ML
1000 INJECTION INTRAVENOUS ONCE
Status: COMPLETED | OUTPATIENT
Start: 2024-07-15 | End: 2024-07-15

## 2024-07-15 RX ORDER — DIPHENHYDRAMINE HYDROCHLORIDE 50 MG/ML
25 INJECTION INTRAMUSCULAR; INTRAVENOUS ONCE
Status: COMPLETED | OUTPATIENT
Start: 2024-07-15 | End: 2024-07-15

## 2024-07-15 RX ORDER — 0.9 % SODIUM CHLORIDE 0.9 %
1000 INTRAVENOUS SOLUTION INTRAVENOUS ONCE
Status: COMPLETED | OUTPATIENT
Start: 2024-07-15 | End: 2024-07-15

## 2024-07-15 RX ORDER — KETOROLAC TROMETHAMINE 15 MG/ML
15 INJECTION, SOLUTION INTRAMUSCULAR; INTRAVENOUS ONCE
Status: COMPLETED | OUTPATIENT
Start: 2024-07-15 | End: 2024-07-15

## 2024-07-15 RX ADMIN — KETOROLAC TROMETHAMINE 15 MG: 15 INJECTION, SOLUTION INTRAMUSCULAR; INTRAVENOUS at 09:27

## 2024-07-15 RX ADMIN — DIPHENHYDRAMINE HYDROCHLORIDE 25 MG: 50 INJECTION INTRAMUSCULAR; INTRAVENOUS at 09:27

## 2024-07-15 RX ADMIN — MAGNESIUM SULFATE HEPTAHYDRATE 1000 MG: 1 INJECTION, SOLUTION INTRAVENOUS at 09:31

## 2024-07-15 RX ADMIN — METOCLOPRAMIDE 10 MG: 5 INJECTION, SOLUTION INTRAMUSCULAR; INTRAVENOUS at 09:27

## 2024-07-15 RX ADMIN — SODIUM CHLORIDE 1000 ML: 9 INJECTION, SOLUTION INTRAVENOUS at 09:25

## 2024-07-15 ASSESSMENT — ENCOUNTER SYMPTOMS
VOMITING: 0
SINUS PRESSURE: 0
COUGH: 0
PHOTOPHOBIA: 0
SINUS PAIN: 0
CHEST TIGHTNESS: 0
ABDOMINAL PAIN: 0
SORE THROAT: 0
SHORTNESS OF BREATH: 0
DIARRHEA: 0
CONSTIPATION: 0
NAUSEA: 0

## 2024-07-15 ASSESSMENT — LIFESTYLE VARIABLES
HOW OFTEN DO YOU HAVE A DRINK CONTAINING ALCOHOL: NEVER
HOW MANY STANDARD DRINKS CONTAINING ALCOHOL DO YOU HAVE ON A TYPICAL DAY: PATIENT DOES NOT DRINK

## 2024-07-15 ASSESSMENT — PAIN - FUNCTIONAL ASSESSMENT: PAIN_FUNCTIONAL_ASSESSMENT: 0-10

## 2024-07-15 ASSESSMENT — HEART SCORE: ECG: NORMAL

## 2024-07-15 ASSESSMENT — PAIN SCALES - GENERAL: PAINLEVEL_OUTOF10: 4

## 2024-07-15 NOTE — ED PROVIDER NOTES
Ref Range    Sodium 140 135 - 144 mmol/L    Potassium 3.6 (L) 3.7 - 5.3 mmol/L    Chloride 103 98 - 107 mmol/L    CO2 23 20 - 31 mmol/L    Anion Gap 14 9 - 17 mmol/L    Glucose 95 70 - 99 mg/dL    BUN 19 6 - 20 mg/dL    Creatinine 1.0 (H) 0.5 - 0.9 mg/dL    Est, Glom Filt Rate 69 >60 mL/min/1.73m2    Calcium 9.3 8.6 - 10.4 mg/dL   Troponin   Result Value Ref Range    Troponin, High Sensitivity <6 0 - 14 ng/L   Urinalysis with Microscopic   Result Value Ref Range    Color, UA Yellow Yellow    Turbidity UA Clear Clear    Glucose, Ur NEGATIVE NEGATIVE mg/dL    Bilirubin, Urine NEGATIVE NEGATIVE    Ketones, Urine NEGATIVE NEGATIVE mg/dL    Specific Gravity, UA 1.025 1.005 - 1.030    Urine Hgb NEGATIVE NEGATIVE    pH, Urine 6.0 5.0 - 8.0    Protein, UA NEGATIVE NEGATIVE mg/dL    Urobilinogen, Urine Normal 0.0 - 1.0 EU/dL    Nitrite, Urine NEGATIVE NEGATIVE    Leukocyte Esterase, Urine NEGATIVE NEGATIVE    WBC, UA 0 TO 2 0 - 5 /HPF    RBC, UA 0 TO 2 0 - 2 /HPF    Epithelial Cells, UA 0 TO 2 0 - 5 /HPF    Bacteria, UA None None    Mucus, UA 1+ (A) None   TSH with Reflex   Result Value Ref Range    TSH 2.19 0.30 - 5.00 uIU/mL   EKG 12 Lead   Result Value Ref Range    Ventricular Rate 83 BPM    Atrial Rate 83 BPM    P-R Interval 144 ms    QRS Duration 72 ms    Q-T Interval 358 ms    QTc Calculation (Bazett) 420 ms    P Axis 54 degrees    R Axis -2 degrees    T Axis 11 degrees       Not indicated unless otherwise documented above    RADIOLOGY:   I reviewed the radiologist interpretations:    XR CHEST PORTABLE   Final Result   No acute airspace disease identified.             Not indicated unless otherwise documented above    EMERGENCY DEPARTMENT COURSE:     The patient was given the following medications:  Orders Placed This Encounter   Medications    sodium chloride 0.9 % bolus 1,000 mL    diphenhydrAMINE (BENADRYL) injection 25 mg    metoclopramide (REGLAN) injection 10 mg    ketorolac (TORADOL) injection 15 mg    magnesium 
acute airspace disease identified.        LABS:  Results for orders placed or performed during the hospital encounter of 07/15/24   CBC with Auto Differential   Result Value Ref Range    WBC 7.5 3.5 - 11.0 k/uL    RBC 4.93 4.0 - 5.2 m/uL    Hemoglobin 15.0 12.0 - 16.0 g/dL    Hematocrit 42.9 36 - 46 %    MCV 86.9 80 - 100 fL    MCH 30.4 26 - 34 pg    MCHC 34.9 31 - 37 g/dL    RDW 13.3 12.5 - 15.4 %    Platelets 264 140 - 450 k/uL    MPV 7.7 6.0 - 12.0 fL    Neutrophils % 52 36 - 66 %    Lymphocytes % 39 24 - 44 %    Monocytes % 7 2 - 11 %    Eosinophils % 1 1 - 4 %    Basophils % 1 0 - 2 %    Neutrophils Absolute 3.90 1.8 - 7.7 k/uL    Lymphocytes Absolute 2.90 1.0 - 4.8 k/uL    Monocytes Absolute 0.50 0.1 - 1.2 k/uL    Eosinophils Absolute 0.10 0.0 - 0.4 k/uL    Basophils Absolute 0.10 0.0 - 0.2 k/uL   BMP   Result Value Ref Range    Sodium 140 135 - 144 mmol/L    Potassium 3.6 (L) 3.7 - 5.3 mmol/L    Chloride 103 98 - 107 mmol/L    CO2 23 20 - 31 mmol/L    Anion Gap 14 9 - 17 mmol/L    Glucose 95 70 - 99 mg/dL    BUN 19 6 - 20 mg/dL    Creatinine 1.0 (H) 0.5 - 0.9 mg/dL    Est, Glom Filt Rate 69 >60 mL/min/1.73m2    Calcium 9.3 8.6 - 10.4 mg/dL   Troponin   Result Value Ref Range    Troponin, High Sensitivity <6 0 - 14 ng/L   Urinalysis with Microscopic   Result Value Ref Range    Color, UA Yellow Yellow    Turbidity UA Clear Clear    Glucose, Ur NEGATIVE NEGATIVE mg/dL    Bilirubin, Urine NEGATIVE NEGATIVE    Ketones, Urine NEGATIVE NEGATIVE mg/dL    Specific Gravity, UA 1.025 1.005 - 1.030    Urine Hgb NEGATIVE NEGATIVE    pH, Urine 6.0 5.0 - 8.0    Protein, UA NEGATIVE NEGATIVE mg/dL    Urobilinogen, Urine Normal 0.0 - 1.0 EU/dL    Nitrite, Urine NEGATIVE NEGATIVE    Leukocyte Esterase, Urine NEGATIVE NEGATIVE    WBC, UA 0 TO 2 0 - 5 /HPF    RBC, UA 0 TO 2 0 - 2 /HPF    Epithelial Cells, UA 0 TO 2 0 - 5 /HPF    Bacteria, UA None None    Mucus, UA 1+ (A) None   TSH with Reflex   Result Value Ref Range    TSH

## 2024-07-15 NOTE — DISCHARGE INSTRUCTIONS
Please call and schedule follow-up appointment with your primary care provider.    Please call and schedule follow-up appointment with your neurologist for further evaluation and treatment recommendations regarding your headaches.    Continue all medication as previously prescribed including your Ajovy and naratriptan.    For pain use acetaminophen (Tylenol) or ibuprofen (Motrin / Advil), unless prescribed medications that have acetaminophen or ibuprofen (or similar medications) in it.  You can take over the counter acetaminophen tablets (1 - 2 tablets of the 500-mg strength every 6 hours) or ibuprofen tablets (2 tablets every 4 hours).   Avoid taking narcotics for headaches (can cause a worse headache in several hours after taking the medication).      Make sure that you drink plenty of water or Gatorade (or similar solution) to keep yourself hydrated.  Try over-the-counter magnesium oxide nightly for migraine headache prevention.    Smoking cigarettes or being around anyone that smokes cigarettes can cause constriction of the blood vessels in the brain which in turn can cause you to have further headaches.    PLEASE RETURN TO THE EMERGENCY DEPARTMENT IMMEDIATELY for worsening symptoms, change in vision / hearing / taste, ringing in your ears, loss of sensation or difficulty moving your arms or legs, or if you develop any concerning symptoms such as: high fever not relieved by acetaminophen (Tylenol) and/or ibuprofen (Motrin / Advil), chills, shortness of breath, chest pain, feeling of your heart fluttering or racing, persistent nausea and/or vomiting, vomiting up blood, blood in your stool, numbness, loss of consciousness, weakness or tingling in the arms or legs or change in color of the extremities, changes in mental status, persistent headache, blurry vision, loss of bladder / bowel control, unable to follow up with your physician, or other any other care or concern

## 2024-08-16 ENCOUNTER — PATIENT MESSAGE (OUTPATIENT)
Dept: PRIMARY CARE CLINIC | Age: 49
End: 2024-08-16

## 2024-08-16 DIAGNOSIS — R00.0 TACHYCARDIA: ICD-10-CM

## 2024-08-16 DIAGNOSIS — U07.1 COVID: Primary | ICD-10-CM

## 2024-08-16 RX ORDER — METOPROLOL SUCCINATE 25 MG/1
25 TABLET, EXTENDED RELEASE ORAL DAILY
Qty: 30 TABLET | Refills: 0 | Status: SHIPPED | OUTPATIENT
Start: 2024-08-16

## 2024-09-08 DIAGNOSIS — R00.0 TACHYCARDIA: ICD-10-CM

## 2024-09-09 RX ORDER — METOPROLOL SUCCINATE 25 MG/1
25 TABLET, EXTENDED RELEASE ORAL DAILY
Qty: 30 TABLET | Refills: 1 | Status: SHIPPED | OUTPATIENT
Start: 2024-09-09

## 2024-10-01 ENCOUNTER — HOSPITAL ENCOUNTER (OUTPATIENT)
Age: 49
Setting detail: SPECIMEN
Discharge: HOME | End: 2024-10-01

## 2024-10-01 ENCOUNTER — OFFICE VISIT (OUTPATIENT)
Dept: PRIMARY CARE CLINIC | Age: 49
End: 2024-10-01
Payer: COMMERCIAL

## 2024-10-01 VITALS
HEIGHT: 64 IN | DIASTOLIC BLOOD PRESSURE: 86 MMHG | WEIGHT: 175.8 LBS | BODY MASS INDEX: 30.01 KG/M2 | SYSTOLIC BLOOD PRESSURE: 122 MMHG | HEART RATE: 85 BPM | OXYGEN SATURATION: 99 %

## 2024-10-01 DIAGNOSIS — Z12.4 CERVICAL CANCER SCREENING: ICD-10-CM

## 2024-10-01 DIAGNOSIS — N89.8 VAGINAL DISCHARGE: ICD-10-CM

## 2024-10-01 DIAGNOSIS — D48.5 NEOPLASM OF UNCERTAIN BEHAVIOR OF SKIN: ICD-10-CM

## 2024-10-01 DIAGNOSIS — Z01.419 ENCOUNTER FOR GYNECOLOGICAL EXAMINATION: Primary | ICD-10-CM

## 2024-10-01 DIAGNOSIS — G43.829 MENSTRUAL MIGRAINE WITHOUT STATUS MIGRAINOSUS, NOT INTRACTABLE: ICD-10-CM

## 2024-10-01 DIAGNOSIS — N81.83 WEAKENING OF RECTOVAGINAL TISSUE: ICD-10-CM

## 2024-10-01 DIAGNOSIS — Z12.31 ENCOUNTER FOR SCREENING MAMMOGRAM FOR BREAST CANCER: ICD-10-CM

## 2024-10-01 LAB
CANDIDA SPECIES: NEGATIVE
GARDNERELLA VAGINALIS: POSITIVE
SOURCE: ABNORMAL
TRICHOMONAS: NEGATIVE

## 2024-10-01 PROCEDURE — 3074F SYST BP LT 130 MM HG: CPT | Performed by: PHYSICIAN ASSISTANT

## 2024-10-01 PROCEDURE — 3079F DIAST BP 80-89 MM HG: CPT | Performed by: PHYSICIAN ASSISTANT

## 2024-10-01 PROCEDURE — 99396 PREV VISIT EST AGE 40-64: CPT | Performed by: PHYSICIAN ASSISTANT

## 2024-10-01 PROCEDURE — 99212 OFFICE O/P EST SF 10 MIN: CPT | Performed by: PHYSICIAN ASSISTANT

## 2024-10-01 RX ORDER — FREMANEZUMAB-VFRM 225 MG/1.5ML
225 INJECTION SUBCUTANEOUS
Qty: 1 EACH | Refills: 5 | Status: SHIPPED | OUTPATIENT
Start: 2024-10-01

## 2024-10-01 SDOH — ECONOMIC STABILITY: INCOME INSECURITY: HOW HARD IS IT FOR YOU TO PAY FOR THE VERY BASICS LIKE FOOD, HOUSING, MEDICAL CARE, AND HEATING?: NOT HARD AT ALL

## 2024-10-01 SDOH — ECONOMIC STABILITY: FOOD INSECURITY: WITHIN THE PAST 12 MONTHS, YOU WORRIED THAT YOUR FOOD WOULD RUN OUT BEFORE YOU GOT MONEY TO BUY MORE.: NEVER TRUE

## 2024-10-01 SDOH — ECONOMIC STABILITY: FOOD INSECURITY: WITHIN THE PAST 12 MONTHS, THE FOOD YOU BOUGHT JUST DIDN'T LAST AND YOU DIDN'T HAVE MONEY TO GET MORE.: NEVER TRUE

## 2024-10-01 ASSESSMENT — ENCOUNTER SYMPTOMS
CONSTIPATION: 1
RECTAL PAIN: 0
BLOOD IN STOOL: 0
ANAL BLEEDING: 0

## 2024-10-01 NOTE — PROGRESS NOTES
Smoking status: Never    Smokeless tobacco: Never    Tobacco comments:     Never   Vaping Use    Vaping status: Never Used   Substance and Sexual Activity    Alcohol use: Not Currently     Comment: Maybe up to 5 drinks a year    Drug use: No    Sexual activity: Yes     Partners: Male     Comment: Spouse   Other Topics Concern    Not on file   Social History Narrative    Not on file     Social Determinants of Health     Financial Resource Strain: Low Risk  (10/1/2024)    Overall Financial Resource Strain (CARDIA)     Difficulty of Paying Living Expenses: Not hard at all   Food Insecurity: No Food Insecurity (10/1/2024)    Hunger Vital Sign     Worried About Running Out of Food in the Last Year: Never true     Ran Out of Food in the Last Year: Never true   Transportation Needs: Unknown (10/1/2024)    PRAPARE - Transportation     Lack of Transportation (Medical): Not on file     Lack of Transportation (Non-Medical): No   Physical Activity: Not on file   Stress: Not on file   Social Connections: Not on file   Intimate Partner Violence: Not on file   Housing Stability: Unknown (10/1/2024)    Housing Stability Vital Sign     Unable to Pay for Housing in the Last Year: Not on file     Number of Times Moved in the Last Year: Not on file     Homeless in the Last Year: No       HPI:     Last Pap:  was normal  Last Menstrual Period:No LMP recorded. Patient has had a hysterectomy.  Problems with menstruation: N/A  Hysterectomy:  Yes   Ovaries:  Yes     Removed for fibroids  Signs of Menopause:  No  none  Pregnancies:  Yes     SexualActivity:  Yes   Current:  1     STD History:  none  Birth Control:  No  Breast Concerns:  Yes, small lump in between breasts  Vaginal [place]:  Having some BV infections. Used to use boric acid suppository   Having constipation now and thinks she may have a rectocele as she has to push down on a lump to help pass a bowel movement.   Last Colonoscopy:  2022--normal --Dr. Grant  Last Mammogram:

## 2024-10-02 RX ORDER — METRONIDAZOLE 250 MG/1
250 TABLET ORAL 3 TIMES DAILY
Qty: 21 TABLET | Refills: 0 | Status: SHIPPED | OUTPATIENT
Start: 2024-10-02 | End: 2024-10-09

## 2024-10-03 LAB
HPV I/H RISK 4 DNA CVX QL NAA+PROBE: NOT DETECTED
HPV SAMPLE: NORMAL
HPV, INTERPRETATION: NORMAL
HPV16 DNA CVX QL NAA+PROBE: NOT DETECTED
HPV18 DNA CVX QL NAA+PROBE: NOT DETECTED
SPECIMEN DESCRIPTION: NORMAL

## 2024-10-09 LAB — CYTOLOGY REPORT: NORMAL

## 2024-10-26 NOTE — PROGRESS NOTES
Skin Biopsy Procedure Note  10/28/2024  Charlee Hernandez  1975    /88   Pulse 75   SpO2 99%   VITALS REVIEWED    Allergies   Allergen Reactions    Bupropion Palpitations       Chief Complaint   Patient presents with    Procedure     X2 biopsy, right breast and right lower leg.       Lesion:  1. Right breast   2. Right lower leg  3. Left lower leg      Indication for Biopsy 1: Very dark lesion with irregular border  Indication for Biopsy 2: non healing lesion---dermatofibroma  Indication for Biopsy 3:  non healing lesion    Procedure:  The lesion(s) were cleansed with Alcohol.  2% lidocaine with epinephrine was used for local anaesthesia.  1.A 6 mm  punch was used to obtain a specimen.    2. A  1 cm shave was used. 3. A 10mm shave was used. 1. Wound closed with  3, 4-0 nylon sutures. The specimen(s) 1 and 3 only were    preserved and sent for pathological examination.  The biopsy site(s) were  cleansed and hemostasis using ACl  and a pressure dressing(s) were achieved with good results.  The patient was instructed on wound care.  No complications occurred and the patient tolerated the procedure well.     1. Neoplasm of uncertain behavior of skin  -     Surgical Pathology; Future  -     Surgical Pathology; Future       Follow Up:  Wound care discussed.  Keep well moisturized.  Watch for signs of infection which would include:  Redness, swelling, fever.  If signs appear, please return to office.  Return in about 10 days (around 11/7/2024) for Suture removal.       Electronically signed by Carol Eldridge PA-C on 10/28/2024 at 4:10 PM

## 2024-10-28 ENCOUNTER — PROCEDURE VISIT (OUTPATIENT)
Dept: PRIMARY CARE CLINIC | Age: 49
End: 2024-10-28

## 2024-10-28 VITALS — DIASTOLIC BLOOD PRESSURE: 88 MMHG | HEART RATE: 75 BPM | OXYGEN SATURATION: 99 % | SYSTOLIC BLOOD PRESSURE: 120 MMHG

## 2024-10-28 DIAGNOSIS — D48.5 NEOPLASM OF UNCERTAIN BEHAVIOR OF SKIN: Primary | ICD-10-CM

## 2024-10-30 ENCOUNTER — LAB (OUTPATIENT)
Dept: LAB | Age: 49
End: 2024-10-30

## 2024-11-01 RX ORDER — POLYETHYLENE GLYCOL 3350 17 G/17G
POWDER, FOR SOLUTION ORAL NIGHTLY
COMMUNITY
Start: 2024-10-26

## 2024-11-05 NOTE — PROGRESS NOTES
[facility], [facility] UROGYNECOLOGY AND PELVIC REHABILITATION   [address] [place] [address]  Dept: [phone]  Date: 2024  Patient Name: Charlee Hernandez    VISIT - NEW PATIENT VISIT     CC: had concerns including New Patient (Patient had a hysterectomy in 2018 ovaries were saved. Patient has not had a annual exam until just recently from her PCP. Patient does not follow with a gyn. ).    HPI: This is a 49 year old  new patient, here to discuss pelvic organ prolapse. She reports that she has been telling her  for \"years\" that something has felt something is falling. She reports the most bothersome discomfort is having difficulty emptying her bowels. She will need to splint daily to empty with bowel movements. She denies any FI. She reports that her colonoscopy is up to date; last one in . She denies any blood in stool.  She denies any family history of colon cancers. She will have some urinary incontinence, reporting some leakage with laugh, cough, jump. She does need to wear a panty liner. She denies any urinary urgency or urge related incontinence. Nocturia 0-1# per night, denies enuresis, denies hematuria. Denies recurrent UTIs; last known UTI was one year ago. Denies any known history of kidney stones. She is sexually active, with one male partner. No PCB, no concerns for STIs. She does report dry, painful intercourse, at introitus. She will use KY jelly to help with this. She does not use VET. She has not had any previous bladder surgeries. She does report ongoing bloating. She does report that it will feel that she is sitting on a ball or egg. She does take Miralax nightly.       How long have you noticed the prolapse: 6 month(s)     Interested in All options     Urinary Incontinence:  at times   Do you wear pads: yes  Pad Size: linear  How often do you have to change the pad: 3        Do you have trouble voiding,

## 2024-11-06 ENCOUNTER — OFFICE VISIT (OUTPATIENT)
Age: 49
End: 2024-11-06
Payer: COMMERCIAL

## 2024-11-06 VITALS
SYSTOLIC BLOOD PRESSURE: 122 MMHG | HEIGHT: 64 IN | BODY MASS INDEX: 29.88 KG/M2 | OXYGEN SATURATION: 95 % | DIASTOLIC BLOOD PRESSURE: 81 MMHG | TEMPERATURE: 98.3 F | HEART RATE: 90 BPM | WEIGHT: 175 LBS

## 2024-11-06 DIAGNOSIS — K59.00 CONSTIPATION, UNSPECIFIED CONSTIPATION TYPE: ICD-10-CM

## 2024-11-06 DIAGNOSIS — R33.9 URINARY RETENTION: ICD-10-CM

## 2024-11-06 DIAGNOSIS — N81.9 FEMALE GENITAL PROLAPSE, UNSPECIFIED TYPE: Primary | ICD-10-CM

## 2024-11-06 DIAGNOSIS — N95.2 VAGINAL ATROPHY: ICD-10-CM

## 2024-11-06 DIAGNOSIS — R14.0 BLOATING: ICD-10-CM

## 2024-11-06 DIAGNOSIS — N39.3 SUI (STRESS URINARY INCONTINENCE), MALE: ICD-10-CM

## 2024-11-06 DIAGNOSIS — N81.10 BADEN-WALKER GRADE 1 CYSTOCELE: ICD-10-CM

## 2024-11-06 LAB
BILIRUBIN, POC: NORMAL
BLOOD URINE, POC: NORMAL
CLARITY, POC: NORMAL
COLOR, POC: NORMAL
GLUCOSE URINE, POC: NORMAL MG/DL
KETONES, POC: NORMAL MG/DL
LEUKOCYTE EST, POC: NORMAL
NITRITE, POC: NORMAL
PH, POC: NORMAL
PROTEIN, POC: NORMAL MG/DL
SPECIFIC GRAVITY, POC: 1.02
UROBILINOGEN, POC: NORMAL MG/DL

## 2024-11-06 PROCEDURE — 81003 URINALYSIS AUTO W/O SCOPE: CPT

## 2024-11-06 PROCEDURE — 99204 OFFICE O/P NEW MOD 45 MIN: CPT

## 2024-11-06 PROCEDURE — 3074F SYST BP LT 130 MM HG: CPT

## 2024-11-06 PROCEDURE — 3079F DIAST BP 80-89 MM HG: CPT

## 2024-11-06 PROCEDURE — 51798 US URINE CAPACITY MEASURE: CPT

## 2024-11-06 ASSESSMENT — ENCOUNTER SYMPTOMS: CONSTIPATION: 1

## 2024-11-06 NOTE — PATIENT INSTRUCTIONS
Vaginal Hyaluronic acid - online [employer]  Replens - at store  MLT - not covered by insurance     [facility] UROGYNECOLOGY [facility] PELVIC REH[facility]    DIET & DAILY HABITS  Can this affect your bladder or bowel control?    There is no “diet” to cure urinary or fecal problems.  However, there are certain dietary matters you should be concerned about.    Many people who have bladder control problems reduce the amount of liquids they drink in hope they will urinate less often.  Although less fluid intake results in less urine production, the smaller amount is more concentrated and thus, is more irritating to the bladder surface.  Highly concentrated urine (dark yellow, strong-smelling) urine may cause you to go to the bathroom more frequently.  It also encourages the growth of bacteria, which may lead to a urinary tract infection.  Do not restrict fluids to control incontinence without advice from your physician.  Always follow your health care provider's instructions.    Some foods cause urine to smell bad or peculiar.  The most notable food is asparagus.  Another cause of foul-smelling urine, and the most dangerous cause, is urinary tract infection.  If you notice that your urine has a strong odor and you have not eaten any foods that would cause this, you should see a health care provider and have a specimen of your urine tested for infection.    Some medicines may cause your urine to be discolored or have an unusual odor.  Some are medicines that you take for bladder inflammation or for urine tests.  If your urine has a peculiar color or odor, consult the pharmacist who filled your prescription.    Some foods and beverages are though to contribute to bladder leakage.  Their effect of the bladder is not always understood, but you may want to see if eliminating one or all of the items listed improves your urine control.      Common irritants include: alcohol, carbonated beverages, caffeine, milk, coffee (even

## 2024-11-06 NOTE — PROGRESS NOTES
How long have you noticed the prolapse: 6 month(s)    Interested in All options    Urinary Incontinence:  at times   Do you wear pads: yes  Pad Size: linear  How often do you have to change the pad: 3      Do you have trouble voiding, expelling, or having a bowel movement: yes, bowel movement   Do you reduce prolapse: yes  Do you splint: yes  Do you struggle w/ constipation: yes  Do you do heavy lifting: yes  Are you sexually active: yes  If not currently sexually active, are you interested in becoming sexually active:  n/a    Any H/O of UTI's: yes  If yes, were there cultures done: yes

## 2024-11-07 ENCOUNTER — NURSE ONLY (OUTPATIENT)
Dept: PRIMARY CARE CLINIC | Age: 49
End: 2024-11-07

## 2024-11-07 DIAGNOSIS — Z48.02 ENCOUNTER FOR REMOVAL OF SUTURES: Primary | ICD-10-CM

## 2024-11-07 NOTE — PROGRESS NOTES
Patient was seen today for suture(s) removal.    3 suture(s) were placed on patients right chest wall on 10/28/2024. The area looks clean and dry. Noted: scabbing, no signs of infection.    3 suture(s) were removed, patient handled the removal well and the area looked to be healing well. I applied a small bead of Vaseline ointment.    Patient advised to keep the area clean and dry, no scrubbing the are until fully healed, to contact the office with signs of infection or questions regarding healing. Patient verbalized understanding.

## 2024-11-20 ENCOUNTER — OFFICE VISIT (OUTPATIENT)
Dept: GASTROENTEROLOGY | Age: 49
End: 2024-11-20
Payer: COMMERCIAL

## 2024-11-20 VITALS
WEIGHT: 177.8 LBS | TEMPERATURE: 97.5 F | BODY MASS INDEX: 30.35 KG/M2 | HEIGHT: 64 IN | RESPIRATION RATE: 19 BRPM | OXYGEN SATURATION: 100 % | HEART RATE: 85 BPM

## 2024-11-20 DIAGNOSIS — K59.00 CONSTIPATION, UNSPECIFIED CONSTIPATION TYPE: ICD-10-CM

## 2024-11-20 DIAGNOSIS — K22.70 BARRETT'S ESOPHAGUS WITHOUT DYSPLASIA: Primary | ICD-10-CM

## 2024-11-20 PROCEDURE — 99214 OFFICE O/P EST MOD 30 MIN: CPT | Performed by: INTERNAL MEDICINE

## 2024-11-20 PROCEDURE — G2211 COMPLEX E/M VISIT ADD ON: HCPCS | Performed by: INTERNAL MEDICINE

## 2024-11-20 RX ORDER — SENNOSIDES 8.6 MG
2 TABLET ORAL
Qty: 90 TABLET | Refills: 1 | Status: SHIPPED | OUTPATIENT
Start: 2024-11-20 | End: 2025-02-18

## 2024-11-20 ASSESSMENT — ENCOUNTER SYMPTOMS
VOMITING: 1
BLOOD IN STOOL: 0
WHEEZING: 0
CONSTIPATION: 0
DIARRHEA: 0
COUGH: 0
VOICE CHANGE: 0
ABDOMINAL DISTENTION: 1
ABDOMINAL PAIN: 0
NAUSEA: 1
RECTAL PAIN: 0
TROUBLE SWALLOWING: 0
SORE THROAT: 0
ANAL BLEEDING: 0
COLOR CHANGE: 0
CHOKING: 0

## 2024-11-25 ENCOUNTER — HOSPITAL ENCOUNTER (OUTPATIENT)
Dept: WOMENS IMAGING | Age: 49
Discharge: HOME | End: 2024-11-27
Payer: COMMERCIAL

## 2024-11-25 VITALS — WEIGHT: 177 LBS | HEIGHT: 64 IN | BODY MASS INDEX: 30.22 KG/M2

## 2024-11-25 DIAGNOSIS — Z12.31 ENCOUNTER FOR SCREENING MAMMOGRAM FOR BREAST CANCER: ICD-10-CM

## 2024-11-25 PROCEDURE — 77063 BREAST TOMOSYNTHESIS BI: CPT

## 2024-11-26 DIAGNOSIS — R92.8 ABNORMAL MAMMOGRAM: Primary | ICD-10-CM

## 2024-11-29 ENCOUNTER — HOSPITAL ENCOUNTER (OUTPATIENT)
Dept: ULTRASOUND IMAGING | Age: 49
Discharge: HOME | End: 2024-12-01
Payer: COMMERCIAL

## 2024-11-29 DIAGNOSIS — R14.0 BLOATING: ICD-10-CM

## 2024-11-29 PROCEDURE — 76830 TRANSVAGINAL US NON-OB: CPT

## 2024-12-04 NOTE — PROGRESS NOTES
readout.  Manual testing showed a positive RAIR at 50 mL.  First sensation at 50 mL first desire 100 mL capacity 150 mL.  The patient could expel a 30 mL balloon with 1 mm/s distraction within 45 seconds.      The supervising physician was present for and completed the critical and key portions of the entire surgery/procedure/ OR management and was immediately available to assist during resident training as appropriate for the case. The patient tolerated the procedure well and was given an instructional sheet for post procedural recommendations.  The patient understands to call the office if there are any problems or questions.    VISIT RESULTS:  No results found for any visits on 12/05/24.     ASSESSMENT/PLAN:    Female genital prolapse, unspecified type  Urinary retention  Bloating  Constipation, unspecified constipation type  [place]ak[place]-Walker grade 1 cystocele  ROCHELLE (stress urinary incontinence), male  Vaginal atrophy  Rectocele, female  Pelvic floor dysfunction       The patient was counseled regarding review of all conditions discussed.   Patient recently saw a gastroenterologist who recommended senna over MiraLAX.  She does not have a sphincter issue and she does not have a neurologic issue with her colon.  She does have some discoordination of the pelvic muscles that could be helped by pelvic floor therapy and anal biofeedback.  I will put in a pelvic therapy referral for the spring.  Some information on special recipe and bowel habits have been given to the patient.  We discussed pulpy fruits like grapefruit oranges pears and bitter lettuce is like and I have that could help loosen stool.  She does not have much of an interested in trying a pessary as she is young.  I think she does have a proper indication for cystocele repair and under anesthesia we could gauge the degree of rectocele better in order to offer a repair.  Before we do so I would like to get an MRI of the pelvis to rule out sigmoidocele or

## 2024-12-05 ENCOUNTER — OFFICE VISIT (OUTPATIENT)
Age: 49
End: 2024-12-05
Payer: COMMERCIAL

## 2024-12-05 VITALS — OXYGEN SATURATION: 97 % | SYSTOLIC BLOOD PRESSURE: 116 MMHG | HEART RATE: 84 BPM | DIASTOLIC BLOOD PRESSURE: 75 MMHG

## 2024-12-05 DIAGNOSIS — M62.89 PELVIC FLOOR DYSFUNCTION: ICD-10-CM

## 2024-12-05 DIAGNOSIS — R14.0 BLOATING: ICD-10-CM

## 2024-12-05 DIAGNOSIS — N81.9 FEMALE GENITAL PROLAPSE, UNSPECIFIED TYPE: Primary | ICD-10-CM

## 2024-12-05 DIAGNOSIS — N39.3 SUI (STRESS URINARY INCONTINENCE), MALE: ICD-10-CM

## 2024-12-05 DIAGNOSIS — R33.9 URINARY RETENTION: ICD-10-CM

## 2024-12-05 DIAGNOSIS — N95.2 VAGINAL ATROPHY: ICD-10-CM

## 2024-12-05 DIAGNOSIS — N81.6 RECTOCELE, FEMALE: ICD-10-CM

## 2024-12-05 DIAGNOSIS — N81.10 BADEN-WALKER GRADE 1 CYSTOCELE: ICD-10-CM

## 2024-12-05 DIAGNOSIS — K59.00 CONSTIPATION, UNSPECIFIED CONSTIPATION TYPE: ICD-10-CM

## 2024-12-05 PROCEDURE — 99459 PELVIC EXAMINATION: CPT | Performed by: OBSTETRICS & GYNECOLOGY

## 2024-12-05 PROCEDURE — 76872 US TRANSRECTAL: CPT | Performed by: OBSTETRICS & GYNECOLOGY

## 2024-12-05 PROCEDURE — 91122 PR ANORECTAL MANOMETRY: CPT | Performed by: OBSTETRICS & GYNECOLOGY

## 2024-12-05 PROCEDURE — 3078F DIAST BP <80 MM HG: CPT | Performed by: OBSTETRICS & GYNECOLOGY

## 2024-12-05 PROCEDURE — 3074F SYST BP LT 130 MM HG: CPT | Performed by: OBSTETRICS & GYNECOLOGY

## 2024-12-05 PROCEDURE — 99214 OFFICE O/P EST MOD 30 MIN: CPT | Performed by: OBSTETRICS & GYNECOLOGY

## 2024-12-05 NOTE — PATIENT INSTRUCTIONS
and nausea will be available throughout your hospital stay.      It may be hard for you to urinate for a few days or weeks.  Up to 50% of women cannot urinate efficiently after surgery due to temporary bladder atony, swelling, or anesthesia. This may last a few hours to a few weeks.  Prolonged urinary retention may occur after incontinence surgery and is more likely if retention predates surgery.    You may be required to use a catheter in your bladder to help it drain. A Donald catheter, is most commonly preferred by patients and may be placed into the bladder through the urethra.  This is usually reserved for larger pelvic reconstructive surgeries and delayed recovery.  Intermittent self-catheterization (ISC) utilizes a catheter intermittently to empty their bladder. It is an easy technique to learn and helps to lessen the threat of urine infection.  Suprapubic catheterization (SPC) is a third kind of catheter.  This catheter is placed through a small incision in the abdomen.  A SPC is good for elderly patients who may not be able to insert a catheter or who are obese.  Other tubes may help drain fluid from your incision.                                   Donald Catheter        Suprapubic Catheter           Intermittent Self Catheterization                Most surgical procedures are performed at [facility] unless your surgical condition requires more intensive care at [facility], or insurance requires you go elsewhere (i.e., [facility]).  A surgical consent must be signed in the office prior to having your procedure scheduled.    After surgery, give yourself a chance to adjust and recover.  Some women feel fine within a month.  Many need a little extra time.  It may take several weeks to urinate normally thus, requiring help from a catheterization plan at home.   If pain and bleeding have been a problem, you may feel better and have more energy as your body heals.  Once

## 2024-12-11 ENCOUNTER — PREP FOR PROCEDURE (OUTPATIENT)
Age: 49
End: 2024-12-11

## 2024-12-11 DIAGNOSIS — N81.10 FEMALE CYSTOCELE: ICD-10-CM

## 2024-12-11 DIAGNOSIS — N81.6 RECTOCELE: ICD-10-CM

## 2024-12-12 DIAGNOSIS — Z01.818 PRE-OP TESTING: Primary | ICD-10-CM

## 2024-12-20 ENCOUNTER — HOSPITAL ENCOUNTER (OUTPATIENT)
Dept: WOMENS IMAGING | Age: 49
Discharge: HOME | End: 2024-12-22
Payer: COMMERCIAL

## 2024-12-20 DIAGNOSIS — R92.8 ABNORMAL MAMMOGRAM: ICD-10-CM

## 2024-12-20 PROCEDURE — G0279 TOMOSYNTHESIS, MAMMO: HCPCS

## 2024-12-20 PROCEDURE — 76642 ULTRASOUND BREAST LIMITED: CPT

## 2025-01-06 RX ORDER — LISINOPRIL AND HYDROCHLOROTHIAZIDE 12.5; 2 MG/1; MG/1
TABLET ORAL
Qty: 90 TABLET | Refills: 3 | Status: SHIPPED | OUTPATIENT
Start: 2025-01-06

## 2025-01-07 ENCOUNTER — TELEPHONE (OUTPATIENT)
Age: 50
End: 2025-01-07

## 2025-01-07 DIAGNOSIS — R19.8 ABNORMAL DEFECATION: ICD-10-CM

## 2025-01-07 DIAGNOSIS — M62.89 PELVIC FLOOR DYSFUNCTION: ICD-10-CM

## 2025-01-07 DIAGNOSIS — N81.6 RECTOCELE: Primary | ICD-10-CM

## 2025-01-07 NOTE — TELEPHONE ENCOUNTER
Pt sched for surg 1/20/25 - a/p repairs - she would like to postpone for the short term and trial PFT first.  She has these appts scheduled.  She also just started a new job this fall and will call back when more conducive for surgery based on her work schedule and necessary time off.    Additionally, she attempted to get the MRI pelvis completed and had a difficult time getting scheduled.  She is being told that the diagnosis is incorrect for what is needing to be looked at.  Will you verify that order is correct and patient stated she will get completed ahead of any planned surgery.      Are there specific hospitals where MRI should be done.  She recalled she inquired to Alondra Lara to get scheduled.

## 2025-01-16 ASSESSMENT — ENCOUNTER SYMPTOMS: BACK PAIN: 0

## 2025-01-17 ENCOUNTER — OFFICE VISIT (OUTPATIENT)
Dept: PRIMARY CARE CLINIC | Age: 50
End: 2025-01-17
Payer: COMMERCIAL

## 2025-01-17 VITALS
DIASTOLIC BLOOD PRESSURE: 80 MMHG | OXYGEN SATURATION: 99 % | WEIGHT: 176 LBS | HEIGHT: 64 IN | SYSTOLIC BLOOD PRESSURE: 110 MMHG | HEART RATE: 80 BPM | BODY MASS INDEX: 30.05 KG/M2

## 2025-01-17 DIAGNOSIS — R11.0 NAUSEA: ICD-10-CM

## 2025-01-17 DIAGNOSIS — I10 ESSENTIAL HYPERTENSION: ICD-10-CM

## 2025-01-17 DIAGNOSIS — E66.811 CLASS 1 OBESITY WITH SERIOUS COMORBIDITY AND BODY MASS INDEX (BMI) OF 30.0 TO 30.9 IN ADULT, UNSPECIFIED OBESITY TYPE: ICD-10-CM

## 2025-01-17 DIAGNOSIS — G47.33 OSA (OBSTRUCTIVE SLEEP APNEA): Primary | ICD-10-CM

## 2025-01-17 DIAGNOSIS — E78.5 DYSLIPIDEMIA: Primary | ICD-10-CM

## 2025-01-17 LAB
ALBUMIN/GLOBULIN RATIO: 1.8 (ref 1–2.5)
ALBUMIN: 4.5 G/DL (ref 3.5–5.2)
ALP BLD-CCNC: 72 U/L (ref 35–104)
ALT SERPL-CCNC: 18 U/L (ref 10–35)
ANION GAP SERPL CALCULATED.3IONS-SCNC: 8 MMOL/L (ref 9–16)
AST SERPL-CCNC: 18 U/L (ref 10–35)
BILIRUB SERPL-MCNC: 0.4 MG/DL (ref 0–1.2)
BUN BLDV-MCNC: 18 MG/DL (ref 6–20)
CALCIUM SERPL-MCNC: 9 MG/DL (ref 8.6–10.4)
CHLORIDE BLD-SCNC: 104 MMOL/L (ref 98–107)
CHOLESTEROL, TOTAL: 266 MG/DL (ref 0–199)
CHOLESTEROL/HDL RATIO: 5.3
CO2: 26 MMOL/L (ref 20–31)
CREAT SERPL-MCNC: 0.8 MG/DL (ref 0.6–0.9)
ESTIMATED AVERAGE GLUCOSE: 94 MG/DL
GFR, ESTIMATED: >90 ML/MIN/1.73M2
GLUCOSE FASTING: 92 MG/DL (ref 74–99)
HBA1C MFR BLD: 4.9 % (ref 4–6)
HDLC SERPL-MCNC: 50 MG/DL
LDL CHOLESTEROL: 191 MG/DL (ref 0–100)
POTASSIUM SERPL-SCNC: 4.7 MMOL/L (ref 3.7–5.3)
SODIUM BLD-SCNC: 138 MMOL/L (ref 136–145)
TOTAL PROTEIN: 7 G/DL (ref 6.6–8.7)
TRIGL SERPL-MCNC: 126 MG/DL
VLDLC SERPL CALC-MCNC: 25 MG/DL (ref 1–30)

## 2025-01-17 PROCEDURE — 99214 OFFICE O/P EST MOD 30 MIN: CPT | Performed by: PHYSICIAN ASSISTANT

## 2025-01-17 PROCEDURE — G8427 DOCREV CUR MEDS BY ELIG CLIN: HCPCS | Performed by: PHYSICIAN ASSISTANT

## 2025-01-17 PROCEDURE — 1036F TOBACCO NON-USER: CPT | Performed by: PHYSICIAN ASSISTANT

## 2025-01-17 PROCEDURE — G8417 CALC BMI ABV UP PARAM F/U: HCPCS | Performed by: PHYSICIAN ASSISTANT

## 2025-01-17 PROCEDURE — 3079F DIAST BP 80-89 MM HG: CPT | Performed by: PHYSICIAN ASSISTANT

## 2025-01-17 PROCEDURE — 3074F SYST BP LT 130 MM HG: CPT | Performed by: PHYSICIAN ASSISTANT

## 2025-01-17 RX ORDER — LISINOPRIL AND HYDROCHLOROTHIAZIDE 12.5; 2 MG/1; MG/1
TABLET ORAL
Qty: 90 TABLET | Refills: 3 | Status: CANCELLED | OUTPATIENT
Start: 2025-01-17

## 2025-01-17 RX ORDER — ONDANSETRON 4 MG/1
4 TABLET, FILM COATED ORAL DAILY PRN
Qty: 30 TABLET | Refills: 1 | Status: SHIPPED | OUTPATIENT
Start: 2025-01-17

## 2025-01-17 SDOH — ECONOMIC STABILITY: INCOME INSECURITY: IN THE LAST 12 MONTHS, WAS THERE A TIME WHEN YOU WERE NOT ABLE TO PAY THE MORTGAGE OR RENT ON TIME?: NO

## 2025-01-17 SDOH — ECONOMIC STABILITY: FOOD INSECURITY: WITHIN THE PAST 12 MONTHS, THE FOOD YOU BOUGHT JUST DIDN'T LAST AND YOU DIDN'T HAVE MONEY TO GET MORE.: NEVER TRUE

## 2025-01-17 SDOH — ECONOMIC STABILITY: TRANSPORTATION INSECURITY
IN THE PAST 12 MONTHS, HAS LACK OF TRANSPORTATION KEPT YOU FROM MEETINGS, WORK, OR FROM GETTING THINGS NEEDED FOR DAILY LIVING?: NO

## 2025-01-17 SDOH — ECONOMIC STABILITY: FOOD INSECURITY: WITHIN THE PAST 12 MONTHS, YOU WORRIED THAT YOUR FOOD WOULD RUN OUT BEFORE YOU GOT MONEY TO BUY MORE.: NEVER TRUE

## 2025-01-17 ASSESSMENT — PATIENT HEALTH QUESTIONNAIRE - PHQ9
SUM OF ALL RESPONSES TO PHQ QUESTIONS 1-9: 0
1. LITTLE INTEREST OR PLEASURE IN DOING THINGS: NOT AT ALL
8. MOVING OR SPEAKING SO SLOWLY THAT OTHER PEOPLE COULD HAVE NOTICED. OR THE OPPOSITE, BEING SO FIGETY OR RESTLESS THAT YOU HAVE BEEN MOVING AROUND A LOT MORE THAN USUAL: NOT AT ALL
9. THOUGHTS THAT YOU WOULD BE BETTER OFF DEAD, OR OF HURTING YOURSELF: NOT AT ALL
3. TROUBLE FALLING OR STAYING ASLEEP: NOT AT ALL
2. FEELING DOWN, DEPRESSED OR HOPELESS: NOT AT ALL
SUM OF ALL RESPONSES TO PHQ9 QUESTIONS 1 & 2: 0
1. LITTLE INTEREST OR PLEASURE IN DOING THINGS: NOT AT ALL
SUM OF ALL RESPONSES TO PHQ QUESTIONS 1-9: 0
10. IF YOU CHECKED OFF ANY PROBLEMS, HOW DIFFICULT HAVE THESE PROBLEMS MADE IT FOR YOU TO DO YOUR WORK, TAKE CARE OF THINGS AT HOME, OR GET ALONG WITH OTHER PEOPLE: NOT DIFFICULT AT ALL
6. FEELING BAD ABOUT YOURSELF - OR THAT YOU ARE A FAILURE OR HAVE LET YOURSELF OR YOUR FAMILY DOWN: NOT AT ALL
5. POOR APPETITE OR OVEREATING: NOT AT ALL
SUM OF ALL RESPONSES TO PHQ QUESTIONS 1-9: 0
10. IF YOU CHECKED OFF ANY PROBLEMS, HOW DIFFICULT HAVE THESE PROBLEMS MADE IT FOR YOU TO DO YOUR WORK, TAKE CARE OF THINGS AT HOME, OR GET ALONG WITH OTHER PEOPLE: NOT DIFFICULT AT ALL
4. FEELING TIRED OR HAVING LITTLE ENERGY: NOT AT ALL
7. TROUBLE CONCENTRATING ON THINGS, SUCH AS READING THE NEWSPAPER OR WATCHING TELEVISION: NOT AT ALL
2. FEELING DOWN, DEPRESSED OR HOPELESS: NOT AT ALL
SUM OF ALL RESPONSES TO PHQ QUESTIONS 1-9: 0
6. FEELING BAD ABOUT YOURSELF - OR THAT YOU ARE A FAILURE OR HAVE LET YOURSELF OR YOUR FAMILY DOWN: NOT AT ALL
5. POOR APPETITE OR OVEREATING: NOT AT ALL
4. FEELING TIRED OR HAVING LITTLE ENERGY: NOT AT ALL
3. TROUBLE FALLING OR STAYING ASLEEP: NOT AT ALL
8. MOVING OR SPEAKING SO SLOWLY THAT OTHER PEOPLE COULD HAVE NOTICED. OR THE OPPOSITE - BEING SO FIDGETY OR RESTLESS THAT YOU HAVE BEEN MOVING AROUND A LOT MORE THAN USUAL: NOT AT ALL
9. THOUGHTS THAT YOU WOULD BE BETTER OFF DEAD, OR OF HURTING YOURSELF: NOT AT ALL
SUM OF ALL RESPONSES TO PHQ QUESTIONS 1-9: 0
7. TROUBLE CONCENTRATING ON THINGS, SUCH AS READING THE NEWSPAPER OR WATCHING TELEVISION: NOT AT ALL

## 2025-01-17 NOTE — PROGRESS NOTES
[facility]  [facility]  [address]  [place] [address]  Dept: [phone]    Charlee Hernandez is a 49 y.o. female who presents today for her medical conditions/complaints as noted below.      Chief Complaint   Patient presents with    Weight Management     Patient is here to discuss getting tirzepatide through her insurance - patient is taking tirzepatide 5mg and will increase to 7.5mg in about x3 weeks - patient states medication x3 weeks ago     Sleep Apnea     Patient is diagnosed sleep apea and uses CPAP nightly   Declined flu vaccine   Patient had colonoscopy in  at [facility] Dr. Sunshine SILVA:     HPI  Has been on semaglutide thru [facility] for months and lost 20-25 pounds .  Has been stuck swith no weight loss siri August and it was suggested to switch to Zepbound. . Was switched to tirzepatide and is on 5mg at this time.     She would like to look into using Zepbound with her diagnosis of SUDEEP  No components found for: \"LDLCHOLESTEROL\", \"LDLCALC\"    (goal LDL is <100)   AST (U/L)   Date Value   2023 16     ALT (U/L)   Date Value   2023 17     BUN (mg/dL)   Date Value   07/15/2024 19     BP Readings from Last 3 Encounters:   25 110/80   24 116/75   24 122/81          (goal 120/80)    Past Medical History:   Diagnosis Date    Cazares's esophagus     Constipation     Digestive problems     Dyspareunia in female     Gastritis     Headache 2016    Hepatic steatosis     Herpes labialis     Hiatal hernia     History of UTI     Hyperlipidemia     Hypertension     Leaking of urine     Migraine     Pelvic pain     Sleep apnea     Obstructive slerp apnea    Uterine leiomyoma     Vaginal discharge     Vulvar itching       Past Surgical History:   Procedure Laterality Date     SECTION  2006     SECTION  2007    ESOPHAGOGASTRODUODENOSCOPY  2023    EGD BIOPSY    HYSTERECTOMY (CERVIX STATUS UNKNOWN)  2018

## 2025-01-20 RX ORDER — ATORVASTATIN CALCIUM 20 MG/1
20 TABLET, FILM COATED ORAL DAILY
Qty: 90 TABLET | Refills: 3 | Status: SHIPPED | OUTPATIENT
Start: 2025-01-20 | End: 2025-01-23 | Stop reason: ALTCHOICE

## 2025-01-23 ENCOUNTER — TELEPHONE (OUTPATIENT)
Dept: PRIMARY CARE CLINIC | Age: 50
End: 2025-01-23

## 2025-01-23 ENCOUNTER — PATIENT MESSAGE (OUTPATIENT)
Dept: PRIMARY CARE CLINIC | Age: 50
End: 2025-01-23

## 2025-01-23 DIAGNOSIS — E78.5 DYSLIPIDEMIA: Primary | ICD-10-CM

## 2025-01-23 RX ORDER — PRAVASTATIN SODIUM 20 MG
20 TABLET ORAL DAILY
Qty: 90 TABLET | Refills: 1 | Status: SHIPPED | OUTPATIENT
Start: 2025-01-23

## 2025-01-23 NOTE — TELEPHONE ENCOUNTER
PA denied. No alternative formularies on plan.    \"The requested medications are not a covered benefit and excluded from coverage in accordance with the terms and conditions of your plan benefit. Therefore, the request has been administratively denied.\"

## 2025-02-03 ENCOUNTER — OFFICE VISIT (OUTPATIENT)
Dept: FAMILY MEDICINE CLINIC | Age: 50
End: 2025-02-03
Payer: COMMERCIAL

## 2025-02-03 VITALS
TEMPERATURE: 98.1 F | DIASTOLIC BLOOD PRESSURE: 77 MMHG | SYSTOLIC BLOOD PRESSURE: 120 MMHG | HEART RATE: 93 BPM | OXYGEN SATURATION: 98 %

## 2025-02-03 DIAGNOSIS — H66.93 BILATERAL ACUTE OTITIS MEDIA: Primary | ICD-10-CM

## 2025-02-03 PROCEDURE — G8417 CALC BMI ABV UP PARAM F/U: HCPCS

## 2025-02-03 PROCEDURE — 3017F COLORECTAL CA SCREEN DOC REV: CPT

## 2025-02-03 PROCEDURE — 3078F DIAST BP <80 MM HG: CPT

## 2025-02-03 PROCEDURE — 99213 OFFICE O/P EST LOW 20 MIN: CPT

## 2025-02-03 PROCEDURE — 1036F TOBACCO NON-USER: CPT

## 2025-02-03 PROCEDURE — 3074F SYST BP LT 130 MM HG: CPT

## 2025-02-03 PROCEDURE — G8427 DOCREV CUR MEDS BY ELIG CLIN: HCPCS

## 2025-02-03 ASSESSMENT — ENCOUNTER SYMPTOMS
VOMITING: 0
COUGH: 0
DIARRHEA: 0
EYE REDNESS: 0
ABDOMINAL PAIN: 0
RHINORRHEA: 0
EYE PAIN: 0
SORE THROAT: 1
EYE ITCHING: 0

## 2025-02-03 NOTE — PROGRESS NOTES
ill-appearing, toxic-appearing or diaphoretic.   HENT:      Head: Normocephalic and atraumatic. No right periorbital erythema or left periorbital erythema.      Right Ear: Ear canal and external ear normal. Tenderness present. No laceration, drainage or swelling. A middle ear effusion is present. There is no impacted cerumen. No foreign body. Tympanic membrane is bulging. Tympanic membrane is not injected, perforated or erythematous.      Left Ear: Ear canal and external ear normal. Tenderness present. No laceration, drainage or swelling. A middle ear effusion is present. There is no impacted cerumen. No foreign body. Tympanic membrane is injected and bulging. Tympanic membrane is not perforated or erythematous.      Nose: Congestion present. No nasal tenderness or rhinorrhea.      Right Sinus: No maxillary sinus tenderness or frontal sinus tenderness.      Left Sinus: No maxillary sinus tenderness or frontal sinus tenderness.      Mouth/Throat:      Lips: Pink.      Mouth: Mucous membranes are moist.      Pharynx: Posterior oropharyngeal erythema and postnasal drip present. No pharyngeal swelling or oropharyngeal exudate.   Eyes:      General:         Right eye: No discharge.         Left eye: No discharge.      Extraocular Movements: Extraocular movements intact.      Conjunctiva/sclera: Conjunctivae normal.      Pupils: Pupils are equal, round, and reactive to light.   Cardiovascular:      Rate and Rhythm: Normal rate.   Pulmonary:      Effort: Pulmonary effort is normal.   Musculoskeletal:         General: No signs of injury.      Cervical back: Neck supple. No rigidity or tenderness.      Right lower leg: No edema.      Left lower leg: No edema.   Lymphadenopathy:      Cervical: No cervical adenopathy.   Skin:     Findings: No erythema or rash.   Neurological:      Mental Status: She is alert and oriented to person, place, and time.      Motor: No weakness.      Coordination: Coordination normal.      Gait:

## 2025-02-19 NOTE — PROGRESS NOTES
[facility]  [address]  [place] [address]  Phone: [phone]  Fax: [phone]    Charlee Hernandez is a 50 y.o. female who presents today for her medical conditions/complaintsas noted below.  Chief Complaint   Patient presents with    Ear Pain     Patient is here due to left ear pain - patient states when to urgent care on 02/03/25 and finished antibiotics . Patient is still feeling pressure and can hear her heart beat in her ear - taking Claritin      Ear Fullness        HPI:     HPI  Ear pain-full, no pain   Went to [facility] 2/3/25 and was treated with Augmentin  NO further pain   No fever  Has some nasal congestion in the left  RE    Current Outpatient Medications   Medication Sig Dispense Refill    loratadine (CLARITIN) 10 MG tablet Take by mouth      naratriptan (AMERGE) 2.5 MG tablet TAKE 1 TABLET BY MOUTH AT ONSET OF MIGRAINE ONCE AS NEEDED AND MAY REPEAT IN 4 HOURS IF NEEDED 9 tablet 3    pravastatin (PRAVACHOL) 20 MG tablet Take 1 tablet by mouth daily 90 tablet 1    ondansetron (ZOFRAN) 4 MG tablet Take 1 tablet by mouth daily as needed for Nausea or Vomiting 30 tablet 1    tirzepatide-weight management (ZEPBOUND) 7.5 MG/0.5ML SOAJ subCUTAneous auto-injector pen Inject 7.5 mg into the skin every 7 days 2 mL 1    lisinopril-hydroCHLOROthiazide (PRINZIDE;ZESTORETIC) 20-12.5 MG per tablet TAKE 1 TABLET DAILY 90 tablet 3    omeprazole (PRILOSEC) 20 MG delayed release capsule Take 1 capsule by mouth Daily 90 capsule 3    polyethylene glycol (GLYCOLAX) 17 GM/SCOOP powder nightly      Fremanezumab-vfrm (AJOVY) 225 MG/1.5ML SOSY Inject 225 mg into the skin every 30 days 1 each 5    metoprolol succinate (TOPROL XL) 25 MG extended release tablet TAKE 1 TABLET BY MOUTH EVERY DAY 30 tablet 1    Multiple Vitamin (MULTI-DAY) TABS Take 1 tablet by mouth daily       No current facility-administered medications for this visit.     Allergies   Allergen Reactions    Bupropion

## 2025-02-20 ENCOUNTER — OFFICE VISIT (OUTPATIENT)
Dept: PRIMARY CARE CLINIC | Age: 50
End: 2025-02-20
Payer: COMMERCIAL

## 2025-02-20 VITALS
OXYGEN SATURATION: 99 % | SYSTOLIC BLOOD PRESSURE: 122 MMHG | WEIGHT: 173 LBS | BODY MASS INDEX: 29.53 KG/M2 | HEART RATE: 81 BPM | DIASTOLIC BLOOD PRESSURE: 80 MMHG | TEMPERATURE: 98.1 F | HEIGHT: 64 IN

## 2025-02-20 DIAGNOSIS — H65.93 MIDDLE EAR EFFUSION, BILATERAL: Primary | ICD-10-CM

## 2025-02-20 DIAGNOSIS — K21.00 GASTROESOPHAGEAL REFLUX DISEASE WITH ESOPHAGITIS WITHOUT HEMORRHAGE: ICD-10-CM

## 2025-02-20 DIAGNOSIS — G43.829 MENSTRUAL MIGRAINE WITHOUT STATUS MIGRAINOSUS, NOT INTRACTABLE: ICD-10-CM

## 2025-02-20 PROCEDURE — 3079F DIAST BP 80-89 MM HG: CPT | Performed by: PHYSICIAN ASSISTANT

## 2025-02-20 PROCEDURE — 1036F TOBACCO NON-USER: CPT | Performed by: PHYSICIAN ASSISTANT

## 2025-02-20 PROCEDURE — G8417 CALC BMI ABV UP PARAM F/U: HCPCS | Performed by: PHYSICIAN ASSISTANT

## 2025-02-20 PROCEDURE — 3074F SYST BP LT 130 MM HG: CPT | Performed by: PHYSICIAN ASSISTANT

## 2025-02-20 PROCEDURE — 3017F COLORECTAL CA SCREEN DOC REV: CPT | Performed by: PHYSICIAN ASSISTANT

## 2025-02-20 PROCEDURE — 99213 OFFICE O/P EST LOW 20 MIN: CPT | Performed by: PHYSICIAN ASSISTANT

## 2025-02-20 PROCEDURE — G8427 DOCREV CUR MEDS BY ELIG CLIN: HCPCS | Performed by: PHYSICIAN ASSISTANT

## 2025-02-20 RX ORDER — NARATRIPTAN 2.5 MG/1
TABLET ORAL
Qty: 9 TABLET | Refills: 3 | Status: SHIPPED | OUTPATIENT
Start: 2025-02-20

## 2025-02-20 RX ORDER — LORATADINE 10 MG/1
TABLET ORAL
COMMUNITY
Start: 2024-03-30

## 2025-02-20 ASSESSMENT — ENCOUNTER SYMPTOMS
RHINORRHEA: 0
COUGH: 0
SORE THROAT: 0

## 2025-02-21 ENCOUNTER — HOSPITAL ENCOUNTER (EMERGENCY)
Age: 50
Discharge: HOME | End: 2025-02-21
Attending: EMERGENCY MEDICINE
Payer: COMMERCIAL

## 2025-02-21 ENCOUNTER — APPOINTMENT (OUTPATIENT)
Dept: GENERAL RADIOLOGY | Age: 50
End: 2025-02-21
Payer: COMMERCIAL

## 2025-02-21 ENCOUNTER — APPOINTMENT (OUTPATIENT)
Dept: CT IMAGING | Age: 50
End: 2025-02-21
Payer: COMMERCIAL

## 2025-02-21 VITALS
SYSTOLIC BLOOD PRESSURE: 131 MMHG | BODY MASS INDEX: 29.37 KG/M2 | OXYGEN SATURATION: 98 % | TEMPERATURE: 97.4 F | DIASTOLIC BLOOD PRESSURE: 74 MMHG | WEIGHT: 172 LBS | HEIGHT: 64 IN | RESPIRATION RATE: 16 BRPM | HEART RATE: 68 BPM

## 2025-02-21 DIAGNOSIS — N30.00 ACUTE CYSTITIS WITHOUT HEMATURIA: Primary | ICD-10-CM

## 2025-02-21 DIAGNOSIS — H92.02 LEFT EAR PAIN: ICD-10-CM

## 2025-02-21 LAB
ALBUMIN SERPL-MCNC: 4.3 G/DL (ref 3.5–5.2)
ALP SERPL-CCNC: 72 U/L (ref 35–104)
ALT SERPL-CCNC: 21 U/L (ref 10–35)
ANION GAP SERPL CALCULATED.3IONS-SCNC: 12 MMOL/L (ref 9–16)
AST SERPL-CCNC: 21 U/L (ref 10–35)
BACTERIA URNS QL MICRO: ABNORMAL
BASOPHILS # BLD: 0.1 K/UL (ref 0–0.2)
BASOPHILS NFR BLD: 1 % (ref 0–2)
BILIRUB SERPL-MCNC: 0.3 MG/DL (ref 0–1.2)
BILIRUB UR QL STRIP: NEGATIVE
BUN SERPL-MCNC: 14 MG/DL (ref 6–20)
CALCIUM SERPL-MCNC: 9.5 MG/DL (ref 8.6–10.4)
CASTS #/AREA URNS LPF: ABNORMAL /LPF
CHLORIDE SERPL-SCNC: 103 MMOL/L (ref 98–107)
CLARITY UR: CLEAR
CO2 SERPL-SCNC: 25 MMOL/L (ref 20–31)
COLOR UR: ABNORMAL
CREAT SERPL-MCNC: 0.8 MG/DL (ref 0.7–1.2)
EOSINOPHIL # BLD: 0.1 K/UL (ref 0–0.4)
EOSINOPHILS RELATIVE PERCENT: 1 % (ref 0–4)
EPI CELLS #/AREA URNS HPF: ABNORMAL /HPF
ERYTHROCYTE [DISTWIDTH] IN BLOOD BY AUTOMATED COUNT: 13.4 % (ref 11.5–14.9)
GFR, ESTIMATED: 90 ML/MIN/1.73M2
GLUCOSE SERPL-MCNC: 106 MG/DL (ref 74–99)
GLUCOSE UR STRIP-MCNC: NEGATIVE MG/DL
HCT VFR BLD AUTO: 43.7 % (ref 36–46)
HGB BLD-MCNC: 14.4 G/DL (ref 12–16)
HGB UR QL STRIP.AUTO: NEGATIVE
KETONES UR STRIP-MCNC: NEGATIVE MG/DL
LEUKOCYTE ESTERASE UR QL STRIP: NEGATIVE
LYMPHOCYTES NFR BLD: 2.3 K/UL (ref 1–4.8)
LYMPHOCYTES RELATIVE PERCENT: 29 % (ref 24–44)
MAGNESIUM SERPL-MCNC: 2.2 MG/DL (ref 1.6–2.6)
MCH RBC QN AUTO: 29.2 PG (ref 26–34)
MCHC RBC AUTO-ENTMCNC: 33 G/DL (ref 31–37)
MCV RBC AUTO: 88.3 FL (ref 80–100)
MONOCYTES NFR BLD: 0.4 K/UL (ref 0.1–1.3)
MONOCYTES NFR BLD: 6 % (ref 1–7)
NEUTROPHILS NFR BLD: 63 % (ref 36–66)
NEUTS SEG NFR BLD: 5 K/UL (ref 1.3–9.1)
NITRITE UR QL STRIP: POSITIVE
PH UR STRIP: 5 [PH] (ref 5–8)
PLATELET # BLD AUTO: 259 K/UL (ref 150–450)
PMV BLD AUTO: 7.8 FL (ref 6–12)
POTASSIUM SERPL-SCNC: 3.5 MMOL/L (ref 3.7–5.3)
PROT SERPL-MCNC: 7.1 G/DL (ref 6.6–8.7)
PROT UR STRIP-MCNC: NEGATIVE MG/DL
RBC # BLD AUTO: 4.95 M/UL (ref 4–5.2)
RBC #/AREA URNS HPF: ABNORMAL /HPF
SODIUM SERPL-SCNC: 140 MMOL/L (ref 136–145)
SP GR UR STRIP: 1.01 (ref 1–1.03)
TROPONIN I SERPL HS-MCNC: <6 NG/L (ref 0–14)
UROBILINOGEN UR STRIP-ACNC: NORMAL EU/DL (ref 0–1)
WBC #/AREA URNS HPF: ABNORMAL /HPF
WBC OTHER # BLD: 7.9 K/UL (ref 3.5–11)

## 2025-02-21 PROCEDURE — 70450 CT HEAD/BRAIN W/O DYE: CPT

## 2025-02-21 PROCEDURE — 93005 ELECTROCARDIOGRAM TRACING: CPT | Performed by: EMERGENCY MEDICINE

## 2025-02-21 PROCEDURE — 81001 URINALYSIS AUTO W/SCOPE: CPT

## 2025-02-21 PROCEDURE — 99285 EMERGENCY DEPT VISIT HI MDM: CPT

## 2025-02-21 PROCEDURE — 84484 ASSAY OF TROPONIN QUANT: CPT

## 2025-02-21 PROCEDURE — 85025 COMPLETE CBC W/AUTO DIFF WBC: CPT

## 2025-02-21 PROCEDURE — 80053 COMPREHEN METABOLIC PANEL: CPT

## 2025-02-21 PROCEDURE — 71045 X-RAY EXAM CHEST 1 VIEW: CPT

## 2025-02-21 PROCEDURE — 36415 COLL VENOUS BLD VENIPUNCTURE: CPT

## 2025-02-21 PROCEDURE — 6370000000 HC RX 637 (ALT 250 FOR IP): Performed by: EMERGENCY MEDICINE

## 2025-02-21 PROCEDURE — 83735 ASSAY OF MAGNESIUM: CPT

## 2025-02-21 RX ORDER — FLUCONAZOLE 150 MG/1
150 TABLET ORAL ONCE
Qty: 1 TABLET | Refills: 0 | Status: SHIPPED | OUTPATIENT
Start: 2025-02-24 | End: 2025-02-24

## 2025-02-21 RX ORDER — CEFDINIR 300 MG/1
300 CAPSULE ORAL 2 TIMES DAILY
Qty: 14 CAPSULE | Refills: 0 | Status: SHIPPED | OUTPATIENT
Start: 2025-02-21 | End: 2025-02-28

## 2025-02-21 RX ORDER — FLUCONAZOLE 150 MG/1
150 TABLET ORAL ONCE
Status: COMPLETED | OUTPATIENT
Start: 2025-02-21 | End: 2025-02-21

## 2025-02-21 RX ADMIN — FLUCONAZOLE 150 MG: 150 TABLET ORAL at 23:07

## 2025-02-21 ASSESSMENT — PAIN SCALES - GENERAL: PAINLEVEL_OUTOF10: 0

## 2025-02-21 ASSESSMENT — ENCOUNTER SYMPTOMS
SHORTNESS OF BREATH: 0
SORE THROAT: 0
SINUS PAIN: 0
COUGH: 0
RHINORRHEA: 0
VOMITING: 0
SINUS PRESSURE: 0
NAUSEA: 0
ABDOMINAL PAIN: 0

## 2025-02-21 ASSESSMENT — PAIN - FUNCTIONAL ASSESSMENT
PAIN_FUNCTIONAL_ASSESSMENT: NONE - DENIES PAIN
PAIN_FUNCTIONAL_ASSESSMENT: 0-10
PAIN_FUNCTIONAL_ASSESSMENT: ACTIVITIES ARE NOT PREVENTED

## 2025-02-22 NOTE — ED TRIAGE NOTES
Mode of arrival (squad #, walk in, police, etc) : walk in with         Chief complaint(s): dizziness        Arrival Note (brief scenario, treatment PTA, etc).: had dizziness earlier today subsided, returned about 7p tonight during dinner and has continued. Was treated for left ear infection since 2/3 saw PCP yesterday for follow up says infection better, just fluid noted and to take claritin. Denies chest pain, palpitations or SOB        C= \"Have you ever felt that you should Cut down on your drinking?\"  No  A= \"Have people Annoyed you by criticizing your drinking?\"  No  G= \"Have you ever felt bad or Guilty about your drinking?\"  No  E= \"Have you ever had a drink as an Eye-opener first thing in the morning to steady your nerves or to help a hangover?\"  No      Deferred []      Reason for deferring: N/A    *If yes to two or more: probable alcohol abuse.*

## 2025-02-22 NOTE — ED PROVIDER NOTES
EMERGENCY DEPARTMENT ENCOUNTER    Pt Name: Charlee Hernandez  MRN: [id number]  Birthdate 1975  Date of evaluation: 2/21/25  CHIEF COMPLAINT       Chief Complaint   Patient presents with    Dizziness     Had earlier today then came back about 7p and is now continuous     HISTORY OF PRESENT ILLNESS   Patient is presenting for lightheadedness.  Her symptoms started yesterday.  She was seen at the beginning of the month for bilateral otitis media at an urgent care and prescribed antibiotics.  She finished the 10-day course of the antibiotic and then saw her PCP yesterday.  She is still complaining of left ear pain.  She was diagnosed with a left ear effusion and was told to come back next week if she still having symptoms.  Patient has developed lightheadedness and \"fogginess \"over the last day prompting her visit.  She denies any chest pain or shortness of breath.  She denies any visual deficits.  She denies any gait instability.  She denies any sinus pain or pressure.  Patient said that it got worse tonight while she was at dinner and she felt like she was going to lose consciousness prompting her visit.    The history is provided by the patient and the spouse.           REVIEW OF SYSTEMS     Review of Systems   Constitutional:  Negative for chills and fever.   HENT:  Positive for ear pain (and pressure). Negative for congestion, rhinorrhea, sinus pressure, sinus pain and sore throat.    Eyes:  Negative for visual disturbance.   Respiratory:  Negative for cough and shortness of breath.    Cardiovascular:  Negative for chest pain.   Gastrointestinal:  Negative for abdominal pain, nausea and vomiting.   Genitourinary:  Positive for dysuria, frequency and urgency.   Neurological:  Positive for dizziness and light-headedness. Negative for tremors, seizures, syncope, facial asymmetry, speech difficulty, weakness, numbness and headaches.     PASTMEDICAL HISTORY     Past Medical History:   Diagnosis Date    Cazares's esophagus  SECTION  2007    ESOPHAGOGASTRODUODENOSCOPY  03/21/2023    EGD BIOPSY    HYSTERECTOMY (CERVIX STATUS UNKNOWN)  03/2018    With Removal of Submucous Leiomyomata    LAPAROSCOPY      Excis Endometriotic Tissue Uterosacral Ligaments    UPPER GASTROINTESTINAL ENDOSCOPY N/A 03/21/2023    EGD BIOPSY performed by Jovan Oneill MD at [facility] OR    UPPER GASTROINTESTINAL ENDOSCOPY N/A 04/25/2024    ESOPHAGOGASTRODUODENOSCOPY BIOPSY performed by Bryan Gonsalez MD at [facility] OR     CURRENT MEDICATIONS       Discharge Medication List as of 2/21/2025 11:05 PM        CONTINUE these medications which have NOT CHANGED    Details   loratadine (CLARITIN) 10 MG tablet Take by mouthHistorical Med      naratriptan (AMERGE) 2.5 MG tablet TAKE 1 TABLET BY MOUTH AT ONSET OF MIGRAINE ONCE AS NEEDED AND MAY REPEAT IN 4 HOURS IF NEEDED, Disp-9 tablet, R-3Normal      pravastatin (PRAVACHOL) 20 MG tablet Take 1 tablet by mouth daily, Disp-90 tablet, R-1Normal      ondansetron (ZOFRAN) 4 MG tablet Take 1 tablet by mouth daily as needed for Nausea or Vomiting, Disp-30 tablet, R-1Normal      tirzepatide-weight management (ZEPBOUND) 7.5 MG/0.5ML SOAJ subCUTAneous auto-injector pen Inject 7.5 mg into the skin every 7 days, Disp-2 mL, R-1Normal      lisinopril-hydroCHLOROthiazide (PRINZIDE;ZESTORETIC) 20-12.5 MG per tablet TAKE 1 TABLET DAILY, Disp-90 tablet, R-3Normal      omeprazole (PRILOSEC) 20 MG delayed release capsule Take 1 capsule by mouth Daily, Disp-90 capsule, R-3Normal      polyethylene glycol (GLYCOLAX) 17 GM/SCOOP powder nightlyHistorical Med      Fremanezumab-vfrm (AJOVY) 225 MG/1.5ML SOSY Inject 225 mg into the skin every 30 days, Disp-1 each, R-5Normal      metoprolol succinate (TOPROL XL) 25 MG extended release tablet TAKE 1 TABLET BY MOUTH EVERY DAY, Disp-30 tablet, R-1Normal      Multiple Vitamin (MULTI-DAY) TABS Take 1 tablet by mouth daily           ALLERGIES     is allergic to bupropion.  FAMILY HISTORY     She indicated

## 2025-02-24 LAB
EKG ATRIAL RATE: 67 BPM
EKG P AXIS: 14 DEGREES
EKG P-R INTERVAL: 158 MS
EKG Q-T INTERVAL: 390 MS
EKG QRS DURATION: 76 MS
EKG QTC CALCULATION (BAZETT): 412 MS
EKG R AXIS: -3 DEGREES
EKG T AXIS: 30 DEGREES
EKG VENTRICULAR RATE: 67 BPM

## 2025-02-24 PROCEDURE — 93010 ELECTROCARDIOGRAM REPORT: CPT | Performed by: INTERNAL MEDICINE

## 2025-02-26 ENCOUNTER — PATIENT MESSAGE (OUTPATIENT)
Dept: PRIMARY CARE CLINIC | Age: 50
End: 2025-02-26

## 2025-02-26 DIAGNOSIS — H65.90 FLUID LEVEL BEHIND TYMPANIC MEMBRANE, UNSPECIFIED LATERALITY: ICD-10-CM

## 2025-02-26 DIAGNOSIS — R42 DIZZINESS: ICD-10-CM

## 2025-02-26 DIAGNOSIS — H92.09 OTALGIA, UNSPECIFIED LATERALITY: Primary | ICD-10-CM

## 2025-02-27 ENCOUNTER — TELEPHONE (OUTPATIENT)
Dept: ADMINISTRATIVE | Age: 50
End: 2025-02-27

## 2025-02-27 NOTE — TELEPHONE ENCOUNTER
Spoke with patient to schedule NP appt. Appt scheduled 5/22 at 8:20. Writer reinforced patient needs to bring her outside hearing evaluation with her to the appt in able to be seen. Patient voiced understanding. No additional needs.

## 2025-02-27 NOTE — TELEPHONE ENCOUNTER
New adult patient referral received for DX of   Otalgia, unspecified laterality  Dizziness  Fluid level behind tympanic membrane, unspecified laterality    Please review and call to Schedule

## 2025-03-12 RX ORDER — LISINOPRIL AND HYDROCHLOROTHIAZIDE 12.5; 2 MG/1; MG/1
TABLET ORAL
Qty: 90 TABLET | Refills: 0 | Status: SHIPPED | OUTPATIENT
Start: 2025-03-12

## 2025-04-07 ENCOUNTER — TELEPHONE (OUTPATIENT)
Dept: GASTROENTEROLOGY | Age: 50
End: 2025-04-07

## 2025-04-07 NOTE — TELEPHONE ENCOUNTER
Writer called pt and LVM canc appt on 5/21/2025 due to provider being on call, and to please call the office to perico appt.

## 2025-05-05 ENCOUNTER — PATIENT MESSAGE (OUTPATIENT)
Dept: PRIMARY CARE CLINIC | Age: 50
End: 2025-05-05

## 2025-05-08 NOTE — TELEPHONE ENCOUNTER
Please start the process for approval for GLP1 for Kesha.  You may want to refer to these messages and her 's PA on Ozempic.

## 2025-05-08 NOTE — TELEPHONE ENCOUNTER
Patients  did not require a prior authorization. It does not appear as though the pharmacy is requesting one for Charlee either.     Epic autoclosed PA due to not being required. Patient should be able to pick script up without issue.

## 2025-06-09 RX ORDER — LISINOPRIL AND HYDROCHLOROTHIAZIDE 12.5; 2 MG/1; MG/1
1 TABLET ORAL DAILY
Qty: 90 TABLET | Refills: 1 | Status: SHIPPED | OUTPATIENT
Start: 2025-06-09

## 2025-06-09 NOTE — PROGRESS NOTES
[facility]  [address]  [place] [address]  Phone: [phone]  Fax: [phone]    Charlee Hernandez is a 50 y.o. female who presents today for her medical conditions/complaintsas noted below.  Chief Complaint   Patient presents with    Knee Pain     Patient has been having pain and popping on her left knee. She did go to the emergency room on 5/22/25 and has x-rays done. Patient is wondering if she injured it; She notes when this started twisted her knee wrong.        HPI:     HPI  Injured left knee on 5/22/25 with a twisting motion and she heard a loud pop and was diagnosed with infrapatellar tendonitis . X-ray was normal.     Has been treating conservatively  but she keeps reinjuring and it is unstable   Current Outpatient Medications   Medication Sig Dispense Refill    ibuprofen (ADVIL;MOTRIN) 800 MG tablet Take 1 tablet by mouth 3 times daily      HYDROcodone-acetaminophen (NORCO) 5-325 MG per tablet Take 1 tablet by mouth every 4 hours as needed for Pain for up to 3 days. Intended supply: 3 days. Take lowest dose possible to manage pain Max Daily Amount: 6 tablets 18 tablet 0    lisinopril-hydroCHLOROthiazide (PRINZIDE;ZESTORETIC) 20-12.5 MG per tablet TAKE 1 TABLET BY MOUTH EVERY DAY 90 tablet 1    semaglutide, 2 MG/DOSE, (OZEMPIC) 8 MG/3ML SOPN sc injection Inject 2 mg into the skin every 7 days 3 mL 1    loratadine (CLARITIN) 10 MG tablet Take by mouth      naratriptan (AMERGE) 2.5 MG tablet TAKE 1 TABLET BY MOUTH AT ONSET OF MIGRAINE ONCE AS NEEDED AND MAY REPEAT IN 4 HOURS IF NEEDED 9 tablet 3    pravastatin (PRAVACHOL) 20 MG tablet Take 1 tablet by mouth daily 90 tablet 1    ondansetron (ZOFRAN) 4 MG tablet Take 1 tablet by mouth daily as needed for Nausea or Vomiting 30 tablet 1    omeprazole (PRILOSEC) 20 MG delayed release capsule Take 1 capsule by mouth Daily 90 capsule 3    polyethylene glycol (GLYCOLAX) 17 GM/SCOOP powder nightly      Fremanezumab-[facility]

## 2025-06-10 ENCOUNTER — OFFICE VISIT (OUTPATIENT)
Dept: PRIMARY CARE CLINIC | Age: 50
End: 2025-06-10
Payer: COMMERCIAL

## 2025-06-10 VITALS
BODY MASS INDEX: 27.72 KG/M2 | OXYGEN SATURATION: 99 % | HEART RATE: 86 BPM | SYSTOLIC BLOOD PRESSURE: 120 MMHG | DIASTOLIC BLOOD PRESSURE: 86 MMHG | HEIGHT: 64 IN | WEIGHT: 162.4 LBS

## 2025-06-10 DIAGNOSIS — S89.92XD INJURY OF LEFT KNEE, SUBSEQUENT ENCOUNTER: Primary | ICD-10-CM

## 2025-06-10 DIAGNOSIS — M23.52 RECURRENT LEFT KNEE INSTABILITY: ICD-10-CM

## 2025-06-10 PROCEDURE — 99214 OFFICE O/P EST MOD 30 MIN: CPT | Performed by: PHYSICIAN ASSISTANT

## 2025-06-10 PROCEDURE — 3074F SYST BP LT 130 MM HG: CPT | Performed by: PHYSICIAN ASSISTANT

## 2025-06-10 PROCEDURE — G8427 DOCREV CUR MEDS BY ELIG CLIN: HCPCS | Performed by: PHYSICIAN ASSISTANT

## 2025-06-10 PROCEDURE — G8417 CALC BMI ABV UP PARAM F/U: HCPCS | Performed by: PHYSICIAN ASSISTANT

## 2025-06-10 PROCEDURE — 1036F TOBACCO NON-USER: CPT | Performed by: PHYSICIAN ASSISTANT

## 2025-06-10 PROCEDURE — 3017F COLORECTAL CA SCREEN DOC REV: CPT | Performed by: PHYSICIAN ASSISTANT

## 2025-06-10 PROCEDURE — 3079F DIAST BP 80-89 MM HG: CPT | Performed by: PHYSICIAN ASSISTANT

## 2025-06-10 RX ORDER — IBUPROFEN 800 MG/1
800 TABLET, FILM COATED ORAL 3 TIMES DAILY
COMMUNITY
Start: 2025-05-22

## 2025-06-10 RX ORDER — HYDROCODONE BITARTRATE AND ACETAMINOPHEN 5; 325 MG/1; MG/1
1 TABLET ORAL EVERY 4 HOURS PRN
Qty: 18 TABLET | Refills: 0 | Status: SHIPPED | OUTPATIENT
Start: 2025-06-10 | End: 2025-06-13

## 2025-06-10 ASSESSMENT — PATIENT HEALTH QUESTIONNAIRE - PHQ9
SUM OF ALL RESPONSES TO PHQ QUESTIONS 1-9: 0
1. LITTLE INTEREST OR PLEASURE IN DOING THINGS: NOT AT ALL
SUM OF ALL RESPONSES TO PHQ QUESTIONS 1-9: 0
SUM OF ALL RESPONSES TO PHQ QUESTIONS 1-9: 0
2. FEELING DOWN, DEPRESSED OR HOPELESS: NOT AT ALL
SUM OF ALL RESPONSES TO PHQ QUESTIONS 1-9: 0

## 2025-06-18 ENCOUNTER — HOSPITAL ENCOUNTER (OUTPATIENT)
Dept: MRI IMAGING | Facility: CLINIC | Age: 50
Discharge: HOME | End: 2025-06-20
Payer: COMMERCIAL

## 2025-06-18 DIAGNOSIS — M23.52 RECURRENT LEFT KNEE INSTABILITY: ICD-10-CM

## 2025-06-18 DIAGNOSIS — S89.92XD INJURY OF LEFT KNEE, SUBSEQUENT ENCOUNTER: ICD-10-CM

## 2025-06-18 PROCEDURE — 73721 MRI JNT OF LWR EXTRE W/O DYE: CPT

## 2025-06-23 ENCOUNTER — RESULTS FOLLOW-UP (OUTPATIENT)
Dept: PRIMARY CARE CLINIC | Age: 50
End: 2025-06-23

## 2025-06-23 RX ORDER — LISINOPRIL AND HYDROCHLOROTHIAZIDE 12.5; 2 MG/1; MG/1
1 TABLET ORAL DAILY
Qty: 90 TABLET | Refills: 0 | OUTPATIENT
Start: 2025-06-23

## 2025-06-25 ENCOUNTER — OFFICE VISIT (OUTPATIENT)
Age: 50
End: 2025-06-25
Payer: COMMERCIAL

## 2025-06-25 DIAGNOSIS — M17.12 OSTEOARTHRITIS OF LEFT KNEE, UNSPECIFIED OSTEOARTHRITIS TYPE: Primary | ICD-10-CM

## 2025-06-25 PROCEDURE — 3017F COLORECTAL CA SCREEN DOC REV: CPT

## 2025-06-25 PROCEDURE — G8417 CALC BMI ABV UP PARAM F/U: HCPCS

## 2025-06-25 PROCEDURE — G8428 CUR MEDS NOT DOCUMENT: HCPCS

## 2025-06-25 PROCEDURE — 99204 OFFICE O/P NEW MOD 45 MIN: CPT

## 2025-06-25 PROCEDURE — 20610 DRAIN/INJ JOINT/BURSA W/O US: CPT

## 2025-06-25 PROCEDURE — 1036F TOBACCO NON-USER: CPT

## 2025-06-25 RX ADMIN — LIDOCAINE HYDROCHLORIDE 2 ML: 10 INJECTION, SOLUTION INFILTRATION; PERINEURAL at 14:30

## 2025-06-25 RX ADMIN — METHYLPREDNISOLONE ACETATE 80 MG: 80 INJECTION, SUSPENSION INTRA-ARTICULAR; INTRALESIONAL; INTRAMUSCULAR; SOFT TISSUE at 14:30

## 2025-06-25 NOTE — PROGRESS NOTES
[facility]      [facility]  [address]  [place] [address]  Dept: [phone]  Dept Fax: [phone]    Chief Compliant:  Chief Complaint   Patient presents with    Knee Pain     Left knee.        History of Present Illness:  This is a pleasant 50 y.o. female who presents to the clinic today for evaluation / follow up of left knee pain.  Patient notes that she has been doing ongoing pain in her left knee since May.  She states that she had a twisting injury when she was tried to carry some items up from her basement.  She states that she tried to use her leg to move her chair on the way and she ended up twisting her knee during this.  She did see her PCP who ended up eventually ordering MRI.  She points to the posterior lateral aspect her knee as to where most of her pain is.  She is been taking Norco for pain as well as ibuprofen.  She has been using an Ace wrap for some compression.  She states that she had issues with ambulation since this injury.  She presents today for further evaluation and treatment.      Physical Exam:    Left knee: Patient is able to fully extend her knee on exam today.  She can flex back to about 120 degrees or so.  She has really no major tenderness to palpation over the medial lateral joint lines.  She has a little bit of tenderness to palpation over the patella as well as over the more proximal aspect of the patellar tendon.  No significant tenderness over the quad tendon today.  She is overall stable varus valgus stress at 0 and 30 degrees.  She did have significant pain with Thessaly test today.  She had negative Lachman test.  Negative Emilee test today.  No palpable effusion on exam today.  Crepitus present throughout range of motion.    Nursing note and vitals reviewed.     Labs and Imaging:     3 views of the left knee taken

## 2025-06-26 RX ORDER — METHYLPREDNISOLONE ACETATE 80 MG/ML
80 INJECTION, SUSPENSION INTRA-ARTICULAR; INTRALESIONAL; INTRAMUSCULAR; SOFT TISSUE ONCE
Status: COMPLETED | OUTPATIENT
Start: 2025-06-26 | End: 2025-06-25

## 2025-06-26 RX ORDER — LIDOCAINE HYDROCHLORIDE 10 MG/ML
2 INJECTION, SOLUTION INFILTRATION; PERINEURAL ONCE
Status: COMPLETED | OUTPATIENT
Start: 2025-06-26 | End: 2025-06-25

## 2025-07-06 ENCOUNTER — PATIENT MESSAGE (OUTPATIENT)
Dept: PRIMARY CARE CLINIC | Age: 50
End: 2025-07-06

## 2025-07-06 DIAGNOSIS — R92.8 FOLLOW-UP EXAMINATION OF ABNORMAL MAMMOGRAM: ICD-10-CM

## 2025-07-06 DIAGNOSIS — R92.8 ABNORMAL MAMMOGRAM OF RIGHT BREAST: Primary | ICD-10-CM

## 2025-07-09 ENCOUNTER — HOSPITAL ENCOUNTER (EMERGENCY)
Age: 50
Discharge: HOME | End: 2025-07-09
Attending: EMERGENCY MEDICINE
Payer: COMMERCIAL

## 2025-07-09 ENCOUNTER — APPOINTMENT (OUTPATIENT)
Dept: GENERAL RADIOLOGY | Age: 50
End: 2025-07-09
Payer: COMMERCIAL

## 2025-07-09 ENCOUNTER — APPOINTMENT (OUTPATIENT)
Dept: CT IMAGING | Age: 50
End: 2025-07-09
Payer: COMMERCIAL

## 2025-07-09 VITALS
HEART RATE: 76 BPM | TEMPERATURE: 98.4 F | OXYGEN SATURATION: 98 % | BODY MASS INDEX: 26.12 KG/M2 | HEIGHT: 64 IN | WEIGHT: 153 LBS | SYSTOLIC BLOOD PRESSURE: 112 MMHG | RESPIRATION RATE: 11 BRPM | DIASTOLIC BLOOD PRESSURE: 65 MMHG

## 2025-07-09 DIAGNOSIS — M54.6 LEFT-SIDED THORACIC BACK PAIN, UNSPECIFIED CHRONICITY: Primary | ICD-10-CM

## 2025-07-09 DIAGNOSIS — R07.9 CHEST PAIN, UNSPECIFIED TYPE: ICD-10-CM

## 2025-07-09 LAB
ALBUMIN SERPL-MCNC: 4.5 G/DL (ref 3.5–5.2)
ALP SERPL-CCNC: 62 U/L (ref 35–104)
ALT SERPL-CCNC: 12 U/L (ref 10–35)
ANION GAP SERPL CALCULATED.3IONS-SCNC: 12 MMOL/L (ref 9–16)
AST SERPL-CCNC: 17 U/L (ref 10–35)
BASOPHILS # BLD: <0.03 K/UL (ref 0–0.2)
BASOPHILS NFR BLD: 0 % (ref 0–2)
BILIRUB SERPL-MCNC: 0.6 MG/DL (ref 0–1.2)
BILIRUB UR QL STRIP: NEGATIVE
BUN SERPL-MCNC: 16 MG/DL (ref 6–20)
CALCIUM SERPL-MCNC: 9.8 MG/DL (ref 8.6–10.4)
CHLORIDE SERPL-SCNC: 102 MMOL/L (ref 98–107)
CLARITY UR: CLEAR
CO2 SERPL-SCNC: 25 MMOL/L (ref 20–31)
COLOR UR: YELLOW
COMMENT: ABNORMAL
CREAT SERPL-MCNC: 0.9 MG/DL (ref 0.7–1.2)
D DIMER PPP FEU-MCNC: <0.27 UG/ML FEU (ref 0–0.59)
EOSINOPHIL # BLD: 0.03 K/UL (ref 0–0.44)
EOSINOPHILS RELATIVE PERCENT: 0 % (ref 0–4)
ERYTHROCYTE [DISTWIDTH] IN BLOOD BY AUTOMATED COUNT: 12.2 % (ref 11.5–14.9)
GFR, ESTIMATED: 78 ML/MIN/1.73M2
GLUCOSE SERPL-MCNC: 102 MG/DL (ref 74–99)
GLUCOSE UR STRIP-MCNC: NEGATIVE MG/DL
HCT VFR BLD AUTO: 44.8 % (ref 36–46)
HGB BLD-MCNC: 15 G/DL (ref 12–16)
HGB UR QL STRIP.AUTO: NEGATIVE
IMM GRANULOCYTES # BLD AUTO: <0.03 K/UL (ref 0–0.3)
IMM GRANULOCYTES NFR BLD: 0 %
INR PPP: 1
KETONES UR STRIP-MCNC: ABNORMAL MG/DL
LEUKOCYTE ESTERASE UR QL STRIP: NEGATIVE
LIPASE SERPL-CCNC: 58 U/L (ref 13–60)
LYMPHOCYTES NFR BLD: 1.65 K/UL (ref 1.1–3.7)
LYMPHOCYTES RELATIVE PERCENT: 24 % (ref 24–44)
MAGNESIUM SERPL-MCNC: 2 MG/DL (ref 1.6–2.6)
MCH RBC QN AUTO: 28.7 PG (ref 26–34)
MCHC RBC AUTO-ENTMCNC: 33.5 G/DL (ref 31–37)
MCV RBC AUTO: 85.8 FL (ref 80–100)
MONOCYTES NFR BLD: 0.51 K/UL (ref 0.1–1.2)
MONOCYTES NFR BLD: 8 % (ref 3–12)
NEUTROPHILS NFR BLD: 68 % (ref 36–66)
NEUTS SEG NFR BLD: 4.61 K/UL (ref 1.5–8.1)
NITRITE UR QL STRIP: NEGATIVE
NRBC BLD-RTO: 0 PER 100 WBC
PH UR STRIP: 6 [PH] (ref 5–8)
PLATELET # BLD AUTO: 249 K/UL (ref 150–450)
PMV BLD AUTO: 10 FL (ref 8–13.5)
POTASSIUM SERPL-SCNC: 4.5 MMOL/L (ref 3.7–5.3)
PROT SERPL-MCNC: 7.3 G/DL (ref 6.6–8.7)
PROT UR STRIP-MCNC: NEGATIVE MG/DL
PROTHROMBIN TIME: 13.5 SEC (ref 11.8–14.6)
RBC # BLD AUTO: 5.22 M/UL (ref 3.95–5.11)
SODIUM SERPL-SCNC: 139 MMOL/L (ref 136–145)
SP GR UR STRIP: 1.02 (ref 1–1.03)
TROPONIN I SERPL HS-MCNC: <6 NG/L (ref 0–14)
TROPONIN I SERPL HS-MCNC: <6 NG/L (ref 0–14)
UROBILINOGEN UR STRIP-ACNC: NORMAL EU/DL (ref 0–1)
WBC OTHER # BLD: 6.8 K/UL (ref 3.5–11)

## 2025-07-09 PROCEDURE — 99285 EMERGENCY DEPT VISIT HI MDM: CPT

## 2025-07-09 PROCEDURE — 72128 CT CHEST SPINE W/O DYE: CPT

## 2025-07-09 PROCEDURE — 84484 ASSAY OF TROPONIN QUANT: CPT

## 2025-07-09 PROCEDURE — 85379 FIBRIN DEGRADATION QUANT: CPT

## 2025-07-09 PROCEDURE — 85025 COMPLETE CBC W/AUTO DIFF WBC: CPT

## 2025-07-09 PROCEDURE — 83735 ASSAY OF MAGNESIUM: CPT

## 2025-07-09 PROCEDURE — 93005 ELECTROCARDIOGRAM TRACING: CPT | Performed by: EMERGENCY MEDICINE

## 2025-07-09 PROCEDURE — 6360000002 HC RX W HCPCS: Performed by: EMERGENCY MEDICINE

## 2025-07-09 PROCEDURE — 96375 TX/PRO/DX INJ NEW DRUG ADDON: CPT

## 2025-07-09 PROCEDURE — 96374 THER/PROPH/DIAG INJ IV PUSH: CPT

## 2025-07-09 PROCEDURE — 85610 PROTHROMBIN TIME: CPT

## 2025-07-09 PROCEDURE — 6370000000 HC RX 637 (ALT 250 FOR IP): Performed by: EMERGENCY MEDICINE

## 2025-07-09 PROCEDURE — 96372 THER/PROPH/DIAG INJ SC/IM: CPT

## 2025-07-09 PROCEDURE — 71045 X-RAY EXAM CHEST 1 VIEW: CPT

## 2025-07-09 PROCEDURE — 36415 COLL VENOUS BLD VENIPUNCTURE: CPT

## 2025-07-09 PROCEDURE — 74176 CT ABD & PELVIS W/O CONTRAST: CPT

## 2025-07-09 PROCEDURE — 83690 ASSAY OF LIPASE: CPT

## 2025-07-09 PROCEDURE — 80053 COMPREHEN METABOLIC PANEL: CPT

## 2025-07-09 PROCEDURE — 81003 URINALYSIS AUTO W/O SCOPE: CPT

## 2025-07-09 RX ORDER — KETOROLAC TROMETHAMINE 30 MG/ML
30 INJECTION, SOLUTION INTRAMUSCULAR; INTRAVENOUS ONCE
Status: COMPLETED | OUTPATIENT
Start: 2025-07-09 | End: 2025-07-09

## 2025-07-09 RX ORDER — LIDOCAINE 4 G/G
1 PATCH TOPICAL ONCE
Status: DISCONTINUED | OUTPATIENT
Start: 2025-07-09 | End: 2025-07-09 | Stop reason: HOSPADM

## 2025-07-09 RX ORDER — ORPHENADRINE CITRATE 30 MG/ML
60 INJECTION INTRAMUSCULAR; INTRAVENOUS ONCE
Status: COMPLETED | OUTPATIENT
Start: 2025-07-09 | End: 2025-07-09

## 2025-07-09 RX ORDER — MORPHINE SULFATE 4 MG/ML
4 INJECTION, SOLUTION INTRAMUSCULAR; INTRAVENOUS
Refills: 0 | Status: COMPLETED | OUTPATIENT
Start: 2025-07-09 | End: 2025-07-09

## 2025-07-09 RX ORDER — LIDOCAINE 50 MG/G
1 PATCH TOPICAL DAILY
Qty: 10 PATCH | Refills: 0 | Status: SHIPPED | OUTPATIENT
Start: 2025-07-09 | End: 2025-07-19

## 2025-07-09 RX ORDER — ORPHENADRINE CITRATE 100 MG/1
100 TABLET ORAL 2 TIMES DAILY
Qty: 20 TABLET | Refills: 0 | Status: SHIPPED | OUTPATIENT
Start: 2025-07-09 | End: 2025-07-19

## 2025-07-09 RX ORDER — IBUPROFEN 600 MG/1
600 TABLET, FILM COATED ORAL 3 TIMES DAILY PRN
Qty: 20 TABLET | Refills: 0 | Status: SHIPPED | OUTPATIENT
Start: 2025-07-09

## 2025-07-09 RX ADMIN — MORPHINE SULFATE 4 MG: 4 INJECTION, SOLUTION INTRAMUSCULAR; INTRAVENOUS at 12:29

## 2025-07-09 RX ADMIN — ORPHENADRINE CITRATE 60 MG: 30 INJECTION, SOLUTION INTRAMUSCULAR; INTRAVENOUS at 12:33

## 2025-07-09 RX ADMIN — KETOROLAC TROMETHAMINE 30 MG: 30 INJECTION, SOLUTION INTRAMUSCULAR at 16:02

## 2025-07-09 ASSESSMENT — PAIN DESCRIPTION - ORIENTATION
ORIENTATION: LEFT

## 2025-07-09 ASSESSMENT — ENCOUNTER SYMPTOMS
SHORTNESS OF BREATH: 0
ABDOMINAL PAIN: 1
COLOR CHANGE: 0
EYE PAIN: 0
BACK PAIN: 1

## 2025-07-09 ASSESSMENT — PAIN DESCRIPTION - DESCRIPTORS
DESCRIPTORS: SHARP

## 2025-07-09 ASSESSMENT — PAIN SCALES - GENERAL
PAINLEVEL_OUTOF10: 3
PAINLEVEL_OUTOF10: 3
PAINLEVEL_OUTOF10: 7
PAINLEVEL_OUTOF10: 0
PAINLEVEL_OUTOF10: 3
PAINLEVEL_OUTOF10: 3

## 2025-07-09 ASSESSMENT — HEART SCORE: ECG: NORMAL

## 2025-07-09 ASSESSMENT — PAIN DESCRIPTION - LOCATION
LOCATION: BACK

## 2025-07-09 ASSESSMENT — PAIN - FUNCTIONAL ASSESSMENT
PAIN_FUNCTIONAL_ASSESSMENT: 0-10
PAIN_FUNCTIONAL_ASSESSMENT: 0-10

## 2025-07-09 NOTE — ED NOTES
The following labs were labeled with appropriate pt sticker and tubed to lab:     [x] Blue     [x] Lavender   [] on ice  [x] Green/yellow  [] Green/black [] on ice  [] Mills  [] on ice  [x] Yellow  [] Red  [] Pink  [] Type/ Screen  [] ABG  [] VBG

## 2025-07-09 NOTE — ED PROVIDER NOTES
reviewed by myself, and all abnormals are listed below.  Labs Reviewed   CBC WITH AUTO DIFFERENTIAL - Abnormal; Notable for the following components:       Result Value    RBC 5.22 (*)     Neutrophils % 68 (*)     All other components within normal limits   COMPREHENSIVE METABOLIC PANEL - Abnormal; Notable for the following components:    Glucose 102 (*)     All other components within normal limits   URINALYSIS - Abnormal; Notable for the following components:    Ketones, Urine TRACE (*)     All other components within normal limits   D-DIMER, QUANTITATIVE   LIPASE   MAGNESIUM   TROPONIN   TROPONIN   PROTIME-INR       Vitals Reviewed:    Vitals:    07/09/25 1330 07/09/25 1400 07/09/25 1602 07/09/25 1636   BP: (!) 117/48 118/64 122/73 112/65   Pulse: 73 81 80 76   Resp: 17 16 20 11   Temp:    98.4 °F (36.9 °C)   TempSrc:    Oral   SpO2: 96% 95% 98% 98%   Weight:       Height:           Initial Pain Score: Pain Level: 7 (07/09/25 1229)  Last Pain Score: Pain Level: 0 (07/09/25 1636)      MEDICATIONS GIVEN TO PATIENT THIS ENCOUNTER:  Orders Placed This Encounter   Medications    morphine injection 4 mg     Refill:  0    lidocaine 4 % external patch 1 patch    orphenadrine (NORFLEX) injection 60 mg    ketorolac (TORADOL) injection 30 mg    orphenadrine (NORFLEX) 100 MG extended release tablet     Sig: Take 1 tablet by mouth 2 times daily for 10 days     Dispense:  20 tablet     Refill:  0    lidocaine (LIDODERM) 5 %     Sig: Place 1 patch onto the skin daily for 10 days 12 hours on, 12 hours off.     Dispense:  10 patch     Refill:  0    ibuprofen (ADVIL;MOTRIN) 600 MG tablet     Sig: Take 1 tablet by mouth 3 times daily as needed for Pain     Dispense:  20 tablet     Refill:  0     DISCHARGE PRESCRIPTIONS:  Discharge Medication List as of 7/9/2025  4:28 PM        START taking these medications    Details   orphenadrine (NORFLEX) 100 MG extended release tablet Take 1 tablet by mouth 2 times daily for 10 days, Disp-20

## 2025-07-10 LAB
EKG ATRIAL RATE: 82 BPM
EKG P AXIS: 38 DEGREES
EKG P-R INTERVAL: 146 MS
EKG Q-T INTERVAL: 360 MS
EKG QRS DURATION: 74 MS
EKG QTC CALCULATION (BAZETT): 420 MS
EKG R AXIS: 0 DEGREES
EKG T AXIS: 9 DEGREES
EKG VENTRICULAR RATE: 82 BPM

## 2025-07-11 ENCOUNTER — HOSPITAL ENCOUNTER (OUTPATIENT)
Age: 50
Setting detail: THERAPIES SERIES
Discharge: HOME | End: 2025-07-11
Payer: COMMERCIAL

## 2025-07-11 PROCEDURE — 97110 THERAPEUTIC EXERCISES: CPT

## 2025-07-11 PROCEDURE — 97162 PT EVAL MOD COMPLEX 30 MIN: CPT

## 2025-07-11 NOTE — CONSULTS
[facility]  [address], [address]  [place] [facility] [place] [address]  [phone]  F: [phone]     Physical Therapy Lower Extremity Evaluation    Date:  2025  Patient: Charlee Hernandez  : 1975  MRN: [id number]  Physician: Toyin Cortez ([place])     Insurance: [facility] ; 90/90 VISITS; $15.00 COPAYMENT   Medical Diagnosis: (L) knee OA M17.12  Rehab Codes: (L) knee pain M25.562  Onset date: referral 25  Next Dr's appt.: PRN  Visit Count:    Cancel/No Show: 0/0    Subjective:   Patient reports to therapy s/p injury of knee in end of May.  Stated she was carrying objects up the stairs and attempted to kick a chair out of the way, foot got caught and knee went out laterally- possible rotational or varus force of the knee.  Patient noted continued symptoms and ended up seeing urgent care for the issue- XRAYs done were negative at that time.  Patient did have MRI which showed minor issues and these are listed below.  Ended up seeing orthopedic physician on 25 and had injection.  Had reaction and leg swelled up for about a week but did not contact orthopedic physician.  Since that time has had improved symptoms for about the last week to 10 days.  Still having some discomfort/pain in anterior knee/patellar tendon region and lateral joint line/LCL region with attempting kneeling directly on knees, with stairs intermittently, with walking intermittently.    CC: complaints of anterior knee pain, complaints of posterior lateral knee pain in LCL region  HPI: injury as listed above at end of MAY    PMHx: [] Unremarkable [] Diabetes [x] HTN  [] Pacemaker   [] MI/Heart Problems [] Cancer [] Arthritis [x] Other:hyperlipidemia              [x] Refer to full medical chart  In [facility]     Medical History    Diagnosis Date Comment Source   Hyperlipidemia      Hypertension         Other Medical History    Diagnosis Date Comment Source   Cazares's esophagus

## 2025-07-14 ENCOUNTER — HOSPITAL ENCOUNTER (OUTPATIENT)
Age: 50
Setting detail: THERAPIES SERIES
Discharge: HOME | End: 2025-07-14
Payer: COMMERCIAL

## 2025-07-14 PROCEDURE — 97110 THERAPEUTIC EXERCISES: CPT

## 2025-07-17 RX ORDER — LISINOPRIL AND HYDROCHLOROTHIAZIDE 12.5; 2 MG/1; MG/1
1 TABLET ORAL DAILY
Qty: 90 TABLET | Refills: 1 | Status: SHIPPED | OUTPATIENT
Start: 2025-07-17

## 2025-07-21 RX ORDER — LISINOPRIL AND HYDROCHLOROTHIAZIDE 12.5; 2 MG/1; MG/1
1 TABLET ORAL DAILY
Qty: 90 TABLET | Refills: 1 | Status: SHIPPED | OUTPATIENT
Start: 2025-07-21

## 2025-07-22 ENCOUNTER — HOSPITAL ENCOUNTER (OUTPATIENT)
Dept: WOMENS IMAGING | Age: 50
Discharge: HOME | End: 2025-07-24
Payer: COMMERCIAL

## 2025-07-22 VITALS — WEIGHT: 153 LBS | BODY MASS INDEX: 26.12 KG/M2 | HEIGHT: 64 IN

## 2025-07-22 DIAGNOSIS — R92.8 FOLLOW-UP EXAMINATION OF ABNORMAL MAMMOGRAM: ICD-10-CM

## 2025-07-22 DIAGNOSIS — R92.8 ABNORMAL MAMMOGRAM OF RIGHT BREAST: ICD-10-CM

## 2025-07-22 PROCEDURE — G0279 TOMOSYNTHESIS, MAMMO: HCPCS

## 2025-07-23 ENCOUNTER — HOSPITAL ENCOUNTER (OUTPATIENT)
Age: 50
Setting detail: THERAPIES SERIES
Discharge: HOME | End: 2025-07-23
Payer: COMMERCIAL

## 2025-07-23 ENCOUNTER — APPOINTMENT (OUTPATIENT)
Age: 50
End: 2025-07-23
Payer: COMMERCIAL

## 2025-07-23 NOTE — FLOWSHEET NOTE
[facility]  [address], [address]  [place] [address]  [phone]  F: [phone]     Physical Therapy Cancel/No Show note    Date: 2025  Patient: Charlee Hernandez  : 1975  MRN: [id number]    Cancels/No Shows to date:     For today's appointment patient:    [x]  Cancelled    [] Rescheduled appointment    [] No-show     Reason given by patient:    []  Patient ill    []  Conflicting appointment    [] No transportation      [] Conflict with work    [] No reason given    [] Weather related    [] COVID-19    [x] Other:      Comments:  Pt left a message, no reason provided.       [x] Next appointment was confirmed    Electronically signed by: Clay Jaquez PTA

## 2025-07-30 ENCOUNTER — HOSPITAL ENCOUNTER (OUTPATIENT)
Age: 50
Setting detail: THERAPIES SERIES
Discharge: HOME | End: 2025-07-30
Payer: COMMERCIAL

## 2025-07-30 PROCEDURE — 97110 THERAPEUTIC EXERCISES: CPT

## 2025-07-30 NOTE — FLOWSHEET NOTE
[facility]  Therapy  [address], [address]  [place] [address]  [phone]  F: [phone]     Physical Therapy Daily Treatment Note    Date:  2025  Patient Name:  Charlee Hernandez    :  1975  MRN: [id number]  Physician: Toyin Cortez ([place])                             Insurance: [facility] ; 90/90 VISITS; $15.00 COPAYMENT   Medical Diagnosis: (L) knee OA M17.12                 Rehab Codes: (L) knee pain M25.562  Onset date: referral 25             Next Dr's appt.: PRN    Visit Count: 3/12                                Cancel/No Show: 0/0    Subjective:    Pain:  [x] Yes  [] No Location: left knee/patella   Pain Rating: (0-10 scale) 0-3/10  Pain altered Tx:  [x] No  [] Yes  Action:    Comments:  Pt reports that she has been doing well since her last session.  States that she has been doing some light workouts without knee discomfort.  Currently, pt denies left knee discomfort.     Objective:  left ankle pronation in Wb'ing, laxity of LCL and MCL, pt hypermobile. Some weakness and difficulty maintaining neutral hip rotation.  Improved performance with stability after verbal and tactile cuing.     Today’s Treatment:    Modalities:   Precautions:    Exercise Reps/ Time Weight/ Level Comments   SciFit  6 min   L2 neutral hip rotation monitor varis/valgus   Standing calf stretch 20\" x 4        Hamstring stretch at stairs 20\"x 4      Lunge stretch at stairs 20\" x 4    neutral hip rotation monitor varis/valgus   Step-ups  x 15  6\" neutral hip rotation monitor varis/valgus   Lateral step ups  x 15  4\"     3-way hip  X 10 ea  SLS left LE maintaining neutral ankle positioning   LAQ 2 X 10  3#    Resisted ham curls X 20  Green 5#    Hip ER/IR X 20  Green 5#    Standing arch raises  2 x 10      Prone quad stretch  NP  With strap    Resisted ankle IV/EV X 15 ea Lime loop    Other:    Response to treatment:  Pt tolerated session well, denies knee pain

## (undated) DEVICE — BITEBLOCK 54FR W/ DENT RIM BLOX

## (undated) DEVICE — FORCEPS BX L240CM JAW DIA2.8MM L CAP W/ NDL MIC MESH TOOTH

## (undated) DEVICE — FORCEPS BX L240CM WRK CHN 2.8MM STD CAP W/ NDL MIC MESH

## (undated) DEVICE — DEFENDO AIR WATER SUCTION AND BIOPSY VALVE KIT FOR  OLYMPUS: Brand: DEFENDO AIR/WATER/SUCTION AND BIOPSY VALVE

## (undated) DEVICE — PERRYSBURG ENDO PACK: Brand: MEDLINE INDUSTRIES, INC.

## (undated) DEVICE — ENDO KIT W/SYRINGE: Brand: MEDLINE INDUSTRIES, INC.

## (undated) DEVICE — Device: Brand: DEFENDO VALVE AND CONNECTOR KIT